# Patient Record
Sex: FEMALE | Race: WHITE | NOT HISPANIC OR LATINO | Employment: UNEMPLOYED | URBAN - METROPOLITAN AREA
[De-identification: names, ages, dates, MRNs, and addresses within clinical notes are randomized per-mention and may not be internally consistent; named-entity substitution may affect disease eponyms.]

---

## 2017-02-27 RX ORDER — CLOBETASOL PROPIONATE 0.5 MG/G
CREAM TOPICAL
Qty: 60 G | Refills: 0 | OUTPATIENT
Start: 2017-02-27

## 2017-03-18 DIAGNOSIS — M35.9 AUTOIMMUNE DISEASE (H): Primary | ICD-10-CM

## 2017-03-18 DIAGNOSIS — L63.9 ALOPECIA AREATA: ICD-10-CM

## 2017-03-18 RX ORDER — CLOBETASOL PROPIONATE 0.5 MG/G
CREAM TOPICAL
Qty: 60 G | Refills: 1 | Status: SHIPPED | OUTPATIENT
Start: 2017-03-18 | End: 2019-02-19

## 2017-03-18 RX ORDER — CLOBETASOL PROPIONATE 0.5 MG/G
OINTMENT TOPICAL
Qty: 60 G | Refills: 1 | Status: SHIPPED | OUTPATIENT
Start: 2017-03-18 | End: 2022-01-31

## 2017-04-25 ENCOUNTER — OFFICE VISIT (OUTPATIENT)
Dept: DERMATOLOGY | Facility: CLINIC | Age: 62
End: 2017-04-25

## 2017-04-25 VITALS — DIASTOLIC BLOOD PRESSURE: 73 MMHG | SYSTOLIC BLOOD PRESSURE: 116 MMHG | HEART RATE: 78 BPM

## 2017-04-25 DIAGNOSIS — L63.9 ALOPECIA AREATA: ICD-10-CM

## 2017-04-25 DIAGNOSIS — L21.9 DERMATITIS, SEBORRHEIC: ICD-10-CM

## 2017-04-25 DIAGNOSIS — M35.9 AUTOIMMUNE DISEASE (H): ICD-10-CM

## 2017-04-25 DIAGNOSIS — L63.9 ALOPECIA AREATA: Primary | ICD-10-CM

## 2017-04-25 LAB
BASOPHILS # BLD AUTO: 0 10E9/L (ref 0–0.2)
BASOPHILS NFR BLD AUTO: 0.5 %
DEPRECATED CALCIDIOL+CALCIFEROL SERPL-MC: 64 UG/L (ref 20–75)
DIFFERENTIAL METHOD BLD: NORMAL
EOSINOPHIL # BLD AUTO: 0.3 10E9/L (ref 0–0.7)
EOSINOPHIL NFR BLD AUTO: 4.7 %
ERYTHROCYTE [DISTWIDTH] IN BLOOD BY AUTOMATED COUNT: 12.2 % (ref 10–15)
FERRITIN SERPL-MCNC: 102 NG/ML (ref 8–252)
HCT VFR BLD AUTO: 42 % (ref 35–47)
HGB BLD-MCNC: 14.4 G/DL (ref 11.7–15.7)
IMM GRANULOCYTES # BLD: 0 10E9/L (ref 0–0.4)
IMM GRANULOCYTES NFR BLD: 0.3 %
IRON SATN MFR SERPL: 29 % (ref 15–46)
IRON SERPL-MCNC: 101 UG/DL (ref 35–180)
LYMPHOCYTES # BLD AUTO: 1.8 10E9/L (ref 0.8–5.3)
LYMPHOCYTES NFR BLD AUTO: 29.5 %
MCH RBC QN AUTO: 31.8 PG (ref 26.5–33)
MCHC RBC AUTO-ENTMCNC: 34.3 G/DL (ref 31.5–36.5)
MCV RBC AUTO: 93 FL (ref 78–100)
MONOCYTES # BLD AUTO: 0.6 10E9/L (ref 0–1.3)
MONOCYTES NFR BLD AUTO: 9.3 %
NEUTROPHILS # BLD AUTO: 3.4 10E9/L (ref 1.6–8.3)
NEUTROPHILS NFR BLD AUTO: 55.7 %
NRBC # BLD AUTO: 0 10*3/UL
NRBC BLD AUTO-RTO: 0 /100
PLATELET # BLD AUTO: 240 10E9/L (ref 150–450)
RBC # BLD AUTO: 4.53 10E12/L (ref 3.8–5.2)
TIBC SERPL-MCNC: 346 UG/DL (ref 240–430)
TSH SERPL DL<=0.005 MIU/L-ACNC: 1.67 MU/L (ref 0.4–4)
WBC # BLD AUTO: 6.1 10E9/L (ref 4–11)

## 2017-04-25 RX ORDER — KETOCONAZOLE 20 MG/ML
SHAMPOO TOPICAL
Qty: 120 ML | Refills: 5 | Status: SHIPPED | OUTPATIENT
Start: 2017-04-25

## 2017-04-25 RX ORDER — ESCITALOPRAM OXALATE 20 MG/1
TABLET ORAL
COMMUNITY
Start: 2017-04-10 | End: 2020-09-15

## 2017-04-25 ASSESSMENT — PAIN SCALES - GENERAL: PAINLEVEL: NO PAIN (0)

## 2017-04-25 NOTE — NURSING NOTE
Dermatology Rooming Note    Maria C Goodman's goals for this visit include:   Chief Complaint   Patient presents with     Hair Loss     Maria C is here today for a hair loss follow up.      Susan Newsome MA

## 2017-04-25 NOTE — LETTER
"4/25/2017       RE: Maria C Goodman  [address] [place] [address]     Dear Colleague,    Thank you for referring your patient, Maria C Goodman, to the [facility] DERMATOLOGY at [facility]. Please see a copy of my visit note below.    [facility] Dermatology Note    Encounter Date: April 25, 2017    Dermatology Problem List   - Alopecia areata   - Hand dermatitis   - Facial hypertrichosis, resolved  - urticaria, possibly cold induced      Chief Complaint  Chief Complaint   Patient presents with     Hair Loss     Maria C is here today for a hair loss follow up.        History of Present Illness  Maria C Goodman is a pleasant 61 year old female who presents as a follow-up for alopecia areata.     Cold/chronic urticaria has improved with omalizumab injections.     As for her alopecia, she did notice new patches six weeks ago, correlated with the end of a long-term respiratory infection. She self-treats with Lidex solution applied topically \"8 times per week.\" Lidex is not apparently helpful; at least one patch is expanding in surface area involvement. Symptoms associated with hair loss include pruritus but never a burning sensation.    Ms. Goodman has no leg hair or underarm hair, which is stable over time. This has been stable \"for years.\"     Maria C takes an iron supplement daily. Her regular shampoo is Head and Shoulders.    The patient has been free of stage 3A lobular breast carcinoma for several months now.    Medications  Current Outpatient Prescriptions   Medication Sig Dispense Refill     omalizumab (XOLAIR) 150 MG injection Inject 300 mg Subcutaneous       escitalopram (LEXAPRO) 20 MG tablet Take one tablet by mouth once daily with 10 mg tablet for total dose 30 mg.       clobetasol (TEMOVATE) 0.05 % ointment Apply a fine layer to the scalp at night after application of Rogaine, shampoo in am. A tube should last 2 months. 60 g 1     clobetasol (TEMOVATE) " 0.05 % cream Apply a fine layer to scalp in the morning after shampooing, a tube should last 2 months 60 g 1     fluocinonide (LIDEX) 0.05 % external solution Use to treat any areas on the scalp that are itchy as needed. Use twice daily to any areas of complete hair loss (including 2 inches around this area) 60 mL 3     levothyroxine (SYNTHROID, LEVOTHROID) 112 MCG tablet Take by mouth daily       tacrolimus (PROTOPIC) 0.1 % ointment Apply topically 2 times daily 60 g 1     ferrous gluconate (FERGON) 324 (38 FE) MG tablet One tablet daily 60 tablet 1     EXEMESTANE PO Take by mouth daily       montelukast (SINGULAIR) 10 MG tablet Take 10 mg by mouth At Bedtime       cetirizine (ZYRTEC) 10 MG tablet Take 10 mg by mouth daily       RANITIDINE HCL PO Take 150 mg by mouth 2 times daily       Cholecalciferol (VITAMIN D) 1000 UNITS capsule Take 5,000 Units by mouth daily       Calcium Carbonate-Vitamin D (CALCIUM + D PO) Take by mouth daily       ALBUTEROL IN Inhale into the lungs daily as needed         Allergies  Allergies   Allergen Reactions     Acetaminophen Hives     Penicillins Hives     Shellfish Allergy GI Disturbance     Silver Nitrate Hives     tegaderm adhesive dressing      Sulfa Drugs Hives     Vicodin [Hydrocodone-Acetaminophen] Hives       Review of Systems   - Const: the patient is generally feeling well.   - Skin: as per HPI  -LYMPH: lymphedema to the left arm related to ipsilateral lymph node biopsy. Treating with compression sleeve  -PULM: Reports respiratory symptoms over the summer; treated with antibiotics. Antibiotics ended mid-March     Past Medical History:   Past Medical History:   Diagnosis Date     Asthma      Hypothyroidism      Lobular breast cancer (H)     s/p chemotherapy and radiation, in remission     Multiple allergies      History reviewed. No pertinent surgical history.    Family History  - niece with alopecia areata  Family History   Problem Relation Age of Onset     CANCER No family  hx of      skin cancer     Skin Cancer No family hx of      no skin cancer     Rheumatoid Arthritis Mother        Physical exam  /73  Pulse 78    General: This is a well developed, well-nourished female in no acute distress, in a pleasant mood. Kim skin type I-II.  Lungs: breathing comfortably on room air  Skin: Focused examination of the scalp, eyelashes, fingernails was performed. Significant findings include:  -Patchy focal erythema under examination of the scalp under bright light  -Hair pull tests are negative.  -Bilateral arms: sparse hairs.   -Vertical ridging of 10/10 fingernails  -New activity of hair loss throughout frontal scalp.  -Left side:  patch of alopecic hair los.  -Left and lower occiput: new patches of alopecia.   - Hair pull tests are negative      Impression/Plan  1-2. Alopecia areata confounded by seborrheic dermatitis. Patient advised to use Lidex solution on any new areas of alopecia areata that may develop and treat 2 inches in all directions in the surrounding scalp. At this time she may discontinue Rogaine. Patient instruction today focused on maintaining scalp health.     Draw for hairloss labs: CBC with diff, ferritin, TSH, vitamin D    Start ketoconazole 2% shampoo. Apply to scalp. Lather, then rinse. Use every other day, alternating with Head and Shoulders shampoo.      2. Urticaria, chronic/cold-induced, well-controlled with omalizumab:    Continue Xolair injections    Follow-up in eight weeks    Staff involved:  Scribe/Staff  I, Ramses Cobian, am serving as a scribe to document services personally performed by Dr. Nita Brown MD, based on data collection and the provider's statements to me.    Ms. Goodman was seen with Ramses as noted above. Assessents and plan were developed by me.    Nita Brown MD  Professor and  Chair  Department of Dermatology  [facility]              Pictures were placed in Pt's chart today for future reference.      Again,  thank you for allowing me to participate in the care of your patient.      Sincerely,    Nita Brown MD

## 2017-04-25 NOTE — MR AVS SNAPSHOT
After Visit Summary   4/25/2017    Maria C Goodman    MRN: [id number]           Patient Information     Date Of Birth          1955        Visit Information        Provider Department      4/25/2017 2:00 PM Nita Brown MD [facility] Dermatology        Today's Diagnoses     Alopecia areata    -  1    Autoimmune disease (H)        Dermatitis, seborrheic          Care Instructions    Dr. Brown's Clinic Follow-up:    If we are unable to schedule your appointment today, then you will receive a personal call from our clinic staff to schedule with Dr. Brown prior to your expected return.    Please, contact us with any questions via telephone.  We are not using [employer] to schedule appointments for this clinic at this the present time    How do I call with questions?       [facility]: [phone]       For urgent needs outside of business hours call the [facility] at [phone]        and ask for the dermatology resident on call            Follow-ups after your visit        Future tests that were ordered for you today     Open Future Orders        Priority Expected Expires Ordered    CBC with platelets differential Routine  4/25/2018 4/25/2017    Ferritin Routine  4/25/2018 4/25/2017    Iron and iron binding capacity Routine  4/25/2018 4/25/2017    TSH with free T4 reflex Routine  4/25/2018 4/25/2017    Vitamin D Deficiency Routine  4/25/2018 4/25/2017            Who to contact     Please call your clinic at [phone] to:    Ask questions about your health    Make or cancel appointments    Discuss your medicines    Learn about your test results    Speak to your doctor   If you have compliments or concerns about an experience at your clinic, or if you wish to file a complaint, please contact [facility] Patient Relations at [phone] or email us at [email or web address]         Additional Information About  Your Visit        "[employer]hart Information     [employer] gives you secure access to your electronic health record. If you see a primary care provider, you can also send messages to your care team and make appointments. If you have questions, please call your primary care clinic.  If you do not have a primary care provider, please call [phone] and they will assist you.      [employer] is an electronic gateway that provides easy, online access to your medical records. With [employer], you can request a clinic appointment, read your test results, renew a prescription or communicate with your care team.     To access your existing account, please contact your [facility] or call [phone] for assistance.        Care EveryWhere ID     This is your Care EveryWhere ID. This could be used by other organizations to access your [place] medical records  [id number]        Your Vitals Were     Pulse                   78            Blood Pressure from Last 3 Encounters:   04/25/17 116/73   04/04/16 118/75   12/14/15 112/59    Weight from Last 3 Encounters:   04/04/16 73.9 kg (163 lb)   12/14/15 73.9 kg (163 lb)   10/01/15 72.6 kg (160 lb)                 Today's Medication Changes          These changes are accurate as of: 4/25/17  2:57 PM.  If you have any questions, ask your nurse or doctor.               Start taking these medicines.        Dose/Directions    ketoconazole 2 % shampoo   Commonly known as:  NIZORAL   Used for:  Dermatitis, seborrheic   Started by:  Nita Brown MD        Apply to scalp, lather, then rinse, do every other day alternating with Head & Shoulders   Quantity:  120 mL   Refills:  5            Where to get your medicines      These medications were sent to [id number] - [place], [place] - [address] AT [facility] [address]  [address], [place] [address]     Phone:  [phone]     ketoconazole 2 % shampoo                Primary Care  Provider Office Phone # Fax #    Damaris Mckeon [phone]       [facility] [address] [place] [address]        Thank you!     Thank you for choosing [facility] DERMATOLOGY  for your care. Our goal is always to provide you with excellent care. Hearing back from our patients is one way we can continue to improve our services. Please take a few minutes to complete the written survey that you may receive in the mail after your visit with us. Thank you!             Your Updated Medication List - Protect others around you: Learn how to safely use, store and throw away your medicines at www.disposemymeds.org.          This list is accurate as of: 4/25/17  2:57 PM.  Always use your most recent med list.                   Brand Name Dispense Instructions for use    ALBUTEROL IN      Inhale into the lungs daily as needed       CALCIUM + D PO      Take by mouth daily       cetirizine 10 MG tablet    zyrTEC     Take 10 mg by mouth daily       * clobetasol 0.05 % ointment    TEMOVATE    60 g    Apply a fine layer to the scalp at night after application of Rogaine, shampoo in am. A tube should last 2 months.       * clobetasol 0.05 % cream    TEMOVATE    60 g    Apply a fine layer to scalp in the morning after shampooing, a tube should last 2 months       escitalopram 20 MG tablet    LEXAPRO     Take one tablet by mouth once daily with 10 mg tablet for total dose 30 mg.       EXEMESTANE PO      Take by mouth daily       ferrous gluconate 324 (38 FE) MG tablet    FERGON    60 tablet    One tablet daily       fluocinonide 0.05 % solution    LIDEX    60 mL    Use to treat any areas on the scalp that are itchy as needed. Use twice daily to any areas of complete hair loss (including 2 inches around this area)       ketoconazole 2 % shampoo    NIZORAL    120 mL    Apply to scalp, lather, then rinse, do every other day alternating with Head & Shoulders       levothyroxine 112 MCG tablet     SYNTHROID/LEVOTHROID     Take by mouth daily       omalizumab 150 MG injection    XOLAIR     Inject 300 mg Subcutaneous       RANITIDINE HCL PO      Take 150 mg by mouth 2 times daily       SINGULAIR 10 MG tablet   Generic drug:  montelukast      Take 10 mg by mouth At Bedtime       tacrolimus 0.1 % ointment    PROTOPIC    60 g    Apply topically 2 times daily       vitamin D 1000 UNITS capsule      Take 5,000 Units by mouth daily       * Notice:  This list has 2 medication(s) that are the same as other medications prescribed for you. Read the directions carefully, and ask your doctor or other care provider to review them with you.

## 2017-04-25 NOTE — PATIENT INSTRUCTIONS
Dr. Brown's Clinic Follow-up:    If we are unable to schedule your appointment today, then you will receive a personal call from our clinic staff to schedule with Dr. Brown prior to your expected return.    Please, contact us with any questions via telephone.  We are not using [employer] to schedule appointments for this clinic at this the present time    How do I call with questions?       [facility]: [phone]       For urgent needs outside of business hours call the [facility] at [phone]        and ask for the dermatology resident on call

## 2017-04-25 NOTE — PROGRESS NOTES
"[facility] Dermatology Note    Encounter Date: April 25, 2017    Dermatology Problem List   - Alopecia areata   - Hand dermatitis   - Facial hypertrichosis, resolved  - urticaria, possibly cold induced      Chief Complaint  Chief Complaint   Patient presents with     Hair Loss     Maria C is here today for a hair loss follow up.        History of Present Illness  Maria C Goodman is a pleasant 61 year old female who presents as a follow-up for alopecia areata.     Cold/chronic urticaria has improved with omalizumab injections.     As for her alopecia, she did notice new patches six weeks ago, correlated with the end of a long-term respiratory infection. She self-treats with Lidex solution applied topically \"8 times per week.\" Lidex is not apparently helpful; at least one patch is expanding in surface area involvement. Symptoms associated with hair loss include pruritus but never a burning sensation.    Ms. Goodman has no leg hair or underarm hair, which is stable over time. This has been stable \"for years.\"     Maria C takes an iron supplement daily. Her regular shampoo is Head and Shoulders.    The patient has been free of stage 3A lobular breast carcinoma for several months now.    Medications  Current Outpatient Prescriptions   Medication Sig Dispense Refill     omalizumab (XOLAIR) 150 MG injection Inject 300 mg Subcutaneous       escitalopram (LEXAPRO) 20 MG tablet Take one tablet by mouth once daily with 10 mg tablet for total dose 30 mg.       clobetasol (TEMOVATE) 0.05 % ointment Apply a fine layer to the scalp at night after application of Rogaine, shampoo in am. A tube should last 2 months. 60 g 1     clobetasol (TEMOVATE) 0.05 % cream Apply a fine layer to scalp in the morning after shampooing, a tube should last 2 months 60 g 1     fluocinonide (LIDEX) 0.05 % external solution Use to treat any areas on the scalp that are itchy as needed. Use twice daily to any areas of complete hair loss " (including 2 inches around this area) 60 mL 3     levothyroxine (SYNTHROID, LEVOTHROID) 112 MCG tablet Take by mouth daily       tacrolimus (PROTOPIC) 0.1 % ointment Apply topically 2 times daily 60 g 1     ferrous gluconate (FERGON) 324 (38 FE) MG tablet One tablet daily 60 tablet 1     EXEMESTANE PO Take by mouth daily       montelukast (SINGULAIR) 10 MG tablet Take 10 mg by mouth At Bedtime       cetirizine (ZYRTEC) 10 MG tablet Take 10 mg by mouth daily       RANITIDINE HCL PO Take 150 mg by mouth 2 times daily       Cholecalciferol (VITAMIN D) 1000 UNITS capsule Take 5,000 Units by mouth daily       Calcium Carbonate-Vitamin D (CALCIUM + D PO) Take by mouth daily       ALBUTEROL IN Inhale into the lungs daily as needed         Allergies  Allergies   Allergen Reactions     Acetaminophen Hives     Penicillins Hives     Shellfish Allergy GI Disturbance     Silver Nitrate Hives     tegaderm adhesive dressing      Sulfa Drugs Hives     Vicodin [Hydrocodone-Acetaminophen] Hives       Review of Systems   - Const: the patient is generally feeling well.   - Skin: as per HPI  -LYMPH: lymphedema to the left arm related to ipsilateral lymph node biopsy. Treating with compression sleeve  -PULM: Reports respiratory symptoms over the summer; treated with antibiotics. Antibiotics ended mid-March     Past Medical History:   Past Medical History:   Diagnosis Date     Asthma      Hypothyroidism      Lobular breast cancer (H)     s/p chemotherapy and radiation, in remission     Multiple allergies      History reviewed. No pertinent surgical history.    Family History  - niece with alopecia areata  Family History   Problem Relation Age of Onset     CANCER No family hx of      skin cancer     Skin Cancer No family hx of      no skin cancer     Rheumatoid Arthritis Mother        Physical exam  /73  Pulse 78    General: This is a well developed, well-nourished female in no acute distress, in a pleasant mood. Kim skin  type I-II.  Lungs: breathing comfortably on room air  Skin: Focused examination of the scalp, eyelashes, fingernails was performed. Significant findings include:  -Patchy focal erythema under examination of the scalp under bright light  -Hair pull tests are negative.  -Bilateral arms: sparse hairs.   -Vertical ridging of 10/10 fingernails  -New activity of hair loss throughout frontal scalp.  -Left side:  patch of alopecic hair los.  -Left and lower occiput: new patches of alopecia.   - Hair pull tests are negative      Impression/Plan  1-2. Alopecia areata confounded by seborrheic dermatitis. Patient advised to use Lidex solution on any new areas of alopecia areata that may develop and treat 2 inches in all directions in the surrounding scalp. At this time she may discontinue Rogaine. Patient instruction today focused on maintaining scalp health.     Draw for hairloss labs: CBC with diff, ferritin, TSH, vitamin D    Start ketoconazole 2% shampoo. Apply to scalp. Lather, then rinse. Use every other day, alternating with Head and Shoulders shampoo.      2. Urticaria, chronic/cold-induced, well-controlled with omalizumab:    Continue Xolair injections    Follow-up in eight weeks    Staff involved:  Scribe/Staff  I, Ramses Cobian, am serving as a scribe to document services personally performed by Dr. Nita Brown MD, based on data collection and the provider's statements to me.    Ms. Goodman was seen with Ramses as noted above. Assessents and plan were developed by me.    Nita Brown MD  Professor and  Chair  Department of Dermatology  [facility]

## 2017-05-18 DIAGNOSIS — L63.9 AA (ALOPECIA AREATA): ICD-10-CM

## 2017-05-18 RX ORDER — FLUOCINONIDE TOPICAL SOLUTION USP, 0.05% 0.5 MG/ML
SOLUTION TOPICAL
Qty: 60 ML | Refills: 3 | Status: SHIPPED | OUTPATIENT
Start: 2017-05-18 | End: 2018-10-17

## 2017-05-18 NOTE — TELEPHONE ENCOUNTER
Last seen:4/25/17  Next appt:None    1-2. Alopecia areata confounded by seborrheic dermatitis. Patient advised to use Lidex solution on any new areas of alopecia areata that may develop and treat 2 inches in all directions in the surrounding hair. At this time she may discontinue Rogaine. Patient instruction today focused on maintaining scalp health.     Ms. Goodman was advised that her new alopecic activity is almost certainly not related to her new Xolair injections; other patients receiving these injections tolerate them without exacerbation of their alopecia areata.     Draw for hairloss labs: CBC with diff, ferritin, TSH, vitamin D    Start ketoconazole 2% shampoo. Apply to scalp. Lather, then rinse. Use every other day, alternating with Head and Shoulders shampoo.    Continue      2. Urticaria, chronic/cold-induced, well-controlled with omalizumab:    Continue Xolair injections     Follow-up in eight weeks

## 2017-09-01 ENCOUNTER — OFFICE VISIT (OUTPATIENT)
Dept: DERMATOLOGY | Facility: CLINIC | Age: 62
End: 2017-09-01

## 2017-09-01 VITALS — DIASTOLIC BLOOD PRESSURE: 67 MMHG | SYSTOLIC BLOOD PRESSURE: 116 MMHG | HEART RATE: 83 BPM

## 2017-09-01 DIAGNOSIS — L30.9 DERMATITIS: ICD-10-CM

## 2017-09-01 DIAGNOSIS — L50.9 URTICARIA: ICD-10-CM

## 2017-09-01 DIAGNOSIS — L63.9 ALOPECIA AREATA: Primary | ICD-10-CM

## 2017-09-01 DIAGNOSIS — M35.9 AUTOIMMUNE DISEASE (H): ICD-10-CM

## 2017-09-01 RX ORDER — CLOBETASOL PROPIONATE 0.05 G/100ML
SHAMPOO TOPICAL
Qty: 1 BOTTLE | Refills: 2 | Status: SHIPPED | OUTPATIENT
Start: 2017-09-01 | End: 2018-05-22

## 2017-09-01 NOTE — NURSING NOTE
Chief Complaint   Patient presents with     Hair Loss     Maria C is here today for a recheck on her hair loss. She states that things are not going well.      Юлия Reddy, CMA

## 2017-09-01 NOTE — PATIENT INSTRUCTIONS
Stop iron supplement and resume Vitamin D 1000 IU daily    Hold on any topical steroids to scalp for the next 2 weeks and then start Clobex shampoo every other day if insurance covers and if not, then use fluocinonide solution to entire scalp every other day, aiming to use 1 bottle every 6 weeks.    RTC November as scheduled.    Nita Brown MD    .

## 2017-09-01 NOTE — LETTER
"9/1/2017       RE: Maria C Goodman  [address] [place] [address]     Dear Colleague,    Thank you for referring your patient, Maria C Goodman, to the [facility] DERMATOLOGY at [facility]. Please see a copy of my visit note below.    [facility] Dermatology Note      Dermatology Problem List:  1.Alopecia areata  2. Hand dermatitis  3. Urticaria, cold aggravated/induced    CC:   Chief Complaint   Patient presents with     Hair Loss     Maria C is here today for a recheck on her hair loss. She states that things are not going well.          Encounter Date: Sep 1, 2017    History of Present Illness:  Ms. Maria C Goodman is a 62 year old female who presents in follow up primarily for her alopecia areata. She reports that her scalp started to \"act up\"in early May with mild to moderate itching, often at night. She has been relying on fluocinonide solution applied 3 times per week this summer  to keep the symptom under control. Maria C reports shampooing every other day alternating H&S with 2% ketoconazole. She also uses clobetasol ointment 2 to 3 times per week.  She continues to take a daily iron supplement.    For her urticaria, she notes her allergist prescribed Xolair about a year ago, shots are being given monthly and this has really helped keep her lesions/symptoms under control.     Ms. Goodman continues to report good health and being  free of cancer - she was previously diagnosed with stage 3A lobular breast carcinoma, now treated.      Past Medical History:   Patient Active Problem List   Diagnosis     Alopecia areata     Dermatitis     Hypertrichosis     Urticaria     Past Medical History:   Diagnosis Date     Asthma      Hypothyroidism      Lobular breast cancer (H)     s/p chemotherapy and radiation, in remission     Multiple allergies          Medications:  Current Outpatient Prescriptions   Medication Sig Dispense Refill     fluocinonide (LIDEX) 0.05 % " solution Use to treat any areas on the scalp that are itchy as needed. Use twice daily to any areas of complete hair loss. 60 mL 3     omalizumab (XOLAIR) 150 MG injection Inject 300 mg Subcutaneous       escitalopram (LEXAPRO) 20 MG tablet Take one tablet by mouth once daily with 10 mg tablet for total dose 30 mg.       ketoconazole (NIZORAL) 2 % shampoo Apply to scalp, lather, then rinse, do every other day alternating with Head & Shoulders 120 mL 5     clobetasol (TEMOVATE) 0.05 % ointment Apply a fine layer to the scalp at night after application of Rogaine, shampoo in am. A tube should last 2 months. 60 g 1     clobetasol (TEMOVATE) 0.05 % cream Apply a fine layer to scalp in the morning after shampooing, a tube should last 2 months 60 g 1     levothyroxine (SYNTHROID, LEVOTHROID) 112 MCG tablet Take by mouth daily       tacrolimus (PROTOPIC) 0.1 % ointment Apply topically 2 times daily 60 g 1     ferrous gluconate (FERGON) 324 (38 FE) MG tablet One tablet daily 60 tablet 1     EXEMESTANE PO Take by mouth daily       montelukast (SINGULAIR) 10 MG tablet Take 10 mg by mouth At Bedtime       cetirizine (ZYRTEC) 10 MG tablet Take 10 mg by mouth daily       RANITIDINE HCL PO Take 150 mg by mouth 2 times daily       Cholecalciferol (VITAMIN D) 1000 UNITS capsule Take 5,000 Units by mouth daily       Calcium Carbonate-Vitamin D (CALCIUM + D PO) Take by mouth daily       ALBUTEROL IN Inhale into the lungs daily as needed       Allergies   Allergen Reactions     Acetaminophen Hives     Penicillins Hives     Shellfish Allergy GI Disturbance     Silver Nitrate Hives     tegaderm adhesive dressing      Sulfa Drugs Hives     Vicodin [Hydrocodone-Acetaminophen] Hives     Family History  - niece with alopecia areata      Review of Systems:  -The patient denies any new rash, pruritus, or lesions that are symptomatic, changing or bleeding, except as per HPI.  -Constitutional: The patient denies fatigue, fevers, chills,  unintended weight loss, and night sweats.  -HEENT: Patient denies nonhealing oral sores.  -Skin: As above in HPI. No additional skin concerns.    Physical exam:  Vitals: /67 (BP Location: Right arm, Patient Position: Sitting, Cuff Size: Adult Regular)  Pulse 83  GEN: This is a well developed, well-nourished female in no acute distress, in a pleasant mood.  She presents to clinic with her .  SKIN: The exam included the head/face, neck, both arms, digits and/or nails.   - Compared to photos, there is improvement in hair regrowth but also new patches are noted  - Hair pull tests are overall negative  - Nails are improved since last visit with less ridging  -Eyebrows/eyelashes are overall normal appearing  -Hair fiber growth noted on arms  - Patchy focal scale and erythema on scalp      Impression/Plan:     1. Alopecia areata and  seborrheic dermatitis.   --Recommend  ILK today  Kenalog intralesional injection procedure note: After verbal consent and discussion of risks including but not limited to atrophy, pain, and bruising, 3 total cc of Kenalog 10 mg/cc was injected into 30 sites on the scalp.  The patient tolerated the procedure well and left the Dermatology clinic in good condition.    -Shampoo daily if possible alternating Clobex and 2% ketoconazole shampoos  - If using clobetasol or Lidex to new areas and Rogaine, instructed to apply Rogaine first  - No topical steroids for at least 2 weeks following ILK 10 injections  - Stop iron supplement and resume Vitamin D 1000 IU daily       2. Urticaria, chronic/cold-induced, well-controlled with omalizumab:    Continue Xolair injections       Follow-up 2 months.    Nita Brown MD

## 2017-09-01 NOTE — PROGRESS NOTES
"[facility] Dermatology Note      Dermatology Problem List:  1.Alopecia areata  2. Hand dermatitis  3. Urticaria, cold aggravated/induced    CC:   Chief Complaint   Patient presents with     Hair Loss     Maria C is here today for a recheck on her hair loss. She states that things are not going well.          Encounter Date: Sep 1, 2017    History of Present Illness:  Ms. Maria C Goodman is a 62 year old female who presents in follow up primarily for her alopecia areata. She reports that her scalp started to \"act up\"in early May with mild to moderate itching, often at night. She has been relying on fluocinonide solution applied 3 times per week this summer  to keep the symptom under control. Maria C reports shampooing every other day alternating H&S with 2% ketoconazole. She also uses clobetasol ointment 2 to 3 times per week.  She continues to take a daily iron supplement.    For her urticaria, she notes her allergist prescribed Xolair about a year ago, shots are being given monthly and this has really helped keep her lesions/symptoms under control.     Ms. Goodman continues to report good health and being  free of cancer - she was previously diagnosed with stage 3A lobular breast carcinoma, now treated.      Past Medical History:   Patient Active Problem List   Diagnosis     Alopecia areata     Dermatitis     Hypertrichosis     Urticaria     Past Medical History:   Diagnosis Date     Asthma      Hypothyroidism      Lobular breast cancer (H)     s/p chemotherapy and radiation, in remission     Multiple allergies          Medications:  Current Outpatient Prescriptions   Medication Sig Dispense Refill     fluocinonide (LIDEX) 0.05 % solution Use to treat any areas on the scalp that are itchy as needed. Use twice daily to any areas of complete hair loss. 60 mL 3     omalizumab (XOLAIR) 150 MG injection Inject 300 mg Subcutaneous       escitalopram (LEXAPRO) 20 MG tablet Take one tablet by mouth once daily with " 10 mg tablet for total dose 30 mg.       ketoconazole (NIZORAL) 2 % shampoo Apply to scalp, lather, then rinse, do every other day alternating with Head & Shoulders 120 mL 5     clobetasol (TEMOVATE) 0.05 % ointment Apply a fine layer to the scalp at night after application of Rogaine, shampoo in am. A tube should last 2 months. 60 g 1     clobetasol (TEMOVATE) 0.05 % cream Apply a fine layer to scalp in the morning after shampooing, a tube should last 2 months 60 g 1     levothyroxine (SYNTHROID, LEVOTHROID) 112 MCG tablet Take by mouth daily       tacrolimus (PROTOPIC) 0.1 % ointment Apply topically 2 times daily 60 g 1     ferrous gluconate (FERGON) 324 (38 FE) MG tablet One tablet daily 60 tablet 1     EXEMESTANE PO Take by mouth daily       montelukast (SINGULAIR) 10 MG tablet Take 10 mg by mouth At Bedtime       cetirizine (ZYRTEC) 10 MG tablet Take 10 mg by mouth daily       RANITIDINE HCL PO Take 150 mg by mouth 2 times daily       Cholecalciferol (VITAMIN D) 1000 UNITS capsule Take 5,000 Units by mouth daily       Calcium Carbonate-Vitamin D (CALCIUM + D PO) Take by mouth daily       ALBUTEROL IN Inhale into the lungs daily as needed       Allergies   Allergen Reactions     Acetaminophen Hives     Penicillins Hives     Shellfish Allergy GI Disturbance     Silver Nitrate Hives     tegaderm adhesive dressing      Sulfa Drugs Hives     Vicodin [Hydrocodone-Acetaminophen] Hives     Family History  - niece with alopecia areata      Review of Systems:  -The patient denies any new rash, pruritus, or lesions that are symptomatic, changing or bleeding, except as per HPI.  -Constitutional: The patient denies fatigue, fevers, chills, unintended weight loss, and night sweats.  -HEENT: Patient denies nonhealing oral sores.  -Skin: As above in HPI. No additional skin concerns.    Physical exam:  Vitals: /67 (BP Location: Right arm, Patient Position: Sitting, Cuff Size: Adult Regular)  Pulse 83  GEN: This is a well  developed, well-nourished female in no acute distress, in a pleasant mood.  She presents to clinic with her .  SKIN: The exam included the head/face, neck, both arms, digits and/or nails.   - Compared to photos, there is improvement in hair regrowth but also new patches are noted  - Hair pull tests are overall negative  - Nails are improved since last visit with less ridging  -Eyebrows/eyelashes are overall normal appearing  -Hair fiber growth noted on arms  - Patchy focal scale and erythema on scalp      Impression/Plan:     1. Alopecia areata and  seborrheic dermatitis.   --Recommend  ILK today  Kenalog intralesional injection procedure note: After verbal consent and discussion of risks including but not limited to atrophy, pain, and bruising, 3 total cc of Kenalog 10 mg/cc was injected into 30 sites on the scalp.  The patient tolerated the procedure well and left the Dermatology clinic in good condition.    -Shampoo daily if possible alternating Clobex and 2% ketoconazole shampoos  - If using clobetasol or Lidex to new areas and Rogaine, instructed to apply Rogaine first  - No topical steroids for at least 2 weeks following ILK 10 injections  - Stop iron supplement and resume Vitamin D 1000 IU daily       2. Urticaria, chronic/cold-induced, well-controlled with omalizumab:    Continue Xolair injections       Follow-up 2 months.    Nita Brown MD

## 2017-09-04 PROBLEM — M35.9 AUTOIMMUNE DISEASE (H): Status: ACTIVE | Noted: 2017-09-04

## 2017-09-10 ENCOUNTER — HEALTH MAINTENANCE LETTER (OUTPATIENT)
Age: 62
End: 2017-09-10

## 2017-11-13 ENCOUNTER — OFFICE VISIT (OUTPATIENT)
Dept: DERMATOLOGY | Facility: CLINIC | Age: 62
End: 2017-11-13

## 2017-11-13 VITALS — HEART RATE: 79 BPM | SYSTOLIC BLOOD PRESSURE: 117 MMHG | DIASTOLIC BLOOD PRESSURE: 70 MMHG

## 2017-11-13 DIAGNOSIS — L63.9 AA (ALOPECIA AREATA): Primary | ICD-10-CM

## 2017-11-13 DIAGNOSIS — L30.9 DERMATITIS: ICD-10-CM

## 2017-11-13 DIAGNOSIS — M35.9 AUTOIMMUNE DISEASE (H): ICD-10-CM

## 2017-11-13 RX ORDER — FLUOCINOLONE ACETONIDE 0.11 MG/ML
OIL TOPICAL WEEKLY
Qty: 1 BOTTLE | Refills: 3 | Status: SHIPPED | OUTPATIENT
Start: 2017-11-13 | End: 2018-10-17

## 2017-11-13 ASSESSMENT — PAIN SCALES - GENERAL
PAINLEVEL: NO PAIN (0)
PAINLEVEL: MILD PAIN (2)

## 2017-11-13 NOTE — LETTER
"11/13/2017       RE: Maria C Goodman  [address] [place] [address]     Dear Colleague,    Thank you for referring your patient, Maria C Goodman, to the [facility] DERMATOLOGY at [facility]. Please see a copy of my visit note below.    [facility] Dermatology Note      Dermatology Problem List:  1.Alopecia areata  2. Hand dermatitis  3. Urticaria, cold aggravated/induced      Encounter Date: Nov 13, 2017    CC:  Chief Complaint   Patient presents with     Hair Loss     Maria C comes to clinic today for Alopecia areata and  seborrheic dermatitis. States \"I'd say it's moderately better.\"         History of Present Illness:  Ms. Maria C Goodman is a 62 year old female who presents as a follow-up for alopecia areata with seborrheic dermatitis, chronic cold induced urticaria, and hand dermatitis. The patient was last seen 9/1 when 3cc ILK 10 were injected into the scalp. She is shampooing approximately every other day, uses clobetasol shampoo every third day and ketoconazole shampoo the rest of the time. She was instructed to shampoo daily, but decreased due to scalp dryness and itching with the colder weather. She uses clobetasol solution as needed for new spots. Has not been using lidex, and she forgot about the rogaine so has not been using either. She continues to take Vitamin D, and stopped taking iron as instructed. Overall she feels the hair loss has gotten better, there are some completely bare spots but most other areas improved after injections. The scalp feels dry particularly on the crown, and is quite itchy. No scalp pain, burning, or tingling. Eyebrows/eyelashes are stable, no hair loss elsewhere. She has grown some new hairs in the axilla, and will grow random leg hairs.     For her chronic cold/pressure induced urticaria, she has monthly Xolair injections. Well controlled with this.      Past Medical History:   Patient Active Problem List "   Diagnosis     Alopecia areata     Dermatitis     Hypertrichosis     Urticaria     Autoimmune disease (H)     Past Medical History:   Diagnosis Date     Asthma      Hypothyroidism      Lobular breast cancer (H)     s/p chemotherapy and radiation, in remission     Multiple allergies      History reviewed. No pertinent surgical history.        Medications:  Current Outpatient Prescriptions   Medication Sig Dispense Refill     clobetasol propionate (CLOBEX) 0.05 % SHAM Apply to a dry scalp, leave on for 10-15 minutes, then lather and rinse, do every other day 1 Bottle 2     fluocinonide (LIDEX) 0.05 % solution Use to treat any areas on the scalp that are itchy as needed. Use twice daily to any areas of complete hair loss. 60 mL 3     omalizumab (XOLAIR) 150 MG injection Inject 300 mg Subcutaneous       escitalopram (LEXAPRO) 20 MG tablet Take one tablet by mouth once daily with 10 mg tablet for total dose 30 mg.       ketoconazole (NIZORAL) 2 % shampoo Apply to scalp, lather, then rinse, do every other day alternating with Head & Shoulders 120 mL 5     clobetasol (TEMOVATE) 0.05 % ointment Apply a fine layer to the scalp at night after application of Rogaine, shampoo in am. A tube should last 2 months. 60 g 1     clobetasol (TEMOVATE) 0.05 % cream Apply a fine layer to scalp in the morning after shampooing, a tube should last 2 months 60 g 1     levothyroxine (SYNTHROID, LEVOTHROID) 112 MCG tablet Take by mouth daily       tacrolimus (PROTOPIC) 0.1 % ointment Apply topically 2 times daily 60 g 1     ferrous gluconate (FERGON) 324 (38 FE) MG tablet One tablet daily 60 tablet 1     EXEMESTANE PO Take by mouth daily       montelukast (SINGULAIR) 10 MG tablet Take 10 mg by mouth At Bedtime       cetirizine (ZYRTEC) 10 MG tablet Take 10 mg by mouth daily       RANITIDINE HCL PO Take 150 mg by mouth 2 times daily       Cholecalciferol (VITAMIN D) 1000 UNITS capsule Take 5,000 Units by mouth daily       Calcium  Carbonate-Vitamin D (CALCIUM + D PO) Take by mouth daily       ALBUTEROL IN Inhale into the lungs daily as needed       Allergies   Allergen Reactions     Acetaminophen Hives     Penicillins Hives     Shellfish Allergy GI Disturbance     Silver Nitrate Hives     tegaderm adhesive dressing      Sulfa Drugs Hives     Vicodin [Hydrocodone-Acetaminophen] Hives         Review of Systems:  -As per HPI  -Constitutional: The patient denies fatigue, fevers, chills, unintended weight loss, and night sweats.  -Skin: As above in HPI. No additional skin concerns.    Physical exam:  Vitals: /70 (BP Location: Right arm, Patient Position: Sitting, Cuff Size: Adult Regular)  Pulse 79  GEN: This is a well developed, well-nourished female in no acute distress, in a pleasant mood.    SKIN: Focused examination of the scalp, nails was performed.  -Patchy alopecia appears improved compared to photographs from 4/25 (none available from last visit)  -Regrowth noted in most patches, several patches are completely bare on occipital scalp and frontal scalp  -No scale or erythema of scalp noted   -Eyebrows, eyelashes WNL   -Fingernails with vertical ridging bilaterally   -Hands are diffusely xerotic, no erythema   -No other lesions of concern on areas examined.     Impression/Plan:  1. Alopecia areata with seborrheic dermatitis - Overall improved with good regrowth noted in older patches, however there are new patches as well     Kenalog intralesional injection procedure note : After verbal consent and discussion of risks including but not limited to atrophy, pain, and bruising,  time out was performed, 3 total cc of Kenalog 10 mg/cc was injected into 30 sites on the scalp.  The patient tolerated the procedure well and left the Dermatology clinic in good condition.    Continue clobetasol 0.05% shampoo alternating with ketoconazole 2% shampoo, every day    Start rogaine 5% foam nightly, apply under dermasmoothe oil if using that  day    Start dermasmoothe/FS 0.01% oil once a week - apply 1-2 capfuls to scalp, massage in and leave on overnight, wash out in the morning    No clobetasol, lidex or dermasmoothe oil for 2 weeks after injections today    Photographs taken for future reference    2. Hand dermatitis - Hands are very dry today, discussed good options for hand moisturizer including vaniply, vanicream, eucerin.     Start vaniply BID to hands, may apply overnight with gloves if tolerated    Vaniply samples given     3. Chronic pressure/cold induced urticaria - well controlled with monthly Xolair injections     Continue Xolair injections         Follow-up in 2 months, earlier for new or changing lesions.     Staff Involved:  Scribed by Pham Vallejo, MS4 for Dr. Brown.      I agree with the PFSH and ROS as completed by the Medical Student. The remainder of the encounter was performed by me and scribed by the Medical Student. The scribed note accurately reflects my personal services and the medical decisions made by me. ILK injections were done together.       Nita Brown MD  Professor and Chair  Department of Dermatology  [facility]                        Pictures taken of patient today to be placed in chart for future reference.      Again, thank you for allowing me to participate in the care of your patient.      Sincerely,    Nita Brown MD

## 2017-11-13 NOTE — NURSING NOTE
Drug Administration Record    Drug Name: triamcinolone acetonide(kenalog)  Dose: 3mL of triamcinolone 10mg/mL, 30mg dose  Route administered: ID  NDC #: Kenalog-10 ([address]-37)  Amount of waste(mL):2  Reason for waste: Single use vial

## 2017-11-13 NOTE — NURSING NOTE
"Dermatology Rooming Note    Maria C Mynorsue's goals for this visit include:   Chief Complaint   Patient presents with     Hair Loss     Maria C comes to clinic today for Alopecia areata and  seborrheic dermatitis. States \"I'd say it's moderately better.\"     Nilam Gallagher, [facility]    "

## 2017-11-13 NOTE — MR AVS SNAPSHOT
After Visit Summary   11/13/2017    Maria C Goodman    MRN: [id number]           Patient Information     Date Of Birth          1955        Visit Information        Provider Department      11/13/2017 8:00 AM Nita Brown MD [facility] Dermatology        Today's Diagnoses     AA (alopecia areata)    -  1      Care Instructions    For hands - vaniply is a great option, Eucerin and Vanicream are good too    Start Dermasmoothe oil on scalp once a week - apply 1-2 capfuls on scalp and massage in, leave on overnight and wash out in the morning  -You can also apply this to your cuticles    Start rogaine 5% foam every night, you can apply this underneath the dermasmoothe oil     No clobetasol or dermasmoothe for two weeks after the injections     Dr. Brown's Clinic Follow-up:    If we are unable to schedule your appointment today, then you will receive a personal call from our clinic staff to schedule with Dr. Brown prior to your expected return.    Please, contact us with any questions via telephone.  We are not using Tap 'n Tap to schedule appointments for this clinic at this the present time    How do I call with questions?       [facility]: [phone]       For urgent needs outside of business hours call the [facility] at [phone]        and ask for the dermatology resident on call            Follow-ups after your visit        Follow-up notes from your care team     Return in about 2 months (around 1/13/2018).      Who to contact     Please call your clinic at [phone] to:    Ask questions about your health    Make or cancel appointments    Discuss your medicines    Learn about your test results    Speak to your doctor   If you have compliments or concerns about an experience at your clinic, or if you wish to file a complaint, please contact [facility] Patient Relations at [phone] or email us at  [email or web address]         Additional Information About Your Visit        Exabrehart Information     [employer] gives you secure access to your electronic health record. If you see a primary care provider, you can also send messages to your care team and make appointments. If you have questions, please call your primary care clinic.  If you do not have a primary care provider, please call [phone] and they will assist you.      [employer] is an electronic gateway that provides easy, online access to your medical records. With [employer], you can request a clinic appointment, read your test results, renew a prescription or communicate with your care team.     To access your existing account, please contact your [facility] or call [phone] for assistance.        Care EveryWhere ID     This is your Care EveryWhere ID. This could be used by other organizations to access your [place] medical records  [id number]        Your Vitals Were     Pulse                   79            Blood Pressure from Last 3 Encounters:   11/13/17 117/70   09/01/17 116/67   04/25/17 116/73    Weight from Last 3 Encounters:   04/04/16 73.9 kg (163 lb)   12/14/15 73.9 kg (163 lb)   10/01/15 72.6 kg (160 lb)              Today, you had the following     No orders found for display       Primary Care Provider Office Phone # Fax #    Damaris Mckeon [phone]       [place] [address]        Equal Access to Services     SAUL LACY AH: Hadii gay ku hadasho Soomaali, waaxda luqadaha, qaybta kaalmada adeegyada, wax emir trent. So [facility] [phone].    ATENCIÓN: Si habla español, tiene a barboza disposición servicios gratuitos de asistencia lingüística. Llame al [phone].    We comply with applicable federal civil rights laws and [place] laws. We do not discriminate on the basis of race, color, national origin, age, disability,  sex, sexual orientation, or gender identity.            Thank you!     Thank you for choosing [facility] DERMATOLOGY  for your care. Our goal is always to provide you with excellent care. Hearing back from our patients is one way we can continue to improve our services. Please take a few minutes to complete the written survey that you may receive in the mail after your visit with us. Thank you!             Your Updated Medication List - Protect others around you: Learn how to safely use, store and throw away your medicines at www.disposemymeds.org.          This list is accurate as of: 11/13/17  9:05 AM.  Always use your most recent med list.                   Brand Name Dispense Instructions for use Diagnosis    ALBUTEROL IN      Inhale into the lungs daily as needed        CALCIUM + D PO      Take by mouth daily        cetirizine 10 MG tablet    zyrTEC     Take 10 mg by mouth daily        * clobetasol 0.05 % ointment    TEMOVATE    60 g    Apply a fine layer to the scalp at night after application of Rogaine, shampoo in am. A tube should last 2 months.    Autoimmune disease (H), Alopecia areata       * clobetasol 0.05 % cream    TEMOVATE    60 g    Apply a fine layer to scalp in the morning after shampooing, a tube should last 2 months    Autoimmune disease (H), Alopecia areata       * clobetasol propionate 0.05 % Sham    CLOBEX    1 Bottle    Apply to a dry scalp, leave on for 10-15 minutes, then lather and rinse, do every other day    Alopecia areata, Dermatitis       escitalopram 20 MG tablet    LEXAPRO     Take one tablet by mouth once daily with 10 mg tablet for total dose 30 mg.        EXEMESTANE PO      Take by mouth daily        ferrous gluconate 324 (38 FE) MG tablet    FERGON    60 tablet    One tablet daily    Low iron       fluocinonide 0.05 % solution    LIDEX    60 mL    Use to treat any areas on the scalp that are itchy as needed. Use twice daily to any areas of complete hair loss.    AA (alopecia  areata)       ketoconazole 2 % shampoo    NIZORAL    120 mL    Apply to scalp, lather, then rinse, do every other day alternating with Head & Shoulders    Dermatitis, seborrheic       levothyroxine 112 MCG tablet    SYNTHROID/LEVOTHROID     Take by mouth daily        omalizumab 150 MG injection    XOLAIR     Inject 300 mg Subcutaneous        RANITIDINE HCL PO      Take 150 mg by mouth 2 times daily        SINGULAIR 10 MG tablet   Generic drug:  montelukast      Take 10 mg by mouth At Bedtime        tacrolimus 0.1 % ointment    PROTOPIC    60 g    Apply topically 2 times daily    Dermatitis       vitamin D 1000 UNITS capsule      Take 5,000 Units by mouth daily        * Notice:  This list has 3 medication(s) that are the same as other medications prescribed for you. Read the directions carefully, and ask your doctor or other care provider to review them with you.

## 2017-11-13 NOTE — PATIENT INSTRUCTIONS
For hands - vaniply is a great option, Eucerin and Vanicream are good too    Start Dermasmoothe oil on scalp once a week - apply 1-2 capfuls on scalp and massage in, leave on overnight and wash out in the morning  -You can also apply this to your cuticles    Start rogaine 5% foam every night, you can apply this underneath the dermasmoothe oil     No clobetasol or dermasmoothe for two weeks after the injections     Dr. Brown's Clinic Follow-up:    If we are unable to schedule your appointment today, then you will receive a personal call from our clinic staff to schedule with Dr. Brown prior to your expected return.    Please, contact us with any questions via telephone.  We are not using [employer] to schedule appointments for this clinic at this the present time    How do I call with questions?       [facility]: [phone]       For urgent needs outside of business hours call the [facility] at [phone]        and ask for the dermatology resident on call

## 2017-11-13 NOTE — PROGRESS NOTES
"[facility] Dermatology Note      Dermatology Problem List:  1.Alopecia areata  2. Hand dermatitis  3. Urticaria, cold aggravated/induced      Encounter Date: Nov 13, 2017    CC:  Chief Complaint   Patient presents with     Hair Loss     Maria C comes to clinic today for Alopecia areata and  seborrheic dermatitis. States \"I'd say it's moderately better.\"         History of Present Illness:  Ms. Maria C Goodman is a 62 year old female who presents as a follow-up for alopecia areata with seborrheic dermatitis, chronic cold induced urticaria, and hand dermatitis. The patient was last seen 9/1 when 3cc ILK 10 were injected into the scalp. She is shampooing approximately every other day, uses clobetasol shampoo every third day and ketoconazole shampoo the rest of the time. She was instructed to shampoo daily, but decreased due to scalp dryness and itching with the colder weather. She uses clobetasol solution as needed for new spots. Has not been using lidex, and she forgot about the rogaine so has not been using either. She continues to take Vitamin D, and stopped taking iron as instructed. Overall she feels the hair loss has gotten better, there are some completely bare spots but most other areas improved after injections. The scalp feels dry particularly on the crown, and is quite itchy. No scalp pain, burning, or tingling. Eyebrows/eyelashes are stable, no hair loss elsewhere. She has grown some new hairs in the axilla, and will grow random leg hairs.     For her chronic cold/pressure induced urticaria, she has monthly Xolair injections. Well controlled with this.      Past Medical History:   Patient Active Problem List   Diagnosis     Alopecia areata     Dermatitis     Hypertrichosis     Urticaria     Autoimmune disease (H)     Past Medical History:   Diagnosis Date     Asthma      Hypothyroidism      Lobular breast cancer (H)     s/p chemotherapy and radiation, in remission     Multiple allergies  "     History reviewed. No pertinent surgical history.        Medications:  Current Outpatient Prescriptions   Medication Sig Dispense Refill     clobetasol propionate (CLOBEX) 0.05 % SHAM Apply to a dry scalp, leave on for 10-15 minutes, then lather and rinse, do every other day 1 Bottle 2     fluocinonide (LIDEX) 0.05 % solution Use to treat any areas on the scalp that are itchy as needed. Use twice daily to any areas of complete hair loss. 60 mL 3     omalizumab (XOLAIR) 150 MG injection Inject 300 mg Subcutaneous       escitalopram (LEXAPRO) 20 MG tablet Take one tablet by mouth once daily with 10 mg tablet for total dose 30 mg.       ketoconazole (NIZORAL) 2 % shampoo Apply to scalp, lather, then rinse, do every other day alternating with Head & Shoulders 120 mL 5     clobetasol (TEMOVATE) 0.05 % ointment Apply a fine layer to the scalp at night after application of Rogaine, shampoo in am. A tube should last 2 months. 60 g 1     clobetasol (TEMOVATE) 0.05 % cream Apply a fine layer to scalp in the morning after shampooing, a tube should last 2 months 60 g 1     levothyroxine (SYNTHROID, LEVOTHROID) 112 MCG tablet Take by mouth daily       tacrolimus (PROTOPIC) 0.1 % ointment Apply topically 2 times daily 60 g 1     ferrous gluconate (FERGON) 324 (38 FE) MG tablet One tablet daily 60 tablet 1     EXEMESTANE PO Take by mouth daily       montelukast (SINGULAIR) 10 MG tablet Take 10 mg by mouth At Bedtime       cetirizine (ZYRTEC) 10 MG tablet Take 10 mg by mouth daily       RANITIDINE HCL PO Take 150 mg by mouth 2 times daily       Cholecalciferol (VITAMIN D) 1000 UNITS capsule Take 5,000 Units by mouth daily       Calcium Carbonate-Vitamin D (CALCIUM + D PO) Take by mouth daily       ALBUTEROL IN Inhale into the lungs daily as needed       Allergies   Allergen Reactions     Acetaminophen Hives     Penicillins Hives     Shellfish Allergy GI Disturbance     Silver Nitrate Hives     tegaderm adhesive dressing       Sulfa Drugs Hives     Vicodin [Hydrocodone-Acetaminophen] Hives         Review of Systems:  -As per HPI  -Constitutional: The patient denies fatigue, fevers, chills, unintended weight loss, and night sweats.  -Skin: As above in HPI. No additional skin concerns.    Physical exam:  Vitals: /70 (BP Location: Right arm, Patient Position: Sitting, Cuff Size: Adult Regular)  Pulse 79  GEN: This is a well developed, well-nourished female in no acute distress, in a pleasant mood.    SKIN: Focused examination of the scalp, nails was performed.  -Patchy alopecia appears improved compared to photographs from 4/25 (none available from last visit)  -Regrowth noted in most patches, several patches are completely bare on occipital scalp and frontal scalp  -No scale or erythema of scalp noted   -Eyebrows, eyelashes WNL   -Fingernails with vertical ridging bilaterally   -Hands are diffusely xerotic, no erythema   -No other lesions of concern on areas examined.     Impression/Plan:  1. Alopecia areata with seborrheic dermatitis - Overall improved with good regrowth noted in older patches, however there are new patches as well     Kenalog intralesional injection procedure note : After verbal consent and discussion of risks including but not limited to atrophy, pain, and bruising,  time out was performed, 3 total cc of Kenalog 10 mg/cc was injected into 30 sites on the scalp.  The patient tolerated the procedure well and left the Dermatology clinic in good condition.    Continue clobetasol 0.05% shampoo alternating with ketoconazole 2% shampoo, every day    Start rogaine 5% foam nightly, apply under dermasmoothe oil if using that day    Start dermasmoothe/FS 0.01% oil once a week - apply 1-2 capfuls to scalp, massage in and leave on overnight, wash out in the morning    No clobetasol, lidex or dermasmoothe oil for 2 weeks after injections today    Photographs taken for future reference    2. Hand dermatitis - Hands are very dry  today, discussed good options for hand moisturizer including vaniply, vanicream, eucerin.     Start vaniply BID to hands, may apply overnight with gloves if tolerated    Vaniply samples given     3. Chronic pressure/cold induced urticaria - well controlled with monthly Xolair injections     Continue Xolair injections         Follow-up in 2 months, earlier for new or changing lesions.     Staff Involved:  Scribed by Pham Vallejo, MS4 for Dr. Brown.      I agree with the PFSH and ROS as completed by the Medical Student. The remainder of the encounter was performed by me and scribed by the Medical Student. The scribed note accurately reflects my personal services and the medical decisions made by me. ILK injections were done together.       Nita Brown MD  Professor and Chair  Department of Dermatology  [facility]

## 2017-12-01 LAB — NORMAL RANGE: NORMAL

## 2018-01-22 ENCOUNTER — OFFICE VISIT (OUTPATIENT)
Dept: DERMATOLOGY | Facility: CLINIC | Age: 63
End: 2018-01-22
Payer: COMMERCIAL

## 2018-01-22 VITALS
BODY MASS INDEX: 27.31 KG/M2 | SYSTOLIC BLOOD PRESSURE: 120 MMHG | DIASTOLIC BLOOD PRESSURE: 65 MMHG | HEART RATE: 77 BPM | HEIGHT: 64 IN | WEIGHT: 160 LBS

## 2018-01-22 DIAGNOSIS — L50.9 URTICARIA: ICD-10-CM

## 2018-01-22 DIAGNOSIS — M35.9 AUTOIMMUNE DISEASE (H): ICD-10-CM

## 2018-01-22 DIAGNOSIS — L30.9 DERMATITIS: ICD-10-CM

## 2018-01-22 DIAGNOSIS — L63.9 ALOPECIA AREATA: Primary | ICD-10-CM

## 2018-01-22 ASSESSMENT — PAIN SCALES - GENERAL
PAINLEVEL: MILD PAIN (2)
PAINLEVEL: NO PAIN (0)

## 2018-01-22 NOTE — PATIENT INSTRUCTIONS
Use the topical steroid (clobetasol) on new spots daily and extend application 1 inch around the affected area.  Continue to apply Rogaine to areas of thinning. Use up to 1 bottle per month.  Continue to use the oil 1x per week.  Okay to stop using the ketoconazole. If you try the ketoconazole and it makes your scalp feel less itchy or dry, then okay to use it!

## 2018-01-22 NOTE — NURSING NOTE
"Dermatology Rooming Note    Maria C Goodman's goals for this visit include:   Chief Complaint   Patient presents with     Hair Loss     Alopecia Areata. \"Continuing improvement\" since her last visit.     Amanda Echeverria, CMA  "

## 2018-01-22 NOTE — MR AVS SNAPSHOT
After Visit Summary   1/22/2018    Maria C Goodman    MRN: [id number]           Patient Information     Date Of Birth          1955        Visit Information        Provider Department      1/22/2018 7:30 AM Nita Brown MD [facility] Dermatology        Care Instructions    Use the topical steroid (clobetasol) on new spots daily and extend application 1 inch around the affected area.  Continue to apply Rogaine to areas of thinning. Use up to 1 bottle per month.  Continue to use the oil 1x per week.  Okay to stop using the ketoconazole. If you try the ketoconazole and it makes your scalp feel less itchy or dry, then okay to use it!          Follow-ups after your visit        Your next 10 appointments already scheduled     Mar 19, 2018  2:40 PM CDT   (Arrive by 2:25 PM)   RETURN HAIRLOSS with Nita Brown MD   [facility] Dermatology ([facility])    [address] [place] [id number] [phone]              Who to contact     Please call your clinic at [phone] to:    Ask questions about your health    Make or cancel appointments    Discuss your medicines    Learn about your test results    Speak to your doctor   If you have compliments or concerns about an experience at your clinic, or if you wish to file a complaint, please contact [facility] Patient Relations at [phone] or email us at [email or web address]         Additional Information About Your Visit        [employer] Information     [employer] gives you secure access to your electronic health record. If you see a primary care provider, you can also send messages to your care team and make appointments. If you have questions, please call your primary care clinic.  If you do not have a primary care provider, please call [phone] and they will assist you.      [employer] is an electronic gateway that provides easy, online access to  "your medical records. With [employer], you can request a clinic appointment, read your test results, renew a prescription or communicate with your care team.     To access your existing account, please contact your [facility] or call [phone] for assistance.        Care EveryWhere ID     This is your Care EveryWhere ID. This could be used by other organizations to access your [place] medical records  [id number]        Your Vitals Were     Pulse Height BMI (Body Mass Index)             77 1.626 m (5' 4\") 27.46 kg/m2          Blood Pressure from Last 3 Encounters:   01/22/18 120/65   11/13/17 117/70   09/01/17 116/67    Weight from Last 3 Encounters:   01/22/18 72.6 kg (160 lb)   04/04/16 73.9 kg (163 lb)   12/14/15 73.9 kg (163 lb)              We Performed the Following     Colonoscopy - HIM Scan        Primary Care Provider Office Phone # Fax #    Damaris Mckeon [phone]       [address] [address]        Equal Access to Services     [facility]: Hadii aad ku hadasho Soomaali, waaxda luqadaha, qaybta kaalmada adenorma, thierry carlson . So [facility] [phone].    ATENCIÓN: Si habla español, tiene a barboza disposición servicios gratuitos de asistencia lingüística. Lonnie al [phone].    We comply with applicable federal civil rights laws and [place] laws. We do not discriminate on the basis of race, color, national origin, age, disability, sex, sexual orientation, or gender identity.            Thank you!     Thank you for choosing [facility] DERMATOLOGY  for your care. Our goal is always to provide you with excellent care. Hearing back from our patients is one way we can continue to improve our services. Please take a few minutes to complete the written survey that you may receive in the mail after your visit with us. Thank you!             Your Updated Medication List - Protect others around you: Learn " how to safely use, store and throw away your medicines at www.disposemymeds.org.          This list is accurate as of: 1/22/18  9:03 AM.  Always use your most recent med list.                   Brand Name Dispense Instructions for use Diagnosis    ALBUTEROL IN      Inhale into the lungs daily as needed        CALCIUM + D PO      Take by mouth daily        cetirizine 10 MG tablet    zyrTEC     Take 10 mg by mouth daily        * clobetasol 0.05 % ointment    TEMOVATE    60 g    Apply a fine layer to the scalp at night after application of Rogaine, shampoo in am. A tube should last 2 months.    Autoimmune disease (H), Alopecia areata       * clobetasol 0.05 % cream    TEMOVATE    60 g    Apply a fine layer to scalp in the morning after shampooing, a tube should last 2 months    Autoimmune disease (H), Alopecia areata       * clobetasol propionate 0.05 % Sham    CLOBEX    1 Bottle    Apply to a dry scalp, leave on for 10-15 minutes, then lather and rinse, do every other day    Alopecia areata, Dermatitis       escitalopram 20 MG tablet    LEXAPRO     Take one tablet by mouth once daily with 10 mg tablet for total dose 30 mg.        EXEMESTANE PO      Take by mouth daily        ferrous gluconate 324 (38 FE) MG tablet    FERGON    60 tablet    One tablet daily    Low iron       Fluocinolone Acetonide Scalp 0.01 % Oil oil    DERMA-SMOOTHE/FS SCALP    1 Bottle    Apply topically once a week Use once a week. Apply 1-2 capfuls to dry scalp, massage in and leave on overnight, wash out in the morning    AA (alopecia areata)       fluocinonide 0.05 % solution    LIDEX    60 mL    Use to treat any areas on the scalp that are itchy as needed. Use twice daily to any areas of complete hair loss.    AA (alopecia areata)       ketoconazole 2 % shampoo    NIZORAL    120 mL    Apply to scalp, lather, then rinse, do every other day alternating with Head & Shoulders    Dermatitis, seborrheic       levothyroxine 112 MCG tablet     SYNTHROID/LEVOTHROID     Take by mouth daily        omalizumab 150 MG injection    XOLAIR     Inject 300 mg Subcutaneous        RANITIDINE HCL PO      Take 150 mg by mouth 2 times daily        SINGULAIR 10 MG tablet   Generic drug:  montelukast      Take 10 mg by mouth At Bedtime        tacrolimus 0.1 % ointment    PROTOPIC    60 g    Apply topically 2 times daily    Dermatitis       triamcinolone acetonide 10 MG/ML injection    KENALOG    5 mL    See med note    AA (alopecia areata)       vitamin D 1000 UNITS capsule      Take 5,000 Units by mouth daily        * Notice:  This list has 3 medication(s) that are the same as other medications prescribed for you. Read the directions carefully, and ask your doctor or other care provider to review them with you.

## 2018-01-22 NOTE — NURSING NOTE
Drug Administration Record    Drug Name: triamcinolone acetonide(kenalog)  Dose: 3mL of triamcinolone 10mg/mL, 30mg dose  Route administered: ID  NDC #: Kenalog-10 ([address]-45)  Amount of waste(mL):2  Reason for waste: Single use vial

## 2018-01-22 NOTE — LETTER
"1/22/2018       RE: Maria C Goodman  [address] [place] [address]     Dear Colleague,    Thank you for referring your patient, Maria C Goodman, to the [facility] DERMATOLOGY at [facility]. Please see a copy of my visit note below.    [facility] Dermatology Note      Dermatology Problem List:  1. Alopecia Areata, ILK 10,last injections November 13th, and again today  2. Hand Dermatitis, under good control, now only using Cetaphil  3. Urticaria, cold aggravated/induced, on Xolair    Encounter Date: Jan 22, 2018  Last Seen: Nov 13, 2017    CC:  Chief Complaint   Patient presents with     Hair Loss     Alopecia Areata. \"Continuing improvement\" since her last visit.       History of Present Illness:  Ms. Maria C Goodman is a 62 year old female who presents as a follow-up for alopecia areata. The patient was last seen on Nov 13, 2017 when 3 cc Kenalog 10 mg/cc was injected. She feels most of the injected areas have responded well and she has noted new hair growth. Since November, she has been applying dermasmoothe FS oil once per week and washing her hair with clobetasol 0.05% shampoo 2-3x per week. She also uses rogaine 5% foam 2-3x per week on the areas of alopecia and on the crown. She has not been using the ketoconazole shampoo. Sh has not been washing her hair daily. She is happy with the hair regrowth, but notes that her scalp is always mildly itchy. She has not noted flaking, scale, or erythema on her scalp. She denies scalp pain, burning, and tingling. Eyebrows/eyelashes are stable, and she notes no hair loss elsewhere.    She receives monthly Xolair injections for her chronic cold/pressure induced urticaria. She reports this is well-controlled.    She has a history of hand dermatitis, which she reports has now resolved. At her last visit she was instructed to use vaniply BID, but found this to be too thick. She has instead been using a Cetaphil cream " daily, which has significantly improved her dermatitis.    Past Medical History:   Patient Active Problem List   Diagnosis     Alopecia areata     Dermatitis     Hypertrichosis     Urticaria     Autoimmune disease (H)     Past Medical History:   Diagnosis Date     Asthma      Hypothyroidism      Lobular breast cancer (H)     s/p chemotherapy and radiation, in remission     Multiple allergies      No past surgical history on file.      Medications:  No recent changes in medications.  Current Outpatient Prescriptions   Medication Sig Dispense Refill     Fluocinolone Acetonide Scalp (DERMA-SMOOTHE/FS SCALP) 0.01 % OIL oil Apply topically once a week Use once a week. Apply 1-2 capfuls to dry scalp, massage in and leave on overnight, wash out in the morning 1 Bottle 3     clobetasol propionate (CLOBEX) 0.05 % SHAM Apply to a dry scalp, leave on for 10-15 minutes, then lather and rinse, do every other day 1 Bottle 2     fluocinonide (LIDEX) 0.05 % solution Use to treat any areas on the scalp that are itchy as needed. Use twice daily to any areas of complete hair loss. 60 mL 3     escitalopram (LEXAPRO) 20 MG tablet Take one tablet by mouth once daily with 10 mg tablet for total dose 30 mg.       ketoconazole (NIZORAL) 2 % shampoo Apply to scalp, lather, then rinse, do every other day alternating with Head & Shoulders 120 mL 5     clobetasol (TEMOVATE) 0.05 % ointment Apply a fine layer to the scalp at night after application of Rogaine, shampoo in am. A tube should last 2 months. 60 g 1     clobetasol (TEMOVATE) 0.05 % cream Apply a fine layer to scalp in the morning after shampooing, a tube should last 2 months 60 g 1     levothyroxine (SYNTHROID, LEVOTHROID) 112 MCG tablet Take by mouth daily       EXEMESTANE PO Take by mouth daily       montelukast (SINGULAIR) 10 MG tablet Take 10 mg by mouth At Bedtime       cetirizine (ZYRTEC) 10 MG tablet Take 10 mg by mouth daily       RANITIDINE HCL PO Take 150 mg by mouth 2 times  "daily       Cholecalciferol (VITAMIN D) 1000 UNITS capsule Take 5,000 Units by mouth daily       Calcium Carbonate-Vitamin D (CALCIUM + D PO) Take by mouth daily       ALBUTEROL IN Inhale into the lungs daily as needed       triamcinolone acetonide (KENALOG) 10 MG/ML injection See med note 5 mL 0     omalizumab (XOLAIR) 150 MG injection Inject 300 mg Subcutaneous       tacrolimus (PROTOPIC) 0.1 % ointment Apply topically 2 times daily (Patient not taking: Reported on 1/22/2018) 60 g 1     ferrous gluconate (FERGON) 324 (38 FE) MG tablet One tablet daily (Patient not taking: Reported on 1/22/2018) 60 tablet 1     Allergies   Allergen Reactions     Acetaminophen Hives     Penicillins Hives     Shellfish Allergy GI Disturbance     Silver Nitrate Hives     tegaderm adhesive dressing      Sulfa Drugs Hives     Vicodin [Hydrocodone-Acetaminophen] Hives       Review of Systems:  -Skin Establ Pt: The patient denies any new rash, pruritus, or lesions that are symptomatic, changing or bleeding, except as per HPI.  -Constitutional: The patient denies fatigue, fevers, chills, unintended weight loss, and night sweats.  -HEENT: Patient denies nonhealing oral sores.  -Skin: As above in HPI. No additional skin concerns.    Physical exam:  Vitals: /65 (BP Location: Right arm, Patient Position: Sitting)  Pulse 77  Ht 1.626 m (5' 4\")  Wt 72.6 kg (160 lb)  BMI 27.46 kg/m2  GEN: This is a well developed, well-nourished female in no acute distress, in a pleasant mood.    SKIN: Focused examination of the scalp was performed.  -Patchy alopecia appears improved compared to photographs from 11/13/2017.  -Regrowth noted in most patches, most significantly on the left parietal region. The right parietal region continues to have patches of alopecia.  -No scale or erythema of scalp noted  -Eyebrows and eyelashes with normal hair growth  -Hands are no longer xerotic - skin of hands appears healthy and moist  -No other lesions of concern " on areas examined.     Impression/Plan:  1. Alopecia areata with seborrheic dermatitis - overall improved with good regrowth noted in older patches, no new patches today  Kenalog intralesional injection procedure note: After verbal consent and discussion of risks including but not limited to atrophy, pain, and bruising, time out was performed, the patient underwent positioning, 3 total cc of Kenalog 10 mg/cc was injected into 30 sites on the scalp.  The patient tolerated the procedure well and left the Dermatology clinic in good condition.      Continue clobetasol 0.05% shampoo 3-4x per week.    Okay to stop the ketoconazole at this time; Instructed to restart ketoconazole if she notices scaling or if it helps with her scalp pruritus.    Continue rogaine 5% foam 3-4x per week.    Continue using dermasmoothe/FS 0.01% oil once per week - apply 1-2 capfuls to scalp, massage in and leave on overnight, wash out in the morning.    Photographs taken for future reference.    2. Hand Dermatitis - resolved, no xerosis on hands today    Continue applying Cetaphil cream daily to hands.    3. Chronic pressure/cold induced urticaria - well controlled with monthly Xolair injections    Continue Xolair injections.    Follow-up in 2 months, earlier for new or changing lesions.     Staff Involved:  Scribed by Catherine Smith, MS4 for Dr. Brown.      I agree with the PFSH and ROS as completed by the Medical Student. The remainder of the encounter was performed by me and scribed by the Medical Student. The scribed note accurately reflects my personal services and the medical decisions made by me. ILK injections were done together.       Nita Brown MD  Professor and Chair  Department of Dermatology  [facility]    Pictures were placed in Pt's chart today for future reference.                  Again, thank you for allowing me to participate in the care of your patient.      Sincerely,    Nita Brown,  MD

## 2018-01-22 NOTE — PROGRESS NOTES
"[facility] Dermatology Note      Dermatology Problem List:  1. Alopecia Areata, ILK 10,last injections November 13th, and again today  2. Hand Dermatitis, under good control, now only using Cetaphil  3. Urticaria, cold aggravated/induced, on Xolair    Encounter Date: Jan 22, 2018  Last Seen: Nov 13, 2017    CC:  Chief Complaint   Patient presents with     Hair Loss     Alopecia Areata. \"Continuing improvement\" since her last visit.       History of Present Illness:  Ms. Maria C Goodman is a 62 year old female who presents as a follow-up for alopecia areata. The patient was last seen on Nov 13, 2017 when 3 cc Kenalog 10 mg/cc was injected. She feels most of the injected areas have responded well and she has noted new hair growth. Since November, she has been applying dermasmoothe FS oil once per week and washing her hair with clobetasol 0.05% shampoo 2-3x per week. She also uses rogaine 5% foam 2-3x per week on the areas of alopecia and on the crown. She has not been using the ketoconazole shampoo. Sh has not been washing her hair daily. She is happy with the hair regrowth, but notes that her scalp is always mildly itchy. She has not noted flaking, scale, or erythema on her scalp. She denies scalp pain, burning, and tingling. Eyebrows/eyelashes are stable, and she notes no hair loss elsewhere.    She receives monthly Xolair injections for her chronic cold/pressure induced urticaria. She reports this is well-controlled.    She has a history of hand dermatitis, which she reports has now resolved. At her last visit she was instructed to use vaniply BID, but found this to be too thick. She has instead been using a Cetaphil cream daily, which has significantly improved her dermatitis.    Past Medical History:   Patient Active Problem List   Diagnosis     Alopecia areata     Dermatitis     Hypertrichosis     Urticaria     Autoimmune disease (H)     Past Medical History:   Diagnosis Date     Asthma      " Hypothyroidism      Lobular breast cancer (H)     s/p chemotherapy and radiation, in remission     Multiple allergies      No past surgical history on file.      Medications:  No recent changes in medications.  Current Outpatient Prescriptions   Medication Sig Dispense Refill     Fluocinolone Acetonide Scalp (DERMA-SMOOTHE/FS SCALP) 0.01 % OIL oil Apply topically once a week Use once a week. Apply 1-2 capfuls to dry scalp, massage in and leave on overnight, wash out in the morning 1 Bottle 3     clobetasol propionate (CLOBEX) 0.05 % SHAM Apply to a dry scalp, leave on for 10-15 minutes, then lather and rinse, do every other day 1 Bottle 2     fluocinonide (LIDEX) 0.05 % solution Use to treat any areas on the scalp that are itchy as needed. Use twice daily to any areas of complete hair loss. 60 mL 3     escitalopram (LEXAPRO) 20 MG tablet Take one tablet by mouth once daily with 10 mg tablet for total dose 30 mg.       ketoconazole (NIZORAL) 2 % shampoo Apply to scalp, lather, then rinse, do every other day alternating with Head & Shoulders 120 mL 5     clobetasol (TEMOVATE) 0.05 % ointment Apply a fine layer to the scalp at night after application of Rogaine, shampoo in am. A tube should last 2 months. 60 g 1     clobetasol (TEMOVATE) 0.05 % cream Apply a fine layer to scalp in the morning after shampooing, a tube should last 2 months 60 g 1     levothyroxine (SYNTHROID, LEVOTHROID) 112 MCG tablet Take by mouth daily       EXEMESTANE PO Take by mouth daily       montelukast (SINGULAIR) 10 MG tablet Take 10 mg by mouth At Bedtime       cetirizine (ZYRTEC) 10 MG tablet Take 10 mg by mouth daily       RANITIDINE HCL PO Take 150 mg by mouth 2 times daily       Cholecalciferol (VITAMIN D) 1000 UNITS capsule Take 5,000 Units by mouth daily       Calcium Carbonate-Vitamin D (CALCIUM + D PO) Take by mouth daily       ALBUTEROL IN Inhale into the lungs daily as needed       triamcinolone acetonide (KENALOG) 10 MG/ML  "injection See med note 5 mL 0     omalizumab (XOLAIR) 150 MG injection Inject 300 mg Subcutaneous       tacrolimus (PROTOPIC) 0.1 % ointment Apply topically 2 times daily (Patient not taking: Reported on 1/22/2018) 60 g 1     ferrous gluconate (FERGON) 324 (38 FE) MG tablet One tablet daily (Patient not taking: Reported on 1/22/2018) 60 tablet 1     Allergies   Allergen Reactions     Acetaminophen Hives     Penicillins Hives     Shellfish Allergy GI Disturbance     Silver Nitrate Hives     tegaderm adhesive dressing      Sulfa Drugs Hives     Vicodin [Hydrocodone-Acetaminophen] Hives       Review of Systems:  -Skin Establ Pt: The patient denies any new rash, pruritus, or lesions that are symptomatic, changing or bleeding, except as per HPI.  -Constitutional: The patient denies fatigue, fevers, chills, unintended weight loss, and night sweats.  -HEENT: Patient denies nonhealing oral sores.  -Skin: As above in HPI. No additional skin concerns.    Physical exam:  Vitals: /65 (BP Location: Right arm, Patient Position: Sitting)  Pulse 77  Ht 1.626 m (5' 4\")  Wt 72.6 kg (160 lb)  BMI 27.46 kg/m2  GEN: This is a well developed, well-nourished female in no acute distress, in a pleasant mood.    SKIN: Focused examination of the scalp was performed.  -Patchy alopecia appears improved compared to photographs from 11/13/2017.  -Regrowth noted in most patches, most significantly on the left parietal region. The right parietal region continues to have patches of alopecia.  -No scale or erythema of scalp noted  -Eyebrows and eyelashes with normal hair growth  -Hands are no longer xerotic - skin of hands appears healthy and moist  -No other lesions of concern on areas examined.     Impression/Plan:  1. Alopecia areata with seborrheic dermatitis - overall improved with good regrowth noted in older patches, no new patches today  Kenalog intralesional injection procedure note: After verbal consent and discussion of risks " including but not limited to atrophy, pain, and bruising, time out was performed, the patient underwent positioning, 3 total cc of Kenalog 10 mg/cc was injected into 30 sites on the scalp.  The patient tolerated the procedure well and left the Dermatology clinic in good condition.      Continue clobetasol 0.05% shampoo 3-4x per week.    Okay to stop the ketoconazole at this time; Instructed to restart ketoconazole if she notices scaling or if it helps with her scalp pruritus.    Continue rogaine 5% foam 3-4x per week.    Continue using dermasmoothe/FS 0.01% oil once per week - apply 1-2 capfuls to scalp, massage in and leave on overnight, wash out in the morning.    Photographs taken for future reference.    2. Hand Dermatitis - resolved, no xerosis on hands today    Continue applying Cetaphil cream daily to hands.    3. Chronic pressure/cold induced urticaria - well controlled with monthly Xolair injections    Continue Xolair injections.    Follow-up in 2 months, earlier for new or changing lesions.     Staff Involved:  Scribed by Catherine Smith, MS4 for Dr. Brown.      I agree with the PFSH and ROS as completed by the Medical Student. The remainder of the encounter was performed by me and scribed by the Medical Student. The scribed note accurately reflects my personal services and the medical decisions made by me. ILK injections were done together.       Nita Brown MD  Professor and Chair  Department of Dermatology  [facility]

## 2018-03-19 ENCOUNTER — OFFICE VISIT (OUTPATIENT)
Dept: DERMATOLOGY | Facility: CLINIC | Age: 63
End: 2018-03-19
Payer: COMMERCIAL

## 2018-03-19 VITALS — DIASTOLIC BLOOD PRESSURE: 74 MMHG | HEART RATE: 71 BPM | RESPIRATION RATE: 18 BRPM | SYSTOLIC BLOOD PRESSURE: 116 MMHG

## 2018-03-19 DIAGNOSIS — M35.9 AUTOIMMUNE DISEASE (H): ICD-10-CM

## 2018-03-19 DIAGNOSIS — L21.9 DERMATITIS, SEBORRHEIC: ICD-10-CM

## 2018-03-19 DIAGNOSIS — L63.9 ALOPECIA AREATA: Primary | ICD-10-CM

## 2018-03-19 ASSESSMENT — PAIN SCALES - GENERAL: PAINLEVEL: NO PAIN (0)

## 2018-03-19 NOTE — PROGRESS NOTES
[facility] Dermatology Note      Dermatology Problem List:  1. Alopecia Areata,   -s/p ILK  10  -Current Tx: Rogaine, ketoconazole, clobetasol 0.05% shampoo, dermasmoothe FS oil   2. Hand Dermatitis  3. Urticaria, cold aggravated/induced  -Current Tx: Xolair  4. Solar lentigines   -tretinoin 0.1% cream     Encounter Date: Mar 19, 2018  Last Seen: Nov 13, 2017    CC:  Chief Complaint   Patient presents with     Hair Loss     Maria C is here today for a hair loss follow up- Maria C notes improvement.        History of Present Illness:  Ms. Maria C Goodman is a 62 year old female who presents as a follow-up for alopecia areata. The patient was last seen on 1/22/2018 when the patient was treated with ILK 10 injections to affected areas. The patient reports today that she seen some improvement and was able to get a hair cut. The patient reports several spots on the nose of concern that look red to her. She has not used sunscreen when she went out, but does use sun screen regularly in the summer.     Her next concern is her pruritic scalp that is not connected with Xolair per pt report. The patient reports that she is using all the treatments recommended for her, but at a reduced frequency. The patient is using dermasmoothe oil x1 per week, ketoconazole x1-2 per week, the rogaine x3 per week, and the clobex shampoo x1-2 per week.     Past Medical History:   Patient Active Problem List   Diagnosis     Alopecia areata     Dermatitis     Hypertrichosis     Urticaria     Autoimmune disease (H)     Past Medical History:   Diagnosis Date     Asthma      Hypothyroidism      Lobular breast cancer (H)     s/p chemotherapy and radiation, in remission     Multiple allergies      History reviewed. No pertinent surgical history.      Medications:  No recent changes in medications.  Current Outpatient Prescriptions   Medication Sig Dispense Refill     triamcinolone acetonide (KENALOG) 10 MG/ML injection See med note 5 mL 0      Fluocinolone Acetonide Scalp (DERMA-SMOOTHE/FS SCALP) 0.01 % OIL oil Apply topically once a week Use once a week. Apply 1-2 capfuls to dry scalp, massage in and leave on overnight, wash out in the morning 1 Bottle 3     clobetasol propionate (CLOBEX) 0.05 % SHAM Apply to a dry scalp, leave on for 10-15 minutes, then lather and rinse, do every other day 1 Bottle 2     fluocinonide (LIDEX) 0.05 % solution Use to treat any areas on the scalp that are itchy as needed. Use twice daily to any areas of complete hair loss. 60 mL 3     escitalopram (LEXAPRO) 20 MG tablet Take one tablet by mouth once daily with 10 mg tablet for total dose 30 mg.       ketoconazole (NIZORAL) 2 % shampoo Apply to scalp, lather, then rinse, do every other day alternating with Head & Shoulders 120 mL 5     clobetasol (TEMOVATE) 0.05 % ointment Apply a fine layer to the scalp at night after application of Rogaine, shampoo in am. A tube should last 2 months. 60 g 1     clobetasol (TEMOVATE) 0.05 % cream Apply a fine layer to scalp in the morning after shampooing, a tube should last 2 months 60 g 1     levothyroxine (SYNTHROID, LEVOTHROID) 112 MCG tablet Take by mouth daily       EXEMESTANE PO Take by mouth daily       montelukast (SINGULAIR) 10 MG tablet Take 10 mg by mouth At Bedtime       cetirizine (ZYRTEC) 10 MG tablet Take 10 mg by mouth daily       RANITIDINE HCL PO Take 150 mg by mouth 2 times daily       Cholecalciferol (VITAMIN D) 1000 UNITS capsule Take 5,000 Units by mouth daily       Calcium Carbonate-Vitamin D (CALCIUM + D PO) Take by mouth daily       ALBUTEROL IN Inhale into the lungs daily as needed       omalizumab (XOLAIR) 150 MG injection Inject 300 mg Subcutaneous       tacrolimus (PROTOPIC) 0.1 % ointment Apply topically 2 times daily (Patient not taking: Reported on 1/22/2018) 60 g 1     ferrous gluconate (FERGON) 324 (38 FE) MG tablet One tablet daily (Patient not taking: Reported on 1/22/2018) 60 tablet 1     Allergies    Allergen Reactions     Acetaminophen Hives     Penicillins Hives     Shellfish Allergy GI Disturbance     Silver Nitrate Hives     tegaderm adhesive dressing      Sulfa Drugs Hives     Vicodin [Hydrocodone-Acetaminophen] Hives       Review of Systems:  -Skin: As per HPI   -Skin: As above in HPI. No additional skin concerns.    Physical exam:  Vitals: /74 (Cuff Size: Adult Regular)  Pulse 71  Resp 18  GEN: This is a well developed, well-nourished female in no acute distress, in a pleasant mood.    SKIN: Focused examination of the scalp was performed.  -Sebcaceous hyperplasia on the nasal ala  -Scattered brown macules on face  -Accentuation of the pores on the nasal ala.   -Eyebrows wnl  -Overall scalp is improved, patches on the mid frontal, left and right parietal scalp have improved.   -There is a new patch of hair loss on the parietal scalp.   - Hair pull tests are negative  -No other lesions of concern on areas examined.     Impression/Plan:  1. Alopecia areata with seborrheic dermatitis - Improving, patient was able to have hair cut, but notes that there is a new patch on the right parietal scalp. Smaller patches on the mid frontla and the right parietal scalp    Continue clobetasol 0.05% shampoo 1x per week.    Continue ketoconazole 1-2x per week.     Continue rogaine 5% foam 3x per week.    Continue using dermasmoothe/FS 0.01% oil once per week  Kenalog intralesional injection procedure note: After verbal consent and discussion of risks including but not limited to atrophy, pain, and bruising, time out was performed, the patient underwent positioning, 2.0 total cc of Kenalog 10 mg/cc was injected in 20 sites on the  scalp.  The patient tolerated the procedure well and left the Dermatology clinic in good condition.     Photographs taken for future reference.    2. Solar lentigines on the face and accentuation of the pores    Consider tretinoin 0.1% cream for the face     3. Hand Dermatitis     Start  Vaniply ointment for ongoing moisturization    4. Chronic pressure/cold induced urticaria - Clear today     Continue Xolair injections.    Follow-up in 2 months, earlier for new or changing lesions.       Scribe Disclosure:   I, Jeannette Abreu, am serving as a scribe to document services personally performed by Dr. Nita Brown, based on data collection and the provider's statements to me.     I agree with the PFSH and ROS as completed by the scribe. The remainder of the encounter was performed by me and scribed by Jeannette. The scribed note accurately reflects my personal services and the medical decisions made by me. ILK injections were done by me.    Nita Brown MD  Professor and Chair  Department of Dermatology  [facility]

## 2018-03-19 NOTE — LETTER
3/19/2018        RE: Maria C Goodman  [address] [place] [address]     Dear Colleague,    Thank you for referring your patient, Maria C Goodman, to the [facility] DERMATOLOGY at [facility]. Please see a copy of my visit note below.    [facility] Dermatology Note      Dermatology Problem List:  1. Alopecia Areata,   -s/p ILK  10  -Current Tx: Rogaine, ketoconazole, clobetasol 0.05% shampoo, dermasmoothe FS oil   2. Hand Dermatitis  3. Urticaria, cold aggravated/induced  -Current Tx: Xolair  4. Solar lentigines   -tretinoin 0.1% cream     Encounter Date: Mar 19, 2018  Last Seen: Nov 13, 2017    CC:  Chief Complaint   Patient presents with     Hair Loss     Maria C is here today for a hair loss follow up- Maria C notes improvement.        History of Present Illness:  Ms. Maria C Goodman is a 62 year old female who presents as a follow-up for alopecia areata. The patient was last seen on 1/22/2018 when the patient was treated with ILK 10 injections to affected areas. The patient reports today that she seen some improvement and was able to get a hair cut. The patient reports several spots on the nose of concern that look red to her. She has not used sunscreen when she went out, but does use sun screen regularly in the summer.     Her next concern is her pruritic scalp that is not connected with Xolair per pt report. The patient reports that she is using all the treatments recommended for her, but at a reduced frequency. The patient is using dermasmoothe oil x1 per week, ketoconazole x1-2 per week, the rogaine x3 per week, and the clobex shampoo x1-2 per week.     Past Medical History:   Patient Active Problem List   Diagnosis     Alopecia areata     Dermatitis     Hypertrichosis     Urticaria     Autoimmune disease (H)     Past Medical History:   Diagnosis Date     Asthma      Hypothyroidism      Lobular breast cancer (H)     s/p chemotherapy and radiation, in  remission     Multiple allergies      History reviewed. No pertinent surgical history.      Medications:  No recent changes in medications.  Current Outpatient Prescriptions   Medication Sig Dispense Refill     triamcinolone acetonide (KENALOG) 10 MG/ML injection See med note 5 mL 0     Fluocinolone Acetonide Scalp (DERMA-SMOOTHE/FS SCALP) 0.01 % OIL oil Apply topically once a week Use once a week. Apply 1-2 capfuls to dry scalp, massage in and leave on overnight, wash out in the morning 1 Bottle 3     clobetasol propionate (CLOBEX) 0.05 % SHAM Apply to a dry scalp, leave on for 10-15 minutes, then lather and rinse, do every other day 1 Bottle 2     fluocinonide (LIDEX) 0.05 % solution Use to treat any areas on the scalp that are itchy as needed. Use twice daily to any areas of complete hair loss. 60 mL 3     escitalopram (LEXAPRO) 20 MG tablet Take one tablet by mouth once daily with 10 mg tablet for total dose 30 mg.       ketoconazole (NIZORAL) 2 % shampoo Apply to scalp, lather, then rinse, do every other day alternating with Head & Shoulders 120 mL 5     clobetasol (TEMOVATE) 0.05 % ointment Apply a fine layer to the scalp at night after application of Rogaine, shampoo in am. A tube should last 2 months. 60 g 1     clobetasol (TEMOVATE) 0.05 % cream Apply a fine layer to scalp in the morning after shampooing, a tube should last 2 months 60 g 1     levothyroxine (SYNTHROID, LEVOTHROID) 112 MCG tablet Take by mouth daily       EXEMESTANE PO Take by mouth daily       montelukast (SINGULAIR) 10 MG tablet Take 10 mg by mouth At Bedtime       cetirizine (ZYRTEC) 10 MG tablet Take 10 mg by mouth daily       RANITIDINE HCL PO Take 150 mg by mouth 2 times daily       Cholecalciferol (VITAMIN D) 1000 UNITS capsule Take 5,000 Units by mouth daily       Calcium Carbonate-Vitamin D (CALCIUM + D PO) Take by mouth daily       ALBUTEROL IN Inhale into the lungs daily as needed       omalizumab (XOLAIR) 150 MG injection Inject  300 mg Subcutaneous       tacrolimus (PROTOPIC) 0.1 % ointment Apply topically 2 times daily (Patient not taking: Reported on 1/22/2018) 60 g 1     ferrous gluconate (FERGON) 324 (38 FE) MG tablet One tablet daily (Patient not taking: Reported on 1/22/2018) 60 tablet 1     Allergies   Allergen Reactions     Acetaminophen Hives     Penicillins Hives     Shellfish Allergy GI Disturbance     Silver Nitrate Hives     tegaderm adhesive dressing      Sulfa Drugs Hives     Vicodin [Hydrocodone-Acetaminophen] Hives       Review of Systems:  -Skin: As per HPI   -Skin: As above in HPI. No additional skin concerns.    Physical exam:  Vitals: /74 (Cuff Size: Adult Regular)  Pulse 71  Resp 18  GEN: This is a well developed, well-nourished female in no acute distress, in a pleasant mood.    SKIN: Focused examination of the scalp was performed.  -Sebcaceous hyperplasia on the nasal ala  -Scattered brown macules on face  -Accentuation of the pores on the nasal ala.   -Eyebrows wnl  -Overall scalp is improved, patches on the mid frontal, left and right parietal scalp have improved.   -There is a new patch of hair loss on the parietal scalp.   - Hair pull tests are negative  -No other lesions of concern on areas examined.     Impression/Plan:  1. Alopecia areata with seborrheic dermatitis - Improving, patient was able to have hair cut, but notes that there is a new patch on the right parietal scalp. Smaller patches on the mid frontla and the right parietal scalp    Continue clobetasol 0.05% shampoo 1x per week.    Continue ketoconazole 1-2x per week.     Continue rogaine 5% foam 3x per week.    Continue using dermasmoothe/FS 0.01% oil once per week  Kenalog intralesional injection procedure note: After verbal consent and discussion of risks including but not limited to atrophy, pain, and bruising, time out was performed, the patient underwent positioning, 2.0 total cc of Kenalog 10 mg/cc was injected in 20 sites on the   scalp.  The patient tolerated the procedure well and left the Dermatology clinic in good condition.     Photographs taken for future reference.    2. Solar lentigines on the face and accentuation of the pores    Consider tretinoin 0.1% cream for the face     3. Hand Dermatitis     Start Vaniply ointment for ongoing moisturization    4. Chronic pressure/cold induced urticaria - Clear today     Continue Xolair injections.    Follow-up in 2 months, earlier for new or changing lesions.       Scribe Disclosure:   I, Jeannette Abreu, am serving as a scribe to document services personally performed by Dr. Nita Brown, based on data collection and the provider's statements to me.     I agree with the PFSH and ROS as completed by the scribe. The remainder of the encounter was performed by me and scribed by Jeannette. The scribed note accurately reflects my personal services and the medical decisions made by me. ILK injections were done by me.    Nita Brown MD  Professor and Chair  Department of Dermatology  [facility]                                  Again, thank you for allowing me to participate in the care of your patient.      Sincerely,    Nita Brown MD

## 2018-03-19 NOTE — MR AVS SNAPSHOT
After Visit Summary   3/19/2018    Maria C Goodman    MRN: [id number]           Patient Information     Date Of Birth          1955        Visit Information        Provider Department      3/19/2018 2:40 PM Nita Brown MD [facility] Dermatology         Follow-ups after your visit        Your next 10 appointments already scheduled     Jun 06, 2018  2:15 PM CDT   (Arrive by 2:00 PM)   RETURN HAIRLOSS with CAROLINA Ramsey [facility] Dermatology ([facility] [place])    [address] [place] [id number] [phone]              Who to contact     Please call your clinic at [phone] to:    Ask questions about your health    Make or cancel appointments    Discuss your medicines    Learn about your test results    Speak to your doctor            Additional Information About Your Visit        MyChart Information     [employer] gives you secure access to your electronic health record. If you see a primary care provider, you can also send messages to your care team and make appointments. If you have questions, please call your primary care clinic.  If you do not have a primary care provider, please call [phone] and they will assist you.      [employer] is an electronic gateway that provides easy, online access to your medical records. With [employer], you can request a clinic appointment, read your test results, renew a prescription or communicate with your care team.     To access your existing account, please contact your [facility] or call [phone] for assistance.        Care EveryWhere ID     This is your Care EveryWhere ID. This could be used by other organizations to access your [place] medical records  [id number]        Your Vitals Were     Pulse Respirations                71 18           Blood Pressure from Last 3 Encounters:   03/19/18 116/74   01/22/18 120/65   11/13/17 117/70    Weight from  Last 3 Encounters:   01/22/18 72.6 kg (160 lb)   04/04/16 73.9 kg (163 lb)   12/14/15 73.9 kg (163 lb)              Today, you had the following     No orders found for display       Primary Care Provider Office Phone # Fax #    Damaris Mckeon [phone]       [address] [address]        Equal Access to Services     SAUL LACY : Hadii aad ku hadasho Soomaali, waaxda luqadaha, qaybta kaalmada adeegyada, waxay idiin hayaan adehussein florentinpaty lapetrona . So [facility] [phone].    ATENCIÓN: Si bailey nuñez, tiene a barboza disposición servicios gratuitos de asistencia lingüística. Llame al [phone].    We comply with applicable federal civil rights laws and [place] laws. We do not discriminate on the basis of race, color, national origin, age, disability, sex, sexual orientation, or gender identity.            Thank you!     Thank you for choosing [facility] DERMATOLOGY  for your care. Our goal is always to provide you with excellent care. Hearing back from our patients is one way we can continue to improve our services. Please take a few minutes to complete the written survey that you may receive in the mail after your visit with us. Thank you!             Your Updated Medication List - Protect others around you: Learn how to safely use, store and throw away your medicines at www.disposemymeds.org.          This list is accurate as of 3/19/18  3:41 PM.  Always use your most recent med list.                   Brand Name Dispense Instructions for use Diagnosis    ALBUTEROL IN      Inhale into the lungs daily as needed        CALCIUM + D PO      Take by mouth daily        cetirizine 10 MG tablet    zyrTEC     Take 10 mg by mouth daily        * clobetasol 0.05 % ointment    TEMOVATE    60 g    Apply a fine layer to the scalp at night after application of Rogaine, shampoo in am. A tube should last 2 months.    Autoimmune disease (H), Alopecia areata       * clobetasol 0.05 % cream     TEMOVATE    60 g    Apply a fine layer to scalp in the morning after shampooing, a tube should last 2 months    Autoimmune disease (H), Alopecia areata       * clobetasol propionate 0.05 % Sham    CLOBEX    1 Bottle    Apply to a dry scalp, leave on for 10-15 minutes, then lather and rinse, do every other day    Alopecia areata, Dermatitis       escitalopram 20 MG tablet    LEXAPRO     Take one tablet by mouth once daily with 10 mg tablet for total dose 30 mg.        EXEMESTANE PO      Take by mouth daily        ferrous gluconate 324 (38 FE) MG tablet    FERGON    60 tablet    One tablet daily    Low iron       Fluocinolone Acetonide Scalp 0.01 % Oil oil    DERMA-SMOOTHE/FS SCALP    1 Bottle    Apply topically once a week Use once a week. Apply 1-2 capfuls to dry scalp, massage in and leave on overnight, wash out in the morning    AA (alopecia areata)       fluocinonide 0.05 % solution    LIDEX    60 mL    Use to treat any areas on the scalp that are itchy as needed. Use twice daily to any areas of complete hair loss.    AA (alopecia areata)       ketoconazole 2 % shampoo    NIZORAL    120 mL    Apply to scalp, lather, then rinse, do every other day alternating with Head & Shoulders    Dermatitis, seborrheic       levothyroxine 112 MCG tablet    SYNTHROID/LEVOTHROID     Take by mouth daily        omalizumab 150 MG injection    XOLAIR     Inject 300 mg Subcutaneous        RANITIDINE HCL PO      Take 150 mg by mouth 2 times daily        SINGULAIR 10 MG tablet   Generic drug:  montelukast      Take 10 mg by mouth At Bedtime        tacrolimus 0.1 % ointment    PROTOPIC    60 g    Apply topically 2 times daily    Dermatitis       triamcinolone acetonide 10 MG/ML injection    KENALOG    5 mL    See med note    AA (alopecia areata)       vitamin D 1000 UNITS capsule      Take 5,000 Units by mouth daily        * Notice:  This list has 3 medication(s) that are the same as other medications prescribed for you. Read the  directions carefully, and ask your doctor or other care provider to review them with you.

## 2018-03-19 NOTE — NURSING NOTE
Dermatology Rooming Note    Maria C Goodman's goals for this visit include:   Chief Complaint   Patient presents with     Hair Loss     Maria C is here today for a hair loss follow up- Maria C notes improvement.      Jaymie Zaragoza MA

## 2018-04-01 PROBLEM — L21.9 DERMATITIS, SEBORRHEIC: Status: ACTIVE | Noted: 2018-04-01

## 2018-05-22 DIAGNOSIS — L30.9 DERMATITIS: ICD-10-CM

## 2018-05-22 DIAGNOSIS — L63.9 ALOPECIA AREATA: ICD-10-CM

## 2018-05-25 RX ORDER — CLOBETASOL PROPIONATE 0.05 G/100ML
SHAMPOO TOPICAL
Qty: 118 ML | Refills: 1 | Status: SHIPPED | OUTPATIENT
Start: 2018-05-25 | End: 2018-09-28

## 2018-05-25 NOTE — TELEPHONE ENCOUNTER
clobetasol propionate (CLOBEX) 0.05 % SHAM    Last Written Prescription Date:   9/1/17  Last Fill Quantity: 1 bottle ,   # refills: 2  Last Office Visit : 3/9/18  Future Office visit: 6/6/18

## 2018-06-06 ENCOUNTER — OFFICE VISIT (OUTPATIENT)
Dept: DERMATOLOGY | Facility: CLINIC | Age: 63
End: 2018-06-06
Payer: COMMERCIAL

## 2018-06-06 DIAGNOSIS — L21.9 DERMATITIS, SEBORRHEIC: ICD-10-CM

## 2018-06-06 DIAGNOSIS — L63.9 ALOPECIA AREATA: Primary | ICD-10-CM

## 2018-06-06 ASSESSMENT — PAIN SCALES - GENERAL: PAINLEVEL: NO PAIN (0)

## 2018-06-06 NOTE — PROGRESS NOTES
"[facility] Dermatology Note      Dermatology Problem List:  1. Alopecia Areata,   -s/p ILK  10  -Current Tx: Rogaine, ketoconazole, clobetasol 0.05% shampoo, dermasmoothe FS oil   2. Hand Dermatitis  3. Urticaria, cold aggravated/induced  -Current Tx: Xolair  4. Solar lentigines   -tretinoin 0.1% cream     Encounter Date: Jun 6, 2018  Last Seen: Nov 13, 2017    CC:  Chief Complaint   Patient presents with     Derm Problem     Maria C Junior states \" I may have a new patch.\"       History of Present Illness:  Ms. Maria C Goodman is a 63 year old female who presents as a follow-up for alopecia areata. The patient was last seen on 3/19/2018 during which she started vaniply ointment for her hand dermatitis and 20 sites on her scalp were injected with Kenalog 10 mg/cc for her alopecia areata with seborrheic dermatitis.     Today she reports that her scalp and hair are doing pretty well. She believes that she has good coverage of existing bare spots and hair growth. She believes that her hair is thinner than it has at previous visits. She believes that she has a new spot of concern above the nape of her neck. She reports that her scalp is a little itchy- but overall doing well. She reports to using the Rogaine foam twice a week, th dermasmoothe oil once a week and the clobetasol 2-3 times each week.      Overall she reports that her skin is very dry- possibly due to the change in weather. Otherwise the patient reports no additional painful, bleeding, nonhealing or pruritic lesions and denies any new or changing moles.    Past Medical History:   Patient Active Problem List   Diagnosis     Alopecia areata     Dermatitis     Hypertrichosis     Urticaria     Autoimmune disease (H)     Dermatitis, seborrheic     Past Medical History:   Diagnosis Date     Asthma      Hypothyroidism      Lobular breast cancer (H)     s/p chemotherapy and radiation, in remission     Multiple allergies      No past surgical history " on file.      Medications:  No recent changes in medications.  Current Outpatient Prescriptions   Medication Sig Dispense Refill     ALBUTEROL IN Inhale into the lungs daily as needed       Calcium Carbonate-Vitamin D (CALCIUM + D PO) Take by mouth daily       cetirizine (ZYRTEC) 10 MG tablet Take 10 mg by mouth daily       Cholecalciferol (VITAMIN D) 1000 UNITS capsule Take 5,000 Units by mouth daily       clobetasol (TEMOVATE) 0.05 % cream Apply a fine layer to scalp in the morning after shampooing, a tube should last 2 months 60 g 1     clobetasol (TEMOVATE) 0.05 % ointment Apply a fine layer to the scalp at night after application of Rogaine, shampoo in am. A tube should last 2 months. 60 g 1     escitalopram (LEXAPRO) 20 MG tablet Take one tablet by mouth once daily with 10 mg tablet for total dose 30 mg.       EXEMESTANE PO Take by mouth daily       Fluocinolone Acetonide Scalp (DERMA-SMOOTHE/FS SCALP) 0.01 % OIL oil Apply topically once a week Use once a week. Apply 1-2 capfuls to dry scalp, massage in and leave on overnight, wash out in the morning 1 Bottle 3     fluocinonide (LIDEX) 0.05 % solution Use to treat any areas on the scalp that are itchy as needed. Use twice daily to any areas of complete hair loss. 60 mL 3     ketoconazole (NIZORAL) 2 % shampoo Apply to scalp, lather, then rinse, do every other day alternating with Head & Shoulders 120 mL 5     levothyroxine (SYNTHROID, LEVOTHROID) 112 MCG tablet Take by mouth daily       montelukast (SINGULAIR) 10 MG tablet Take 10 mg by mouth At Bedtime       RANITIDINE HCL PO Take 150 mg by mouth 2 times daily       clobetasol propionate 0.05 % SHAM Apply to a dry scalp, leave on for 10-15 minutes, then lather and rinse, do every other day 118 mL 1     ferrous gluconate (FERGON) 324 (38 FE) MG tablet One tablet daily 60 tablet 1     omalizumab (XOLAIR) 150 MG injection Inject 300 mg Subcutaneous       tacrolimus (PROTOPIC) 0.1 % ointment Apply topically 2  times daily 60 g 1     triamcinolone acetonide (KENALOG) 10 MG/ML injection See med note 5 mL 0     Allergies   Allergen Reactions     Acetaminophen Hives     Penicillins Hives     Shellfish Allergy GI Disturbance     Silver Nitrate Hives     tegaderm adhesive dressing      Sulfa Drugs Hives     Vicodin [Hydrocodone-Acetaminophen] Hives       Review of Systems:  -Skin: As above in HPI. No additional skin concerns.    Physical exam:  Vitals: There were no vitals taken for this visit.  GEN: This is a well developed, well-nourished female in no acute distress, in a pleasant mood.    SKIN: Focused examination of the scalp was performed.  -Large patch of alopecia to occipital scalp 5 cm, with 2 adjacent 2 cm patches   -Scattered patches of alopecia throughout the scalp, mainly to the mid frontal, left and right parietal scalp have improved.   - Eyebrows within normal limits   -Negative hair pull test   -Minimal scale to the scalp.   -No other lesions of concern on areas examined.    Impression/Plan:  1. Alopecia areata with seborrheic dermatitis - Flaring with a large patch on the occipital scalp with two surrounding 2 cm patches. Alopecia scattered through out the scalp, increased kenalog today to 4 cc of 10 mg/ml.     Continue clobetasol 0.05% shampoo 1x per week.    Continue ketoconazole 1-2x per week.     Continue rogaine 5% foam 3x per week.    Continue using dermasmoothe/FS 0.01% oil once per week  Kenalog intralesional injection procedure note: After verbal consent and discussion of risks including but not limited to atrophy, pain, and bruising, time out was performed, the patient underwent positioning, 4.0 total cc of Kenalog 10 mg/cc was injected in 40 sites on the scalp. The patient tolerated the procedure well and left the Dermatology clinic in good condition.     Photographs taken for future reference.      Follow-up in 6-8 weeks, earlier for new or changing lesions.       Staff  involved:  Scribe/Staff    Scribe Disclosure:   I, Monik Ortiz, am serving as a scribe to document services personally performed by Alyce Logan PA-C, based on data collection and the provider's statements to me.    Provider Disclosure:   The documentation recorded by the scribe accurately reflects the services I personally performed and the decisions made by me.    All risks, benefits and alternatives were discussed with patient.  Patient is in agreement and understands the assessment and plan.  All questions were answered.  Sun Screen Education was given.   Return to Clinic in 6-8 weeks or sooner as needed.   Alyce Logan PA-C   [facility] Dermat[facility]

## 2018-06-06 NOTE — LETTER
"6/6/2018       RE: Maria C Goodman  [address] [place] [address]     Dear Colleague,    Thank you for referring your patient, Maria C Goodman, to the [facility] DERMATOLOGY at [facility]. Please see a copy of my visit note below.    [facility] Dermatology Note      Dermatology Problem List:  1. Alopecia Areata,   -s/p ILK  10  -Current Tx: Rogaine, ketoconazole, clobetasol 0.05% shampoo, dermasmoothe FS oil   2. Hand Dermatitis  3. Urticaria, cold aggravated/induced  -Current Tx: Xolair  4. Solar lentigines   -tretinoin 0.1% cream     Encounter Date: Jun 6, 2018  Last Seen: Nov 13, 2017    CC:  Chief Complaint   Patient presents with     Derm Problem     Hairernesto, Maria C states \" I may have a new patch.\"       History of Present Illness:  Ms. Maria C Goodman is a 63 year old female who presents as a follow-up for alopecia areata. The patient was last seen on 3/19/2018 during which she started vaniply ointment for her hand dermatitis and 20 sites on her scalp were injected with Kenalog 10 mg/cc for her alopecia areata with seborrheic dermatitis.     Today she reports that her scalp and hair are doing pretty well. She believes that she has good coverage of existing bare spots and hair growth. She believes that her hair is thinner than it has at previous visits. She believes that she has a new spot of concern above the nape of her neck. She reports that her scalp is a little itchy- but overall doing well. She reports to using the Rogaine foam twice a week, th dermasmoothe oil once a week and the clobetasol 2-3 times each week.      Overall she reports that her skin is very dry- possibly due to the change in weather. Otherwise the patient reports no additional painful, bleeding, nonhealing or pruritic lesions and denies any new or changing moles.    Past Medical History:   Patient Active Problem List   Diagnosis     Alopecia areata     Dermatitis     " Hypertrichosis     Urticaria     Autoimmune disease (H)     Dermatitis, seborrheic     Past Medical History:   Diagnosis Date     Asthma      Hypothyroidism      Lobular breast cancer (H)     s/p chemotherapy and radiation, in remission     Multiple allergies      No past surgical history on file.      Medications:  No recent changes in medications.  Current Outpatient Prescriptions   Medication Sig Dispense Refill     ALBUTEROL IN Inhale into the lungs daily as needed       Calcium Carbonate-Vitamin D (CALCIUM + D PO) Take by mouth daily       cetirizine (ZYRTEC) 10 MG tablet Take 10 mg by mouth daily       Cholecalciferol (VITAMIN D) 1000 UNITS capsule Take 5,000 Units by mouth daily       clobetasol (TEMOVATE) 0.05 % cream Apply a fine layer to scalp in the morning after shampooing, a tube should last 2 months 60 g 1     clobetasol (TEMOVATE) 0.05 % ointment Apply a fine layer to the scalp at night after application of Rogaine, shampoo in am. A tube should last 2 months. 60 g 1     escitalopram (LEXAPRO) 20 MG tablet Take one tablet by mouth once daily with 10 mg tablet for total dose 30 mg.       EXEMESTANE PO Take by mouth daily       Fluocinolone Acetonide Scalp (DERMA-SMOOTHE/FS SCALP) 0.01 % OIL oil Apply topically once a week Use once a week. Apply 1-2 capfuls to dry scalp, massage in and leave on overnight, wash out in the morning 1 Bottle 3     fluocinonide (LIDEX) 0.05 % solution Use to treat any areas on the scalp that are itchy as needed. Use twice daily to any areas of complete hair loss. 60 mL 3     ketoconazole (NIZORAL) 2 % shampoo Apply to scalp, lather, then rinse, do every other day alternating with Head & Shoulders 120 mL 5     levothyroxine (SYNTHROID, LEVOTHROID) 112 MCG tablet Take by mouth daily       montelukast (SINGULAIR) 10 MG tablet Take 10 mg by mouth At Bedtime       RANITIDINE HCL PO Take 150 mg by mouth 2 times daily       clobetasol propionate 0.05 % SHAM Apply to a dry scalp,  leave on for 10-15 minutes, then lather and rinse, do every other day 118 mL 1     ferrous gluconate (FERGON) 324 (38 FE) MG tablet One tablet daily 60 tablet 1     omalizumab (XOLAIR) 150 MG injection Inject 300 mg Subcutaneous       tacrolimus (PROTOPIC) 0.1 % ointment Apply topically 2 times daily 60 g 1     triamcinolone acetonide (KENALOG) 10 MG/ML injection See med note 5 mL 0     Allergies   Allergen Reactions     Acetaminophen Hives     Penicillins Hives     Shellfish Allergy GI Disturbance     Silver Nitrate Hives     tegaderm adhesive dressing      Sulfa Drugs Hives     Vicodin [Hydrocodone-Acetaminophen] Hives       Review of Systems:  -Skin: As above in HPI. No additional skin concerns.    Physical exam:  Vitals: There were no vitals taken for this visit.  GEN: This is a well developed, well-nourished female in no acute distress, in a pleasant mood.    SKIN: Focused examination of the scalp was performed.  -Large patch of alopecia to occipital scalp 5 cm, with 2 adjacent 2 cm patches   -Scattered patches of alopecia throughout the scalp, mainly to the mid frontal, left and right parietal scalp have improved.   - Eyebrows within normal limits   -Negative hair pull test   -Minimal scale to the scalp.   -No other lesions of concern on areas examined.    Impression/Plan:  1. Alopecia areata with seborrheic dermatitis - Flaring with a large patch on the occipital scalp with two surrounding 2 cm patches. Alopecia scattered through out the scalp, increased kenalog today to 4 cc of 10 mg/ml.     Continue clobetasol 0.05% shampoo 1x per week.    Continue ketoconazole 1-2x per week.     Continue rogaine 5% foam 3x per week.    Continue using dermasmoothe/FS 0.01% oil once per week  Kenalog intralesional injection procedure note: After verbal consent and discussion of risks including but not limited to atrophy, pain, and bruising, time out was performed, the patient underwent positioning, 4.0 total cc of Kenalog 10  mg/cc was injected in 40 sites on the scalp. The patient tolerated the procedure well and left the Dermatology clinic in good condition.     Photographs taken for future reference.      Follow-up in 6-8 weeks, earlier for new or changing lesions.       Staff involved:  Scribe/Staff    Scribe Disclosure:   I, Monik Ortiz, am serving as a scribe to document services personally performed by Alyce Logan PA-C, based on data collection and the provider's statements to me.    Provider Disclosure:   The documentation recorded by the scribe accurately reflects the services I personally performed and the decisions made by me.    All risks, benefits and alternatives were discussed with patient.  Patient is in agreement and understands the assessment and plan.  All questions were answered.  Sun Screen Education was given.   Return to Clinic in 6-8 weeks or sooner as needed.       Again, thank you for allowing me to participate in the care of your patient.      Sincerely,    Alyce Logan PA-C

## 2018-06-06 NOTE — NURSING NOTE
"Dermatology Rooming Note    Maria C Goodman's goals for this visit include:   Chief Complaint   Patient presents with     Derm Problem     Hairlotomás, Maria C states \" I may have a new patch.\"     Karol Mccarthy LPN  "

## 2018-06-06 NOTE — MR AVS SNAPSHOT
After Visit Summary   6/6/2018    Maria C Goodman    MRN: [id number]           Patient Information     Date Of Birth          1955        Visit Information        Provider Department      6/6/2018 2:15 PM Alyce Logan PA-C [facility] Dermatology        Today's Diagnoses     Alopecia areata    -  1    Dermatitis, seborrheic           Follow-ups after your visit        Your next 10 appointments already scheduled     Jul 26, 2018  1:30 PM CDT   (Arrive by 1:15 PM)   RETURN [facility] with CAROLINA Ramsey [facility] Dermatology ([facility])    [address]  [address] [facility] [address] [phone]              Who to contact     Please call your clinic at [phone] to:    Ask questions about your health    Make or cancel appointments    Discuss your medicines    Learn about your test results    Speak to your doctor            Additional Information About Your Visit        My[employer] Information     Loop gives you secure access to your electronic health record. If you see a primary care provider, you can also send messages to your care team and make appointments. If you have questions, please call your primary care clinic.  If you do not have a primary care provider, please call [phone] and they will assist you.      Loop is an electronic gateway that provides easy, online access to your medical records. With Loop, you can request a clinic appointment, read your test results, renew a prescription or communicate with your care team.     To access your existing account, please contact your [facility] or call [phone] for assistance.        Care EveryWhere ID     This is your Care EveryWhere ID. This could be used by other organizations to access your [place] medical records  [id number]         Blood Pressure from Last 3 Encounters:   03/19/18 116/74   01/22/18 120/65   11/13/17 117/70    Weight  from Last 3 Encounters:   01/22/18 72.6 kg (160 lb)   04/04/16 73.9 kg (163 lb)   12/14/15 73.9 kg (163 lb)              We Performed the Following     INJECTION INTO SKIN LESIONS >7          Today's Medication Changes          These changes are accurate as of 6/6/18  2:50 PM.  If you have any questions, ask your nurse or doctor.               These medicines have changed or have updated prescriptions.        Dose/Directions    * triamcinolone acetonide 10 MG/ML injection   Commonly known as:  KENALOG   This may have changed:  Another medication with the same name was added. Make sure you understand how and when to take each.   Used for:  AA (alopecia areata)        See med note   Quantity:  5 mL   Refills:  0       * triamcinolone acetonide 10 MG/ML injection   Commonly known as:  KENALOG   This may have changed:  You were already taking a medication with the same name, and this prescription was added. Make sure you understand how and when to take each.   Used for:  Dermatitis, seborrheic, Alopecia areata        Dose:  10 mg   Inject 1 mL (10 mg) into the skin once for 1 dose   Quantity:  5 mL   Refills:  0       * Notice:  This list has 2 medication(s) that are the same as other medications prescribed for you. Read the directions carefully, and ask your doctor or other care provider to review them with you.         Where to get your medicines      Some of these will need a paper prescription and others can be bought over the counter.  Ask your nurse if you have questions.     You don't need a prescription for these medications     triamcinolone acetonide 10 MG/ML injection                Primary Care Provider Office Phone # Fax #    Damaris Mckeon [phone]       [address] [address]        Equal Access to Services     SAUL LACY AH: kendra Melissa, thierry ham. So [facility]  [phone].    ATENCIÓN: Si bailey nuñez, tiene a barboza disposición servicios gratuitos de asistencia lingüística. Lonnie bolaños [phone].    We comply with applicable federal civil rights laws and [place] laws. We do not discriminate on the basis of race, color, national origin, age, disability, sex, sexual orientation, or gender identity.            Thank you!     Thank you for choosing [facility] DERMATOLOGY  for your care. Our goal is always to provide you with excellent care. Hearing back from our patients is one way we can continue to improve our services. Please take a few minutes to complete the written survey that you may receive in the mail after your visit with us. Thank you!             Your Updated Medication List - Protect others around you: Learn how to safely use, store and throw away your medicines at www.disposemymeds.org.          This list is accurate as of 6/6/18  2:50 PM.  Always use your most recent med list.                   Brand Name Dispense Instructions for use Diagnosis    ALBUTEROL IN      Inhale into the lungs daily as needed        CALCIUM + D PO      Take by mouth daily        cetirizine 10 MG tablet    zyrTEC     Take 10 mg by mouth daily        * clobetasol 0.05 % ointment    TEMOVATE    60 g    Apply a fine layer to the scalp at night after application of Rogaine, shampoo in am. A tube should last 2 months.    Autoimmune disease (H), Alopecia areata       * clobetasol 0.05 % cream    TEMOVATE    60 g    Apply a fine layer to scalp in the morning after shampooing, a tube should last 2 months    Autoimmune disease (H), Alopecia areata       * clobetasol propionate 0.05 % Sham     118 mL    Apply to a dry scalp, leave on for 10-15 minutes, then lather and rinse, do every other day    Alopecia areata, Dermatitis       escitalopram 20 MG tablet    LEXAPRO     Take one tablet by mouth once daily with 10 mg tablet for total dose 30 mg.        EXEMESTANE PO      Take by mouth daily         ferrous gluconate 324 (38 Fe) MG tablet    FERGON    60 tablet    One tablet daily    Low iron       Fluocinolone Acetonide Scalp 0.01 % Oil oil    DERMA-SMOOTHE/FS SCALP    1 Bottle    Apply topically once a week Use once a week. Apply 1-2 capfuls to dry scalp, massage in and leave on overnight, wash out in the morning    AA (alopecia areata)       fluocinonide 0.05 % solution    LIDEX    60 mL    Use to treat any areas on the scalp that are itchy as needed. Use twice daily to any areas of complete hair loss.    AA (alopecia areata)       ketoconazole 2 % shampoo    NIZORAL    120 mL    Apply to scalp, lather, then rinse, do every other day alternating with Head & Shoulders    Dermatitis, seborrheic       levothyroxine 112 MCG tablet    SYNTHROID/LEVOTHROID     Take by mouth daily        omalizumab 150 MG injection    XOLAIR     Inject 300 mg Subcutaneous        RANITIDINE HCL PO      Take 150 mg by mouth 2 times daily        SINGULAIR 10 MG tablet   Generic drug:  montelukast      Take 10 mg by mouth At Bedtime        tacrolimus 0.1 % ointment    PROTOPIC    60 g    Apply topically 2 times daily    Dermatitis       * triamcinolone acetonide 10 MG/ML injection    KENALOG    5 mL    See med note    AA (alopecia areata)       * triamcinolone acetonide 10 MG/ML injection    KENALOG    5 mL    Inject 1 mL (10 mg) into the skin once for 1 dose    Dermatitis, seborrheic, Alopecia areata       vitamin D 1000 units capsule      Take 5,000 Units by mouth daily        * Notice:  This list has 5 medication(s) that are the same as other medications prescribed for you. Read the directions carefully, and ask your doctor or other care provider to review them with you.

## 2018-06-07 NOTE — NURSING NOTE
Drug Administration Record    Drug Name: triamcinolone acetonide(kenalog)  Dose: 4mL of triamcinolone 10mg/mL, 40mg dose  Route administered: ID  NDC #: Kenalog-10 (3744-6176-49)  Amount of waste(mL):1  Reason for waste: Single use vial

## 2018-07-26 ENCOUNTER — OFFICE VISIT (OUTPATIENT)
Dept: DERMATOLOGY | Facility: CLINIC | Age: 63
End: 2018-07-26
Payer: COMMERCIAL

## 2018-07-26 DIAGNOSIS — L21.9 DERMATITIS, SEBORRHEIC: ICD-10-CM

## 2018-07-26 DIAGNOSIS — L63.9 ALOPECIA AREATA: Primary | ICD-10-CM

## 2018-07-26 ASSESSMENT — PAIN SCALES - GENERAL
PAINLEVEL: NO PAIN (0)
PAINLEVEL: NO PAIN (0)

## 2018-07-26 NOTE — PROGRESS NOTES
[facility] Dermatology Note      Dermatology Problem List:  1. Alopecia Areata,   -s/p ILK  10  -Current Tx: Rogaine, ketoconazole, clobetasol 0.05% shampoo, dermasmoothe FS oil   2. Hand Dermatitis  3. Urticaria, cold aggravated/induced  -Current Tx: Xolair  4. Solar lentigines   -tretinoin 0.1% cream     Encounter Date: Jul 26, 2018  Last Seen: Nov 13, 2017    CC:  Chief Complaint   Patient presents with     RECHECK     Maria C is here today for a follow up post injections on her scalp. She would like to have more injections today. She states that the back of the head is still bare and the left side patch as well.        History of Present Illness:  Ms. Maria C Goodman is a 63 year old female who presents as a follow-up for alopecia areata. The patient was last seen in the dermatology clinic on 6/6/2018 during which 4.0 ml of Kenalog 10 were administered into 40 sites on her scalp.     Today the patient reports her scalp is doing alright. She is feeling discouraged and does not think there is any growth. She thinks the same patches are there. She thinks there is a new patch of alopecia above her right ear.     Overall she reports again that her skin is very dry- possibly due to the change in weather. Otherwise the patient reports no additional painful, bleeding, nonhealing or pruritic lesions and denies any new or changing moles.    Past Medical History:   Patient Active Problem List   Diagnosis     Alopecia areata     Dermatitis     Hypertrichosis     Urticaria     Autoimmune disease (H)     Dermatitis, seborrheic     Past Medical History:   Diagnosis Date     Asthma      Hypothyroidism      Lobular breast cancer (H)     s/p chemotherapy and radiation, in remission     Multiple allergies      No past surgical history on file.      Medications:  No recent changes in medications.  Current Outpatient Prescriptions   Medication Sig Dispense Refill     ALBUTEROL IN Inhale into the lungs daily as needed        Calcium Carbonate-Vitamin D (CALCIUM + D PO) Take by mouth daily       cetirizine (ZYRTEC) 10 MG tablet Take 10 mg by mouth daily       Cholecalciferol (VITAMIN D) 1000 UNITS capsule Take 5,000 Units by mouth daily       clobetasol (TEMOVATE) 0.05 % cream Apply a fine layer to scalp in the morning after shampooing, a tube should last 2 months 60 g 1     clobetasol (TEMOVATE) 0.05 % ointment Apply a fine layer to the scalp at night after application of Rogaine, shampoo in am. A tube should last 2 months. 60 g 1     clobetasol propionate 0.05 % SHAM Apply to a dry scalp, leave on for 10-15 minutes, then lather and rinse, do every other day 118 mL 1     escitalopram (LEXAPRO) 20 MG tablet Take one tablet by mouth once daily with 10 mg tablet for total dose 30 mg.       EXEMESTANE PO Take by mouth daily       ferrous gluconate (FERGON) 324 (38 FE) MG tablet One tablet daily 60 tablet 1     Fluocinolone Acetonide Scalp (DERMA-SMOOTHE/FS SCALP) 0.01 % OIL oil Apply topically once a week Use once a week. Apply 1-2 capfuls to dry scalp, massage in and leave on overnight, wash out in the morning 1 Bottle 3     fluocinonide (LIDEX) 0.05 % solution Use to treat any areas on the scalp that are itchy as needed. Use twice daily to any areas of complete hair loss. 60 mL 3     ketoconazole (NIZORAL) 2 % shampoo Apply to scalp, lather, then rinse, do every other day alternating with Head & Shoulders 120 mL 5     levothyroxine (SYNTHROID, LEVOTHROID) 112 MCG tablet Take by mouth daily       montelukast (SINGULAIR) 10 MG tablet Take 10 mg by mouth At Bedtime       omalizumab (XOLAIR) 150 MG injection Inject 300 mg Subcutaneous       RANITIDINE HCL PO Take 150 mg by mouth 2 times daily       tacrolimus (PROTOPIC) 0.1 % ointment Apply topically 2 times daily 60 g 1     triamcinolone acetonide (KENALOG) 10 MG/ML injection See med note 5 mL 0     Allergies   Allergen Reactions     Acetaminophen Hives     Penicillins Hives      Shellfish Allergy GI Disturbance     Silver Nitrate Hives     tegaderm adhesive dressing      Sulfa Drugs Hives     Vicodin [Hydrocodone-Acetaminophen] Hives       Review of Systems:  - Constitutional: The patient is feeling generally well   - Skin: As above in HPI. No additional skin concerns.    Physical exam:  Vitals: There were no vitals taken for this visit.  GEN: This is a well developed, well-nourished female in no acute distress, in a pleasant mood.    SKIN: Focused examination of the face, scalp and hands was performed.  - There is a new 2 cm patch of alopecia just superior to the right ear, new area on the left lower occipital scalp and right lower occipital scalp    -Large patch of alopecia to occipital scalp 5 cm, with 2 adjacent 2 cm patches, regrowth noted throughout   -Scattered patches of alopecia throughout the scalp, mainly to the mid frontal, left and right parietal scalp have improved.   - Eyebrows within normal limits   - Negative hair pull test   - Minimal scale to the scalp.   - No other lesions of concern on areas examined.    Impression/Plan:  1. Alopecia areata responsive to therapy with 3 new areas. Hx of seborrheic dermatitis (well controlled)    Continue clobetasol 0.05% shampoo 1x per week.    Continue ketoconazole 1-2x per week.     Continue rogaine 5% foam 3x per week.    Continue using dermasmoothe/FS 0.01% oil once per week  Kenalog intralesional injection procedure note: After verbal consent and discussion of risks including but not limited to atrophy, pain, and bruising, time out was performed, the patient underwent positioning, 4.0  total cc of Kenalog 10 mg/cc was injected in 40 sites on the scalp. The patient tolerated the procedure well and left the Dermatology clinic in good condition.     Photodocumentation taken for future reference.    Follow-up in 10-12 weeks, earlier for new or changing lesions.     Staff involved:  Scribe/Staff    Scribe Disclosure:   Monik EWING am  serving as a scribe to document services personally performed by Alyce Logan PA-C, based on data collection and the provider's statements to me.    Provider Disclosure:   The documentation recorded by the scribe accurately reflects the services I personally performed and the decisions made by me.    All risks, benefits and alternatives were discussed with patient.  Patient is in agreement and understands the assessment and plan.  All questions were answered.  Sun Screen Education was given.   Return to Clinic in 10-12 weeks or sooner as needed.   Alyce Logan PA-C   [facility] Dermatology Clinic

## 2018-07-26 NOTE — NURSING NOTE
Drug Administration Record    Drug Name: triamcinolone acetonide(kenalog)  Dose: 4mL of triamcinolone 10mg/mL, 40mg dose  Route administered: ID  NDC #: Kenalog-10 (1171-0176-19)  Amount of waste(mL):1ml  Reason for waste: Single use vial    LOT #: [id number]  SITE: [facility]  : [employer]  EXPIRATION DATE: 02/2020

## 2018-07-26 NOTE — NURSING NOTE
Chief Complaint   Patient presents with     RECHECK     Maria C is here today for a follow up post injections on her scalp. She would like to have more injections today. She states that the back of the head is still bare and the left side patch as well.      Юлия Reddy, CMA

## 2018-07-26 NOTE — LETTER
7/26/2018       RE: Maria C Goodman  [address] [place] [address]     Dear Colleague,    Thank you for referring your patient, Maria C Goodman, to the [facility] DERMATOLOGY at [facility]. Please see a copy of my visit note below.    [facility] Dermatology Note      Dermatology Problem List:  1. Alopecia Areata,   -s/p ILK  10  -Current Tx: Rogaine, ketoconazole, clobetasol 0.05% shampoo, dermasmoothe FS oil   2. Hand Dermatitis  3. Urticaria, cold aggravated/induced  -Current Tx: Xolair  4. Solar lentigines   -tretinoin 0.1% cream     Encounter Date: Jul 26, 2018  Last Seen: Nov 13, 2017    CC:  Chief Complaint   Patient presents with     RECHECK     Maria C is here today for a follow up post injections on her scalp. She would like to have more injections today. She states that the back of the head is still bare and the left side patch as well.        History of Present Illness:  Ms. Marai C Goodman is a 63 year old female who presents as a follow-up for alopecia areata. The patient was last seen in the dermatology clinic on 6/6/2018 during which 4.0 ml of Kenalog 10 were administered into 40 sites on her scalp.     Today the patient reports her scalp is doing alright. She is feeling discouraged and does not think there is any growth. She thinks the same patches are there. She thinks there is a new patch of alopecia above her right ear.     Overall she reports again that her skin is very dry- possibly due to the change in weather. Otherwise the patient reports no additional painful, bleeding, nonhealing or pruritic lesions and denies any new or changing moles.    Past Medical History:   Patient Active Problem List   Diagnosis     Alopecia areata     Dermatitis     Hypertrichosis     Urticaria     Autoimmune disease (H)     Dermatitis, seborrheic     Past Medical History:   Diagnosis Date     Asthma      Hypothyroidism      Lobular breast cancer (H)     s/p  chemotherapy and radiation, in remission     Multiple allergies      No past surgical history on file.      Medications:  No recent changes in medications.  Current Outpatient Prescriptions   Medication Sig Dispense Refill     ALBUTEROL IN Inhale into the lungs daily as needed       Calcium Carbonate-Vitamin D (CALCIUM + D PO) Take by mouth daily       cetirizine (ZYRTEC) 10 MG tablet Take 10 mg by mouth daily       Cholecalciferol (VITAMIN D) 1000 UNITS capsule Take 5,000 Units by mouth daily       clobetasol (TEMOVATE) 0.05 % cream Apply a fine layer to scalp in the morning after shampooing, a tube should last 2 months 60 g 1     clobetasol (TEMOVATE) 0.05 % ointment Apply a fine layer to the scalp at night after application of Rogaine, shampoo in am. A tube should last 2 months. 60 g 1     clobetasol propionate 0.05 % SHAM Apply to a dry scalp, leave on for 10-15 minutes, then lather and rinse, do every other day 118 mL 1     escitalopram (LEXAPRO) 20 MG tablet Take one tablet by mouth once daily with 10 mg tablet for total dose 30 mg.       EXEMESTANE PO Take by mouth daily       ferrous gluconate (FERGON) 324 (38 FE) MG tablet One tablet daily 60 tablet 1     Fluocinolone Acetonide Scalp (DERMA-SMOOTHE/FS SCALP) 0.01 % OIL oil Apply topically once a week Use once a week. Apply 1-2 capfuls to dry scalp, massage in and leave on overnight, wash out in the morning 1 Bottle 3     fluocinonide (LIDEX) 0.05 % solution Use to treat any areas on the scalp that are itchy as needed. Use twice daily to any areas of complete hair loss. 60 mL 3     ketoconazole (NIZORAL) 2 % shampoo Apply to scalp, lather, then rinse, do every other day alternating with Head & Shoulders 120 mL 5     levothyroxine (SYNTHROID, LEVOTHROID) 112 MCG tablet Take by mouth daily       montelukast (SINGULAIR) 10 MG tablet Take 10 mg by mouth At Bedtime       omalizumab (XOLAIR) 150 MG injection Inject 300 mg Subcutaneous       RANITIDINE HCL PO Take  150 mg by mouth 2 times daily       tacrolimus (PROTOPIC) 0.1 % ointment Apply topically 2 times daily 60 g 1     triamcinolone acetonide (KENALOG) 10 MG/ML injection See med note 5 mL 0     Allergies   Allergen Reactions     Acetaminophen Hives     Penicillins Hives     Shellfish Allergy GI Disturbance     Silver Nitrate Hives     tegaderm adhesive dressing      Sulfa Drugs Hives     Vicodin [Hydrocodone-Acetaminophen] Hives       Review of Systems:  - Constitutional: The patient is feeling generally well   - Skin: As above in HPI. No additional skin concerns.    Physical exam:  Vitals: There were no vitals taken for this visit.  GEN: This is a well developed, well-nourished female in no acute distress, in a pleasant mood.    SKIN: Focused examination of the face, scalp and hands was performed.  - There is a new 2 cm patch of alopecia just superior to the right ear, new area on the left lower occipital scalp and right lower occipital scalp    -Large patch of alopecia to occipital scalp 5 cm, with 2 adjacent 2 cm patches, regrowth noted throughout   -Scattered patches of alopecia throughout the scalp, mainly to the mid frontal, left and right parietal scalp have improved.   - Eyebrows within normal limits   - Negative hair pull test   - Minimal scale to the scalp.   - No other lesions of concern on areas examined.    Impression/Plan:  1. Alopecia areata responsive to therapy with 3 new areas. Hx of seborrheic dermatitis (well controlled)    Continue clobetasol 0.05% shampoo 1x per week.    Continue ketoconazole 1-2x per week.     Continue rogaine 5% foam 3x per week.    Continue using dermasmoothe/FS 0.01% oil once per week  Kenalog intralesional injection procedure note: After verbal consent and discussion of risks including but not limited to atrophy, pain, and bruising, time out was performed, the patient underwent positioning, 4.0  total cc of Kenalog 10 mg/cc was injected in 40 sites on the scalp. The patient  tolerated the procedure well and left the Dermatology clinic in good condition.     Photodocumentation taken for future reference.    Follow-up in 10-12 weeks, earlier for new or changing lesions.     Staff involved:  Scribe/Staff    Scribe Disclosure:   I, Monik Ortiz, am serving as a scribe to document services personally performed by Alyce Logan PA-C, based on data collection and the provider's statements to me.    Provider Disclosure:   The documentation recorded by the scribe accurately reflects the services I personally performed and the decisions made by me.    All risks, benefits and alternatives were discussed with patient.  Patient is in agreement and understands the assessment and plan.  All questions were answered.  Sun Screen Education was given.   Return to Clinic in 10-12 weeks or sooner as needed.   Alyce Logan PA-C   [facility]

## 2018-07-26 NOTE — MR AVS SNAPSHOT
After Visit Summary   7/26/2018    Maria C Goodman    MRN: [id number]           Patient Information     Date Of Birth          1955        Visit Information        Provider Department      7/26/2018 1:30 PM Alyce Logan PA-C [facility] Dermat[facility]         Follow-ups after your visit        Your next 10 appointments already scheduled     Oct 17, 2018  9:45 AM CDT   (Arrive by 9:30 AM)   RETURN HAIRLOSS with CAROLINA Ramsey [facility] Dermatology ([facility])    [address] [place] [address] [phone]              Who to contact     Please call your clinic at [phone] to:    Ask questions about your health    Make or cancel appointments    Discuss your medicines    Learn about your test results    Speak to your doctor            Additional Information About Your Visit        MyChart Information     [employer] gives you secure access to your electronic health record. If you see a primary care provider, you can also send messages to your care team and make appointments. If you have questions, please call your primary care clinic.  If you do not have a primary care provider, please call [phone] and they will assist you.      [employer] is an electronic gateway that provides easy, online access to your medical records. With [employer], you can request a clinic appointment, read your test results, renew a prescription or communicate with your care team.     To access your existing account, please contact your [facility] or call [phone] for assistance.        Care EveryWhere ID     This is your Care EveryWhere ID. This could be used by other organizations to access your [facility] medical records  [id number]         Blood Pressure from Last 3 Encounters:   03/19/18 116/74   01/22/18 120/65   11/13/17 117/70    Weight from Last 3 Encounters:   01/22/18 72.6 kg (160 lb)   04/04/16 73.9 kg (163 lb)    12/14/15 73.9 kg (163 lb)              Today, you had the following     No orders found for display       Primary Care Provider Office Phone # Fax #    Damaris Mckeon [phone]       [place] [id number] [facility] [address]        Equal Access to Services     JACKSONCAROLINA REGINO : Hadii aad ku hadasho Soomaali, waaxda luqadaha, qaybta kaalmada adeegyada, waxay sejalin treyn adehussein khan lafarhadnatalee ah. So [facility] [phone].    ATENCIÓN: Si habla español, tiene a barboza disposición servicios gratuitos de asistencia lingüística. [facility] [phone].    We comply with applicable federal civil rights laws and [place] laws. We do not discriminate on the basis of race, color, national origin, age, disability, sex, sexual orientation, or gender identity.            Thank you!     Thank you for choosing [facility] DERMATOLOGY  for your care. Our goal is always to provide you with excellent care. Hearing back from our patients is one way we can continue to improve our services. Please take a few minutes to complete the written survey that you may receive in the mail after your visit with us. Thank you!             Your Updated Medication List - Protect others around you: Learn how to safely use, store and throw away your medicines at www.disposemymeds.org.          This list is accurate as of 7/26/18  1:53 PM.  Always use your most recent med list.                   Brand Name Dispense Instructions for use Diagnosis    ALBUTEROL IN      Inhale into the lungs daily as needed        CALCIUM + D PO      Take by mouth daily        cetirizine 10 MG tablet    zyrTEC     Take 10 mg by mouth daily        * clobetasol 0.05 % ointment    TEMOVATE    60 g    Apply a fine layer to the scalp at night after application of Rogaine, shampoo in am. A tube should last 2 months.    Autoimmune disease (H), Alopecia areata       * clobetasol 0.05 % cream    TEMOVATE    60 g    Apply a fine layer to scalp in the morning after  shampooing, a tube should last 2 months    Autoimmune disease (H), Alopecia areata       * clobetasol propionate 0.05 % Sham     118 mL    Apply to a dry scalp, leave on for 10-15 minutes, then lather and rinse, do every other day    Alopecia areata, Dermatitis       escitalopram 20 MG tablet    LEXAPRO     Take one tablet by mouth once daily with 10 mg tablet for total dose 30 mg.        EXEMESTANE PO      Take by mouth daily        ferrous gluconate 324 (38 Fe) MG tablet    FERGON    60 tablet    One tablet daily    Low iron       Fluocinolone Acetonide Scalp 0.01 % Oil oil    DERMA-SMOOTHE/FS SCALP    1 Bottle    Apply topically once a week Use once a week. Apply 1-2 capfuls to dry scalp, massage in and leave on overnight, wash out in the morning    AA (alopecia areata)       fluocinonide 0.05 % solution    LIDEX    60 mL    Use to treat any areas on the scalp that are itchy as needed. Use twice daily to any areas of complete hair loss.    AA (alopecia areata)       ketoconazole 2 % shampoo    NIZORAL    120 mL    Apply to scalp, lather, then rinse, do every other day alternating with Head & Shoulders    Dermatitis, seborrheic       levothyroxine 112 MCG tablet    SYNTHROID/LEVOTHROID     Take by mouth daily        omalizumab 150 MG injection    XOLAIR     Inject 300 mg Subcutaneous        RANITIDINE HCL PO      Take 150 mg by mouth 2 times daily        SINGULAIR 10 MG tablet   Generic drug:  montelukast      Take 10 mg by mouth At Bedtime        tacrolimus 0.1 % ointment    PROTOPIC    60 g    Apply topically 2 times daily    Dermatitis       triamcinolone acetonide 10 MG/ML injection    KENALOG    5 mL    See med note    AA (alopecia areata)       vitamin D 1000 units capsule      Take 5,000 Units by mouth daily        * Notice:  This list has 3 medication(s) that are the same as other medications prescribed for you. Read the directions carefully, and ask your doctor or other care provider to review them with  you.

## 2018-09-28 DIAGNOSIS — L63.9 ALOPECIA AREATA: ICD-10-CM

## 2018-09-28 DIAGNOSIS — L30.9 DERMATITIS: ICD-10-CM

## 2018-10-01 RX ORDER — CLOBETASOL PROPIONATE 0.05 G/100ML
SHAMPOO TOPICAL
Qty: 118 ML | Refills: 0 | Status: SHIPPED | OUTPATIENT
Start: 2018-10-01 | End: 2018-10-17

## 2018-10-17 ENCOUNTER — OFFICE VISIT (OUTPATIENT)
Dept: DERMATOLOGY | Facility: CLINIC | Age: 63
End: 2018-10-17
Payer: COMMERCIAL

## 2018-10-17 DIAGNOSIS — L63.9 ALOPECIA AREATA: ICD-10-CM

## 2018-10-17 DIAGNOSIS — L63.9 AA (ALOPECIA AREATA): ICD-10-CM

## 2018-10-17 DIAGNOSIS — L30.9 DERMATITIS: ICD-10-CM

## 2018-10-17 RX ORDER — FLUOCINONIDE TOPICAL SOLUTION USP, 0.05% 0.5 MG/ML
SOLUTION TOPICAL
Qty: 60 ML | Refills: 11 | Status: SHIPPED | OUTPATIENT
Start: 2018-10-17 | End: 2019-02-19

## 2018-10-17 RX ORDER — CLOBETASOL PROPIONATE 0.05 G/100ML
SHAMPOO TOPICAL
Qty: 118 ML | Refills: 11 | Status: SHIPPED | OUTPATIENT
Start: 2018-10-17 | End: 2019-02-19

## 2018-10-17 RX ORDER — FLUOCINOLONE ACETONIDE 0.11 MG/ML
OIL TOPICAL WEEKLY
Qty: 1 BOTTLE | Refills: 11 | Status: SHIPPED | OUTPATIENT
Start: 2018-10-17 | End: 2019-02-19

## 2018-10-17 ASSESSMENT — PAIN SCALES - GENERAL: PAINLEVEL: NO PAIN (0)

## 2018-10-17 NOTE — NURSING NOTE
Drug Administration Record    Drug Name: triamcinolone acetonide(kenalog)  Dose: 4 mL of triamcinolone 10mg/mL, 40mg dose  Route administered: ID  NDC #: Kenalog-10 (4583-6222-63)  Amount of waste(mL):1mL  Reason for waste: Single use vial    LOT #: [id number]  SITE: SEE PROVIDER NOTE   : [employer]  EXPIRATION DATE: APR2020    Pete Rojas LPN

## 2018-10-17 NOTE — PROGRESS NOTES
[facility] Dermatology Note      Dermatology Problem List:  1. Alopecia Areata,   -s/p ILK  10  -Current Tx: Rogaine, ketoconazole, clobetasol 0.05% shampoo, dermasmoothe FS oil   2. Hand Dermatitis  3. Urticaria, cold aggravated/induced  -Current Tx: Xolair  4. Solar lentigines   -tretinoin 0.1% cream     Encounter Date: Oct 17, 2018  Last Seen: 2017    CC:  Chief Complaint   Patient presents with     Hair Loss     Maria C is here for a follow up hairloss       History of Present Illness:  Ms. Maria C Goodman is a 63 year old female who presents as a follow-up for alopecia areata. The patient was last seen in the dermatology clinic on 18 during which 4.0 ml of ILK-10 were injected into 40 sites on the scalp for her alopecia areata.     Today she reports that the hair on the top of her head is much thinner. She notes it is getting more challenging to cover her spots of alopecia. She denies losing a lot of hair all at the same time, as it is a more gradual and persistent hair loss. She also reports that her scalp as been itchier than it has previously been. She also would like to discuss her medications today, as she has several she has questions about. She is still using her clobetasol shampoo and dermasmooth oil consistently. She does not use the clobetasol 0.05% cream and fluocinonide 0.05% solution regularly and the bottles she does have are .     Overall she reports again that her skin is very dry- possibly due to the change in weather. Otherwise the patient reports no additional painful, bleeding, nonhealing or pruritic lesions and denies any new or changing moles.    Past Medical History:   Patient Active Problem List   Diagnosis     Alopecia areata     Dermatitis     Hypertrichosis     Urticaria     Autoimmune disease (H)     Dermatitis, seborrheic     Past Medical History:   Diagnosis Date     Asthma      Hypothyroidism      Lobular breast cancer (H)     s/p chemotherapy and  radiation, in remission     Multiple allergies      History reviewed. No pertinent surgical history.      Medications:  No recent changes in medications.  Current Outpatient Prescriptions   Medication Sig Dispense Refill     ALBUTEROL IN Inhale into the lungs daily as needed       Calcium Carbonate-Vitamin D (CALCIUM + D PO) Take by mouth daily       cetirizine (ZYRTEC) 10 MG tablet Take 10 mg by mouth daily       Cholecalciferol (VITAMIN D) 1000 UNITS capsule Take 5,000 Units by mouth daily       clobetasol (TEMOVATE) 0.05 % cream Apply a fine layer to scalp in the morning after shampooing, a tube should last 2 months 60 g 1     clobetasol (TEMOVATE) 0.05 % ointment Apply a fine layer to the scalp at night after application of Rogaine, shampoo in am. A tube should last 2 months. 60 g 1     clobetasol propionate 0.05 % SHAM APPLY TOPICALLY TO A DRY SCALP, LEAVE ON FOR 10-15 MINUTES, THEN LATHER AND RINSE. DO EVERY OTHER  mL 0     escitalopram (LEXAPRO) 20 MG tablet Take one tablet by mouth once daily with 10 mg tablet for total dose 30 mg.       EXEMESTANE PO Take by mouth daily       ferrous gluconate (FERGON) 324 (38 FE) MG tablet One tablet daily (Patient not taking: Reported on 10/17/2018) 60 tablet 1     Fluocinolone Acetonide Scalp (DERMA-SMOOTHE/FS SCALP) 0.01 % OIL oil Apply topically once a week Use once a week. Apply 1-2 capfuls to dry scalp, massage in and leave on overnight, wash out in the morning 1 Bottle 3     fluocinonide (LIDEX) 0.05 % solution Use to treat any areas on the scalp that are itchy as needed. Use twice daily to any areas of complete hair loss. 60 mL 3     ketoconazole (NIZORAL) 2 % shampoo Apply to scalp, lather, then rinse, do every other day alternating with Head & Shoulders 120 mL 5     levothyroxine (SYNTHROID, LEVOTHROID) 112 MCG tablet Take by mouth daily       montelukast (SINGULAIR) 10 MG tablet Take 10 mg by mouth At Bedtime       omalizumab (XOLAIR) 150 MG injection  Inject 300 mg Subcutaneous       RANITIDINE HCL PO Take 150 mg by mouth 2 times daily       tacrolimus (PROTOPIC) 0.1 % ointment Apply topically 2 times daily 60 g 1     triamcinolone acetonide (KENALOG) 10 MG/ML injection See med note (Patient not taking: Reported on 10/17/2018) 5 mL 0     Allergies   Allergen Reactions     Acetaminophen Hives     Penicillins Hives     Shellfish Allergy GI Disturbance     Silver Nitrate Hives     tegaderm adhesive dressing      Sulfa Drugs Hives     Vicodin [Hydrocodone-Acetaminophen] Hives       Review of Systems:  - Constitutional: The patient is feeling generally well   - Skin: As above in HPI. No additional skin concerns.    Physical exam:  Vitals: There were no vitals taken for this visit.  GEN: This is a well developed, well-nourished female in no acute distress, in a pleasant mood.    SKIN: Focused examination of the face, scalp and hands was performed. Significant for:     - Regrowth noted just superior to the right ear, new area on the left lower occipital scalp and right lower occipital scalp    -Large patch of alopecia to occipital scalp 5 cm, with 2 adjacent 2 cm patches, regrowth noted throughout   -Scattered patches of alopecia throughout the scalp, mainly to the mid frontal, left and right parietal scalp have improved.   - Eyebrows within normal limits   - Negative hair pull test   - Minimal scale to the scalp.   - No other lesions of concern on areas examined.    Impression/Plan:  1. Alopecia areata- waxing and waning. Hx of seborrheic dermatitis (well controlled)    Continue clobetasol 0.05% shampoo 1x per week. Refill provided.    Continue ketoconazole 1-2x per week.     Continue rogaine 5% foam 3x per week.    Continue using dermasmoothe/FS 0.01% oil once per week. Refill provided.     Continue fluocinonide 0.05% solution, apply to areas of the scalp that are itchy as needed. Use BID to any areas of complete hair loss.   Kenalog intralesional injection procedure  note: After verbal consent and discussion of risks including but not limited to atrophy, pain, and bruising, time out was performed, the patient underwent positioning, 4.0  total cc of Kenalog 10 mg/cc was injected in 40 sites on the scalp. The patient tolerated the procedure well and left the Dermatology clinic in good condition.   Pt would like to follow up with Dr. Brown to discuss alternative options for topical and/or systemic treatments.     Photodocumentation taken for future reference.    Follow-up in 12 weeks with Dr. Brown, earlier for new or changing lesions.     Staff involved:  Scribe/Staff    Scribe Disclosure:   GILDARDO, Monik Ortiz, am serving as a scribe to document services personally performed by Alyce Logan PA-C, based on data collection and the provider's statements to me.    Provider Disclosure:   The documentation recorded by the scribe accurately reflects the services I personally performed and the decisions made by me.    All risks, benefits and alternatives were discussed with patient.  Patient is in agreement and understands the assessment and plan.  All questions were answered.  Sun Screen Education was given.   Return to Clinic in 3 months or sooner as needed.   Alyce Logan PA-C   [facility]

## 2018-10-17 NOTE — MR AVS SNAPSHOT
After Visit Summary   10/17/2018    Maria C Goodman    MRN: [id number]           Patient Information     Date Of Birth          1955        Visit Information        Provider Department      10/17/2018 9:45 AM Alyce Logan PA-C [facility]        Today's Diagnoses     Alopecia areata        Dermatitis        AA (alopecia areata)           Follow-ups after your visit        Your next 10 appointments already scheduled     Jan 08, 2019  1:20 PM CST   (Arrive by 1:05 PM)   Return Visit with Nita Brown MD   [facility] ([facility])    [address] [place] [id number] [phone]              Who to contact     Please call your clinic at [phone] to:    Ask questions about your health    Make or cancel appointments    Discuss your medicines    Learn about your test results    Speak to your doctor            Additional Information About Your Visit        MyChart Information     [employer] gives you secure access to your electronic health record. If you see a primary care provider, you can also send messages to your care team and make appointments. If you have questions, please call your primary care clinic.  If you do not have a primary care provider, please call [phone] and they will assist you.      [employer] is an electronic gateway that provides easy, online access to your medical records. With [employer], you can request a clinic appointment, read your test results, renew a prescription or communicate with your care team.     To access your existing account, please contact your [facility] or call [phone] for assistance.        Care EveryWhere ID     This is your Care EveryWhere ID. This could be used by other organizations to access your [facility] medical records  [id number]         Blood Pressure from Last 3 Encounters:   03/19/18 116/74   01/22/18 120/65   11/13/17  117/70    Weight from Last 3 Encounters:   01/22/18 72.6 kg (160 lb)   04/04/16 73.9 kg (163 lb)   12/14/15 73.9 kg (163 lb)              Today, you had the following     No orders found for display         Where to get your medicines      These medications were sent to [id number] - [place], [place] - [address] AT [facility]  [address], [place] [address]     Phone:  [phone]     clobetasol propionate 0.05 % Sham    Fluocinolone Acetonide Scalp 0.01 % Oil oil    fluocinonide 0.05 % solution          Primary Care Provider Office Phone # Fax #    Damaris Mckeon [phone]       [address] [address]        Equal Access to Services     SAUL LACY : Hadii gay gutierrez Sohéctor, waaxda luqadaha, qaybta kaalmada bradleyyamónica, thierry carlson . So [facility] [phone].    ATENCIÓN: Si habla español, tiene a barboza disposición servicios gratuitos de asistencia lingüística. Lonnie al [phone].    We comply with applicable federal civil rights laws and [place] laws. We do not discriminate on the basis of race, color, national origin, age, disability, sex, sexual orientation, or gender identity.            Thank you!     Thank you for choosing [facility]  for your care. Our goal is always to provide you with excellent care. Hearing back from our patients is one way we can continue to improve our services. Please take a few minutes to complete the written survey that you may receive in the mail after your visit with us. Thank you!             Your Updated Medication List - Protect others around you: Learn how to safely use, store and throw away your medicines at www.disposemymeds.org.          This list is accurate as of 10/17/18 10:33 AM.  Always use your most recent med list.                   Brand Name Dispense Instructions for use Diagnosis    ALBUTEROL IN      Inhale into the lungs daily as needed         CALCIUM + D PO      Take by mouth daily        cetirizine 10 MG tablet    zyrTEC     Take 10 mg by mouth daily        * clobetasol 0.05 % ointment    TEMOVATE    60 g    Apply a fine layer to the scalp at night after application of Rogaine, shampoo in am. A tube should last 2 months.    Autoimmune disease (H), Alopecia areata       * clobetasol 0.05 % cream    TEMOVATE    60 g    Apply a fine layer to scalp in the morning after shampooing, a tube should last 2 months    Autoimmune disease (H), Alopecia areata       * clobetasol propionate 0.05 % Sham     118 mL    APPLY TOPICALLY TO A DRY SCALP, LEAVE ON FOR 10-15 MINUTES, THEN LATHER AND RINSE. DO EVERY OTHER DAY    Alopecia areata, Dermatitis       escitalopram 20 MG tablet    LEXAPRO     Take one tablet by mouth once daily with 10 mg tablet for total dose 30 mg.        EXEMESTANE PO      Take by mouth daily        ferrous gluconate 324 (38 Fe) MG tablet    FERGON    60 tablet    One tablet daily    Low iron       Fluocinolone Acetonide Scalp 0.01 % Oil oil    DERMA-SMOOTHE/FS SCALP    1 Bottle    Apply topically once a week Use once a week. Apply 1-2 capfuls to dry scalp, massage in and leave on overnight, wash out in the morning    AA (alopecia areata)       fluocinonide 0.05 % solution    LIDEX    60 mL    Use to treat any areas on the scalp that are itchy as needed. Use twice daily to any areas of complete hair loss.    AA (alopecia areata)       ketoconazole 2 % shampoo    NIZORAL    120 mL    Apply to scalp, lather, then rinse, do every other day alternating with Head & Shoulders    Dermatitis, seborrheic       levothyroxine 112 MCG tablet    SYNTHROID/LEVOTHROID     Take by mouth daily        omalizumab 150 MG injection    XOLAIR     Inject 300 mg Subcutaneous        RANITIDINE HCL PO      Take 150 mg by mouth 2 times daily        SINGULAIR 10 MG tablet   Generic drug:  montelukast      Take 10 mg by mouth At Bedtime        tacrolimus 0.1 % ointment     PROTOPIC    60 g    Apply topically 2 times daily    Dermatitis       triamcinolone acetonide 10 MG/ML injection    KENALOG    5 mL    See med note    AA (alopecia areata)       vitamin D 1000 units capsule      Take 5,000 Units by mouth daily        * Notice:  This list has 3 medication(s) that are the same as other medications prescribed for you. Read the directions carefully, and ask your doctor or other care provider to review them with you.

## 2018-10-17 NOTE — NURSING NOTE
Chief Complaint   Patient presents with     Hair Loss     Maria C is here for a follow up hairloss     Pete Rojas, LPN

## 2019-01-08 ENCOUNTER — OFFICE VISIT (OUTPATIENT)
Dept: DERMATOLOGY | Facility: CLINIC | Age: 64
End: 2019-01-08
Payer: COMMERCIAL

## 2019-01-08 VITALS — HEART RATE: 73 BPM | DIASTOLIC BLOOD PRESSURE: 71 MMHG | SYSTOLIC BLOOD PRESSURE: 116 MMHG

## 2019-01-08 DIAGNOSIS — L21.9 DERMATITIS, SEBORRHEIC: ICD-10-CM

## 2019-01-08 DIAGNOSIS — L63.9 ALOPECIA AREATA: Primary | ICD-10-CM

## 2019-01-08 DIAGNOSIS — L30.9 DERMATITIS: ICD-10-CM

## 2019-01-08 ASSESSMENT — PAIN SCALES - GENERAL: PAINLEVEL: NO PAIN (0)

## 2019-01-08 NOTE — NURSING NOTE
"                            Dermatology Rooming Note    Maria C Goodman's goals for this visit include:   Chief Complaint   Patient presents with     Hair Loss     Maria C is here today for a 12 week hair loss follow up. Maria C notes\" Im not seeing much improvement.\"      Susan Newsome, AMANDA    "

## 2019-01-08 NOTE — PROGRESS NOTES
"[facility] Dermatology Note      Dermatology Problem List:  1. Alopecia Areata,   -s/p ILK  10 today  -Current Tx: Rogaine, ketoconazole, clobetasol 0.05% shampoo, dermasmoothe FS oil, fluocinonide solution  -Will prescribe topical tofacitnib, with plan to have patient hold use until cleared by heme/onc   2. Hand Dermatitis  3. Urticaria, cold aggravated/induced  -Current Tx: Xolair  4. Solar lentigines   5. Seborrheic dermatitis, resolved  -tretinoin 0.1% cream     Encounter Date: Jan 8, 2019    CC:   Chief Complaint   Patient presents with     Hair Loss     Maria C is here today for a 12 week hair loss follow up. Maria C notes\" Im not seeing much improvement.\"          History of Present Illness:    Ms. Maria C Goodman is a 63 year old female who presents as a follow-up for alopecia areata. The patient was last seen in the dermatology clinic on 10/17/18 during which 4.0 ml of ILK-10 were injected into 40 sites on the scalp for her alopecia areata. Pt states since last visit she has been doing alright. She has not noticed much improvement in scalp hair overall. She has been intermittently using her topicals since last visit. She is using ketoconazole about 2x weekly, dermasmooth FS 1x weekly, rogaine 1x weekly, fluocinonide solution 2x weekly. She is having a difficult time assessing her hair response overall. She does not notice any new areas of hair loss. She has not noticed any problems with eyebrows, eyelashes, or other areas.   Otherwise the patient reports no additional painful, bleeding, nonhealing or pruritic lesions and denies any new or changing moles.    Past Medical History:   Patient Active Problem List   Diagnosis     Alopecia areata     Dermatitis     Hypertrichosis     Urticaria     Autoimmune disease (H)     Dermatitis, seborrheic     Past Medical History:   Diagnosis Date     Asthma      Hypothyroidism      Lobular breast cancer (H)     s/p chemotherapy and radiation, in remission     " Multiple allergies      History reviewed. No pertinent surgical history.    Social History:  Patient reports that  has never smoked. she has never used smokeless tobacco.    Family History:  Family History   Problem Relation Age of Onset     Rheumatoid Arthritis Mother      Cancer No family hx of         skin cancer     Skin Cancer No family hx of         no skin cancer       Medications:  Current Outpatient Medications   Medication Sig Dispense Refill     ALBUTEROL IN Inhale into the lungs daily as needed       Calcium Carbonate-Vitamin D (CALCIUM + D PO) Take by mouth daily       cetirizine (ZYRTEC) 10 MG tablet Take 10 mg by mouth daily       Cholecalciferol (VITAMIN D) 1000 UNITS capsule Take 5,000 Units by mouth daily       clobetasol (TEMOVATE) 0.05 % cream Apply a fine layer to scalp in the morning after shampooing, a tube should last 2 months 60 g 1     clobetasol (TEMOVATE) 0.05 % ointment Apply a fine layer to the scalp at night after application of Rogaine, shampoo in am. A tube should last 2 months. 60 g 1     clobetasol propionate 0.05 % SHAM APPLY TOPICALLY TO A DRY SCALP, LEAVE ON FOR 10-15 MINUTES, THEN LATHER AND RINSE. DO EVERY OTHER  mL 11     escitalopram (LEXAPRO) 20 MG tablet Take one tablet by mouth once daily with 10 mg tablet for total dose 30 mg.       EXEMESTANE PO Take by mouth daily       Fluocinolone Acetonide Scalp (DERMA-SMOOTHE/FS SCALP) 0.01 % OIL oil Apply topically once a week Use once a week. Apply 1-2 capfuls to dry scalp, massage in and leave on overnight, wash out in the morning 1 Bottle 11     fluocinonide (LIDEX) 0.05 % solution Use to treat any areas on the scalp that are itchy as needed. Use twice daily to any areas of complete hair loss. 60 mL 11     ketoconazole (NIZORAL) 2 % shampoo Apply to scalp, lather, then rinse, do every other day alternating with Head & Shoulders 120 mL 5     levothyroxine (SYNTHROID, LEVOTHROID) 112 MCG tablet Take by mouth daily        montelukast (SINGULAIR) 10 MG tablet Take 10 mg by mouth At Bedtime       RANITIDINE HCL PO Take 150 mg by mouth 2 times daily       tacrolimus (PROTOPIC) 0.1 % ointment Apply topically 2 times daily 60 g 1     ferrous gluconate (FERGON) 324 (38 FE) MG tablet One tablet daily (Patient not taking: Reported on 10/17/2018) 60 tablet 1     omalizumab (XOLAIR) 150 MG injection Inject 300 mg Subcutaneous       triamcinolone acetonide (KENALOG) 10 MG/ML injection See med note (Patient not taking: Reported on 10/17/2018) 5 mL 0        Allergies   Allergen Reactions     Acetaminophen Hives     Penicillins Hives     Shellfish Allergy GI Disturbance     Silver Nitrate Hives     tegaderm adhesive dressing      Sulfa Drugs Hives     Vicodin [Hydrocodone-Acetaminophen] Hives         Review of Systems:  -As per HPI  -Constitutional: Otherwise feeling well today, in usual state of health.  -HEENT: Patient denies nonhealing oral sores.  -Skin: As above in HPI. No additional skin concerns.    Physical exam:  GEN: This is a well developed, well-nourished female in no acute distress, in a pleasant mood.    SKIN: Focused examination of the scalp, face and hands was performed.  -The eyebrows are preserved, eyelashes are preserved, and nails are within normal limits. No pitting or onycholysis.   -Diffuse thinning of scalp hair globally   -Several patches of alopecia diffusely throughout scalp, however most  areas with robust regrowth  -Areas of good regrowth noted within the ophiasis region, though increased thinning of occipital, frontal scalp    -Large patch of alopecia to occipital scalp 5 cm, with 2 adjacent 2 cm patches, regrowth noted throughout   - Negative hair pull test   -Ecematous appearing thin pink plaque on left shoulder  - No other lesions of concern on areas examined.      Impression/Plan:    1. Alopecia areata- waxing and waning. Hx of seborrheic dermatitis (well controlled)  Overall, improvement in some areas and  worsening in others today. Discussed with patient recent advancements involving FERNANDA inhibitors - both topical and systemic. After discussion of options, it was decided that topical tofacitinib could be efficacious for the patient. Sent prescription for tofacitinib 2% cream with understanding patient will not start this until cleared by heme/onc.    Continue clobetasol 0.05% shampoo 1x per week.    Continue ketoconazole 1-2x per week.     Continue rogaine 5% foam 3x per week.    Continue using dermasmoothe/FS 0.01% oil once per week    Continue fluocinonide 0.05% solution, apply to areas of the scalp that are itchy as needed. Use BID to any areas of complete hair loss.     Kenalog intralesional injection procedure note: After verbal consent and discussion of risks including but not limited to atrophy, pain, and bruising, time out was performed, the patient underwent positioning, 4.0  total cc of Kenalog 10 mg/cc was injected in 40 sites on the scalp. The patient tolerated the procedure well and left the Dermatology clinic in good condition.     Photodocumentation taken for future reference.    Patient to discuss with heme/onc physician possibility of oral or topical FERNANDA inhibitor use    Prescription for 2% tofacitinib cream BID sent to specialty pharmacy     2.  Eczematous dermatitis  Fairly non-specific. Present ~1 month on left upper back. Will treat empirically as below with plan for pt to alert clinic if not improving.    Clobetasol ointmetn BID PRN to affected area        3. Presented [facility] sponsored study on alopecia areta and  Behavioral health; Ms. Goodman chose to participate in this study today.    CC Referred MD Bert  No address on file on close of this encounter.    Follow-up in 6 weeks with Desmond and 12 weeks with Dr. Brown, earlier for new or changing lesions.       Dr. Brown staffed the patient.    Staff Involved:  Resident(Lisandro Cheema)/Staff    Patient was seen and examined  with the medicine/dermatology resident. I agree with the history, review of systems, physical examination, assessments and plan. ILK injections were done together.    Nita Brown MD  Professor and  Chair  Department of Dermatology  [facility]

## 2019-01-08 NOTE — LETTER
"1/8/2019       RE: Maria C Goodman  [address] [place] [address]     Dear Colleague,    Thank you for referring your patient, Maria C Goodman, to the [facility] DERMATOLOGY at [facility]. Please see a copy of my visit note below.        [facility] Dermatology Note      Dermatology Problem List:  1. Alopecia Areata,   -s/p ILK  10  today  -Current Tx: Rogaine, ketoconazole, clobetasol 0.05% shampoo, dermasmoothe FS oil, fluocinonide solution  -Will prescribe topical tofacitnib, with plan to have patient hold use until cleared by heme/onc   2. Hand Dermatitis  3. Urticaria, cold aggravated/induced  -Current Tx: Xolair  4. Solar lentigines   5. Seborrheic dermatitis, resolved  -tretinoin 0.1% cream     Encounter Date: Jan 8, 2019    CC:   Chief Complaint   Patient presents with     Hair Loss     Maria C is here today for a 12 week hair loss follow up. Maria C notes\" Im not seeing much improvement.\"          History of Present Illness:    Ms. Maria C Goodman is a 63 year old female who presents as a follow-up for alopecia areata. The patient was last seen in the dermatology clinic on  10/17/18 during which 4.0 ml of ILK-10 were injected into 40 sites on the scalp for her alopecia areata. Pt states since last visit she has been doing alright. She has not noticed much improvement in scalp hair overall. She has been intermittently using her topicals since last visit. She is using ketoconazole about 2x weekly, dermasmooth FS 1x weekly, rogaine 1x weekly, fluocinonide solution 2x weekly. She is having a difficult time assessing her hair response overall. She does not notice any new areas of hair loss. She has not noticed any problems with eyebrows, eyelashes, or other areas.   Otherwise the patient reports no additional painful, bleeding, nonhealing or pruritic lesions and denies any new or changing moles.    Past Medical History:   Patient Active Problem List "   Diagnosis     Alopecia areata     Dermatitis     Hypertrichosis     Urticaria     Autoimmune disease (H)     Dermatitis, seborrheic     Past Medical History:   Diagnosis Date     Asthma      Hypothyroidism      Lobular breast cancer (H)     s/p chemotherapy and radiation, in remission     Multiple allergies      History reviewed. No pertinent surgical history.    Social History:  Patient reports that  has never smoked. she has never used smokeless tobacco.    Family History:  Family History   Problem Relation Age of Onset     Rheumatoid Arthritis Mother      Cancer No family hx of         skin cancer     Skin Cancer No family hx of         no skin cancer       Medications:  Current Outpatient Medications   Medication Sig Dispense Refill     ALBUTEROL IN Inhale into the lungs daily as needed       Calcium Carbonate-Vitamin D (CALCIUM + D PO) Take by mouth daily       cetirizine (ZYRTEC) 10 MG tablet Take 10 mg by mouth daily       Cholecalciferol (VITAMIN D) 1000 UNITS capsule Take 5,000 Units by mouth daily       clobetasol (TEMOVATE) 0.05 % cream Apply a fine layer to scalp in the morning after shampooing, a tube should last 2 months 60 g 1     clobetasol (TEMOVATE) 0.05 % ointment Apply a fine layer to the scalp at night after application of Rogaine, shampoo in am. A tube should last 2 months. 60 g 1     clobetasol propionate 0.05 % SHAM APPLY TOPICALLY TO A DRY SCALP, LEAVE ON FOR 10-15 MINUTES, THEN LATHER AND RINSE. DO EVERY OTHER  mL 11     escitalopram (LEXAPRO) 20 MG tablet Take one tablet by mouth once daily with 10 mg tablet for total dose 30 mg.       EXEMESTANE PO Take by mouth daily       Fluocinolone Acetonide Scalp (DERMA-SMOOTHE/FS SCALP) 0.01 % OIL oil Apply topically once a week Use once a week. Apply 1-2 capfuls to dry scalp, massage in and leave on overnight, wash out in the morning 1 Bottle 11     fluocinonide (LIDEX) 0.05 % solution Use to treat any areas on the scalp that are itchy as  needed. Use twice daily to any areas of complete hair loss. 60 mL 11     ketoconazole (NIZORAL) 2 % shampoo Apply to scalp, lather, then rinse, do every other day alternating with Head & Shoulders 120 mL 5     levothyroxine (SYNTHROID, LEVOTHROID) 112 MCG tablet Take by mouth daily       montelukast (SINGULAIR) 10 MG tablet Take 10 mg by mouth At Bedtime       RANITIDINE HCL PO Take 150 mg by mouth 2 times daily       tacrolimus (PROTOPIC) 0.1 % ointment Apply topically 2 times daily 60 g 1     ferrous gluconate (FERGON) 324 (38 FE) MG tablet One tablet daily (Patient not taking: Reported on 10/17/2018) 60 tablet 1     omalizumab (XOLAIR) 150 MG injection Inject 300 mg Subcutaneous       triamcinolone acetonide (KENALOG) 10 MG/ML injection See med note (Patient not taking: Reported on 10/17/2018) 5 mL 0        Allergies   Allergen Reactions     Acetaminophen Hives     Penicillins Hives     Shellfish Allergy GI Disturbance     Silver Nitrate Hives     tegaderm adhesive dressing      Sulfa Drugs Hives     Vicodin [Hydrocodone-Acetaminophen] Hives         Review of Systems:  -As per HPI  -Constitutional: Otherwise feeling well today, in usual state of health.  -HEENT: Patient denies nonhealing oral sores.  -Skin: As above in HPI. No additional skin concerns.    Physical exam:  GEN: This is a well developed, well-nourished female in no acute distress, in a pleasant mood.    SKIN: Focused examination of the scalp, face and hands was performed.  -The eyebrows are preserved, eyelashes are preserved, and nails are within normal limits. No pitting or onycholysis.   -Diffuse thinning of scalp hair globally   -Several patches of alopecia diffusely throughout scalp, however most  areas with robust regrowth  -Areas of good regrowth noted within the ophiasis region, though increased thinning of occipital, frontal scalp    -Large patch of alopecia to occipital scalp 5 cm, with 2 adjacent 2 cm patches, regrowth noted throughout   -  Negative hair pull test   -Ecematous appearing thin pink plaque on left shoulder  - No other lesions of concern on areas examined.      Impression/Plan:    1. Alopecia areata- waxing and waning. Hx of seborrheic dermatitis (well controlled)  Overall, improvement in some areas and worsening in others today. Discussed with patient recent advancements involving FERNANDA inhibitors - both topical and systemic. After discussion of options, it was decided that topical tofacitinib could be efficacious for the patient. Sent prescription for tofacitinib 2% cream with understanding patient will not start this until cleared by heme/onc.    Continue clobetasol 0.05% shampoo 1x per week.    Continue ketoconazole 1-2x per week.     Continue rogaine 5% foam 3x per week.    Continue using dermasmoothe/FS 0.01% oil once per week    Continue fluocinonide 0.05% solution, apply to areas of the scalp that are itchy as needed. Use BID to any areas of complete hair loss.     Kenalog intralesional injection procedure note: After verbal consent and discussion of risks including but not limited to atrophy, pain, and bruising, time out was performed, the patient underwent positioning, 4.0  total cc of Kenalog 10 mg/cc was injected in 40 sites on the scalp. The patient tolerated the procedure well and left the Dermatology clinic in good condition.     Photodocumentation taken for future reference.    Patient to discuss with heme/onc physician possibility of oral or topical FERNANDA inhibitor use    Prescription for 2% tofacitinib cream BID sent to specialty pharmacy     2.  Eczematous dermatitis  Fairly non-specific. Present ~1 month on left upper back. Will treat empirically as below with plan for pt to alert clinic if not improving.    Clobetasol ointmetn BID PRN to affected area        3. Presented [facility] sponsored study on alopecia areta and  Behavioral health; Ms. Goodman chose to participate in this study today.    CC Referred MD Bert  No address on  file on close of this encounter.    Follow-up in 6 weeks with Desmond and 12 weeks with Dr. Brown, earlier for new or changing lesions.       Dr. Brown staffed the patient.    Staff Involved:  Resident(Lisandro Cheema)/Staff    Patient was seen and examined with the medicine/dermatology resident. I agree with the history, review of systems, physical examination, assessments and plan. ILK injections were done together.    Nita Brown MD  Professor and  Chair  Department of Dermatology  [facility]      Drug Administration Record    Prior to injection, verified patient identity using patient's name and date of birth.  Due to injection administration, patient instructed to remain in clinic for 15 minutes  afterwards, and to report any adverse reaction to me immediately.    Drug Name: triamcinolone acetonide(kenalog)  Dose: 4mL of triamcinolone 10mg/mL, 40mg dose  Route administered: ID  NDC #: Kenalog-10 (3043-0165-62)  Amount of waste(mL):1mL  Reason for waste: Multi dose vial    LOT #: [id number]  SITE: SCALP  : [employer]  EXPIRATION DATE: 06/2020      [facility] Research Note  Amber Arroyo, PH.D., L.P.    One session held 1/8/19    Most pressing psychosocial stressors: frustration due to 1) inability to fully adhere to treatment protocol due to not planning for enough morning time and 2) not receiving desired emotional social support from others.    Summary of behavioral goals:  1) Decrease coffee consumption to increase sleep duration.    2) Ask desired others for time and space to listen to her discuss her AA.    Behavioral Health Recommendations:  Patient: follow through on her identified plans.    Provider: Inquire re adherence concerns; support her efforts for increasing receipt of emotional social support from others.   Again, thank you for allowing me to participate in the care of your patient.      Sincerely,    Nita Brown,  MD

## 2019-01-08 NOTE — PROGRESS NOTES
Drug Administration Record    Prior to injection, verified patient identity using patient's name and date of birth.  Due to injection administration, patient instructed to remain in clinic for 15 minutes  afterwards, and to report any adverse reaction to me immediately.    Drug Name: triamcinolone acetonide(kenalog)  Dose: 4mL of triamcinolone 10mg/mL, 40mg dose  Route administered: ID  NDC #: Kenalog-10 (0519-0803-44)  Amount of waste(mL):1mL  Reason for waste: Multi dose vial    LOT #: [id number]  SITE: [facility]  : [employer]  EXPIRATION DATE: 06/2020

## 2019-01-08 NOTE — PATIENT INSTRUCTIONS
For your hair loss, please use the following:  Use the topical steroid (clobetasol) on new spots daily and extend application 1 inch around the affected area.  Continue to apply Rogaine to areas of thinning 3x/week. Use up to 1 bottle per month.  Continue to use the oil 1x per week  Continue to use fluocinonide solution as neded  Continue clobetasol shampoo 1-2x per week  Continue to use ketoconazole shampoo  We sent a prescription to the specialty pharmacy for the topical tofacitinib cream. If approved by your heme/onc doctor, please use twice daily to affected areas on scalp.    For your area of dermatitis on your shoulder: apply clobetasol twice daily to affected area for 2 weeks. Please alert our clinic if not improving.

## 2019-01-10 ENCOUNTER — MYC MEDICAL ADVICE (OUTPATIENT)
Dept: DERMATOLOGY | Facility: CLINIC | Age: 64
End: 2019-01-10

## 2019-01-10 NOTE — TELEPHONE ENCOUNTER
Spoke with patient regarding new onset rash. Saw Dr. Brown on 1/8/19 at which time she received ILK injections to the scalp for alopecia and clobetasol cream was increased from daily to BID for eczema on arm. Experienced a chill last night and developed redness of her face and arms this morning. She was worried that this might be related to ILK which she has tolerated well in the past. Has been only slightly itchy. Blanches with pressure. No new products. Has been on xolair for pressure/cold-induced urticaria which she last received 12/24/18. This current rash feels different from urticaria and eczema. Has not had difficulty breathing and otherwise feels well.     Advised that she can use OTC hydrocortisone on new rash to help with itching. Instructed to go to ED if she develops difficulty breathing.     Will schedule to be seen in clinic tomorrow to evaluate rash. Sent message to scheduling pool.

## 2019-01-11 ENCOUNTER — OFFICE VISIT (OUTPATIENT)
Dept: DERMATOLOGY | Facility: CLINIC | Age: 64
End: 2019-01-11
Payer: COMMERCIAL

## 2019-01-11 DIAGNOSIS — L50.9 URTICARIA: Primary | ICD-10-CM

## 2019-01-11 ASSESSMENT — PAIN SCALES - GENERAL: PAINLEVEL: NO PAIN (0)

## 2019-01-11 NOTE — PROGRESS NOTES
"[facility] Dermatology Note      Dermatology Problem List:  1. Alopecia Areata:  -s/p ILK  10  -Current Tx: Rogaine, ketoconazole, clobetasol 0.05% shampoo, dermasmoothe FS oil, fluocinonide solution  -Prescribed topical tofacitnib, with plan to have patient hold use until cleared by heme/onc   2. Hand Dermatitis  3. Urticaria, cold aggravated/induced  -Current Tx: Xolair  4. Solar lentigines   -tretinoin 0.1% cream     Encounter Date: Jan 11, 2019    CC:   Chief Complaint   Patient presents with     Derm Problem     Maria C is here today to be seen for a rash- Maria C states \"it's more hivy than rashy\".          History of Present Illness:  Ms. Maria C Goodman is a 63 year old female who presents for evaluation of rash. Pt was seen by Dr. Brown on 1/8/19 at which time she received ILK injections to the scalp for alopecia and clobetasol cream was increased from daily to BID for eczema on arm. On Wednesday night pt states she felt al ittle ill, but not very noticeable. She went to bed earlier because of these minor cold like symptoms. She then woke up Thursday morning with bright red rash on her cheeks and on her upper arms. These areas have been slightly itchy, though not particularly bad. No new products, such as detergents, soaps, etc. Has been on xolair for pressure/cold-induced urticaria which she last received 12/24/18. She receives these injection once monthly. This current rash feels different from urticaria and eczema. Has not had difficulty breathing and otherwise feels well. She is primarily worried that this could be related to her ILK injections.    Pt otherwise feels well without any changes in general state of health. Denies any new, growing, changing, bleeding, or otherwise concerning/symptomatic skin findings. No other questions or concerns today.       Past Medical History:   Patient Active Problem List   Diagnosis     Alopecia areata     Dermatitis     Hypertrichosis     Urticaria     " Autoimmune disease (H)     Dermatitis, seborrheic     Past Medical History:   Diagnosis Date     Asthma      Hypothyroidism      Lobular breast cancer (H)     s/p chemotherapy and radiation, in remission     Multiple allergies      No past surgical history on file.    Social History:  Patient reports that  has never smoked. she has never used smokeless tobacco.    Family History:  Family History   Problem Relation Age of Onset     Rheumatoid Arthritis Mother      Cancer No family hx of         skin cancer     Skin Cancer No family hx of         no skin cancer       Medications:  Current Outpatient Medications   Medication Sig Dispense Refill     ALBUTEROL IN Inhale into the lungs daily as needed       Calcium Carbonate-Vitamin D (CALCIUM + D PO) Take by mouth daily       cetirizine (ZYRTEC) 10 MG tablet Take 10 mg by mouth daily       Cholecalciferol (VITAMIN D) 1000 UNITS capsule Take 5,000 Units by mouth daily       clobetasol (TEMOVATE) 0.05 % cream Apply a fine layer to scalp in the morning after shampooing, a tube should last 2 months 60 g 1     clobetasol (TEMOVATE) 0.05 % ointment Apply a fine layer to the scalp at night after application of Rogaine, shampoo in am. A tube should last 2 months. 60 g 1     clobetasol propionate 0.05 % SHAM APPLY TOPICALLY TO A DRY SCALP, LEAVE ON FOR 10-15 MINUTES, THEN LATHER AND RINSE. DO EVERY OTHER  mL 11     escitalopram (LEXAPRO) 20 MG tablet Take one tablet by mouth once daily with 10 mg tablet for total dose 30 mg.       EXEMESTANE PO Take by mouth daily       ferrous gluconate (FERGON) 324 (38 FE) MG tablet One tablet daily (Patient not taking: Reported on 10/17/2018) 60 tablet 1     Fluocinolone Acetonide Scalp (DERMA-SMOOTHE/FS SCALP) 0.01 % OIL oil Apply topically once a week Use once a week. Apply 1-2 capfuls to dry scalp, massage in and leave on overnight, wash out in the morning 1 Bottle 11     fluocinonide (LIDEX) 0.05 % solution Use to treat any areas on  the scalp that are itchy as needed. Use twice daily to any areas of complete hair loss. 60 mL 11     ketoconazole (NIZORAL) 2 % shampoo Apply to scalp, lather, then rinse, do every other day alternating with Head & Shoulders 120 mL 5     levothyroxine (SYNTHROID, LEVOTHROID) 112 MCG tablet Take by mouth daily       montelukast (SINGULAIR) 10 MG tablet Take 10 mg by mouth At Bedtime       omalizumab (XOLAIR) 150 MG injection Inject 300 mg Subcutaneous       RANITIDINE HCL PO Take 150 mg by mouth 2 times daily       tacrolimus (PROTOPIC) 0.1 % ointment Apply topically 2 times daily 60 g 1     triamcinolone acetonide (KENALOG) 10 MG/ML injection See med note (Patient not taking: Reported on 10/17/2018) 5 mL 0        Allergies   Allergen Reactions     Acetaminophen Hives     Penicillins Hives     Shellfish Allergy GI Disturbance     Silver Nitrate Hives     tegaderm adhesive dressing      Sulfa Drugs Hives     Vicodin [Hydrocodone-Acetaminophen] Hives         Review of Systems:  -Constitutional:  Feeling well, in usual state of health.  -Skin:  As per HPI, no additional concerns.    Physical exam:  Vitals: There were no vitals taken for this visit.  GEN: This is a well developed, well-nourished female in no acute distress, in a pleasant mood.    SKIN: Focused examination of the face, arms, and chest was performed.  - face is clear today  - mildly pink, erythematous patches of b/l lateral upper arms without scale. These crystal with pressure. Patient does have diffuse xerosis of the skin.  - patient photographs reviewed, most consistent with urticaria  - no other lesions of concern on areas examined.     Impression/Plan:    1. Non- specific urticarial appearing eruption of face and arms:  Overall, pictures of eruption appear urticarial in nature. Pt showed picture with bright red, erythematous eruption on b/l cheeks w/o e/o scale or other epidermal changes. Discussed with patient that there are a number of etiologies that  can drive this, including viral mediated illnesses (suspect this given cold symptoms proceeded eruption). Also discussed this could be related to her underlying pressure/cold induced urticaria (do not favor this as patient cannot recall cold exposure immediately preceding eruption). Discussed this was likely not relaetd to her recent kenalog injections as eruption developed 24-48 hours after injection. Reassurance given. Will alert Dr. Brown to patient's visit.    Follow-up prn for new or changing lesions.       Dr. Sorto staffed the patient.    Staff Involved:  Resident(Lisandro Cheema)/Staff    Staff Physician Comments:   I saw and evaluated the patient with the resident and I agree with the assessment and plan. I was present for the examination.    Re Sorto MD    Department of Dermatology  [facility]: Phone: [phone], Fax:[phone]  [facility]: Phone: [phone], Fax: [phone]

## 2019-01-11 NOTE — NURSING NOTE
"Dermatology Rooming Note    Maria C Goodman's goals for this visit include:   Chief Complaint   Patient presents with     Derm Problem     Maria C is here today to be seen for a rash- Maria C states \"it's more hivy than rashy\".      Jaymie Zaragoza, RMA     "

## 2019-01-11 NOTE — PROGRESS NOTES
[facility] Research Note  Amber Arroyo, PH.D., L.P.    One session held 1/8/19    Most pressing psychosocial stressors: frustration due to 1) inability to fully adhere to treatment protocol due to not planning for enough morning time and 2) not receiving desired emotional social support from others.    Summary of behavioral goals:  1) Decrease coffee consumption to increase sleep duration.    2) Ask desired others for time and space to listen to her discuss her AA.    Behavioral Health Recommendations:  Patient: follow through on her identified plans.    Provider: Inquire re adherence concerns; support her efforts for increasing receipt of emotional social support from others.

## 2019-01-11 NOTE — LETTER
"1/11/2019       RE: Maria C Goodman  [address] [place] [address]     Dear Colleague,    Thank you for referring your patient, Maria C Goodman, to the [facility] DERMATOLOGY at [facility]. Please see a copy of my visit note below.        [facility] Dermatology Note      Dermatology Problem List:  1. Alopecia Areata:  -s/p ILK  10  -Current Tx: Rogaine, ketoconazole, clobetasol 0.05% shampoo, dermasmoothe FS oil, fluocinonide solution  -Prescribed topical tofacitnib, with plan to have patient hold use until cleared by heme/onc   2. Hand Dermatitis  3. Urticaria, cold aggravated/induced  -Current Tx: Xolair  4. Solar lentigines   -tretinoin 0.1% cream     Encounter Date: Jan 11, 2019    CC:   Chief Complaint   Patient presents with     Derm Problem     Maria C is here today to be seen for a rash- Maria C states \"it's more hivy than rashy\".          History of Present Illness:  Ms. Maria C Goodman is a 63 year old female who presents for evaluation of rash. Pt was seen by Dr. Brown on 1/8/19 at which time she received ILK injections to the scalp for alopecia and clobetasol cream was increased from daily to BID for eczema on arm. On Wednesday night pt states she felt al ittle ill, but not very noticeable. She went to bed earlier because of these minor cold like symptoms. She then woke up Thursday morning with bright red rash on her cheeks and on her upper arms. These areas have been slightly itchy, though not particularly bad. No new products, such as detergents, soaps, etc. Has been on xolair for pressure/cold-induced urticaria which she last received 12/24/18. She receives these injection once monthly. This current rash feels different from urticaria and eczema. Has not had difficulty breathing and otherwise feels well. She is primarily worried that this could be related to her ILK injections.    Pt otherwise feels well without any changes in general state of " health. Denies any new, growing, changing, bleeding, or otherwise concerning/symptomatic skin findings. No other questions or concerns today.       Past Medical History:   Patient Active Problem List   Diagnosis     Alopecia areata     Dermatitis     Hypertrichosis     Urticaria     Autoimmune disease (H)     Dermatitis, seborrheic     Past Medical History:   Diagnosis Date     Asthma      Hypothyroidism      Lobular breast cancer (H)     s/p chemotherapy and radiation, in remission     Multiple allergies      No past surgical history on file.    Social History:  Patient reports that  has never smoked. she has never used smokeless tobacco.    Family History:  Family History   Problem Relation Age of Onset     Rheumatoid Arthritis Mother      Cancer No family hx of         skin cancer     Skin Cancer No family hx of         no skin cancer       Medications:  Current Outpatient Medications   Medication Sig Dispense Refill     ALBUTEROL IN Inhale into the lungs daily as needed       Calcium Carbonate-Vitamin D (CALCIUM + D PO) Take by mouth daily       cetirizine (ZYRTEC) 10 MG tablet Take 10 mg by mouth daily       Cholecalciferol (VITAMIN D) 1000 UNITS capsule Take 5,000 Units by mouth daily       clobetasol (TEMOVATE) 0.05 % cream Apply a fine layer to scalp in the morning after shampooing, a tube should last 2 months 60 g 1     clobetasol (TEMOVATE) 0.05 % ointment Apply a fine layer to the scalp at night after application of Rogaine, shampoo in am. A tube should last 2 months. 60 g 1     clobetasol propionate 0.05 % SHAM APPLY TOPICALLY TO A DRY SCALP, LEAVE ON FOR 10-15 MINUTES, THEN LATHER AND RINSE. DO EVERY OTHER  mL 11     escitalopram (LEXAPRO) 20 MG tablet Take one tablet by mouth once daily with 10 mg tablet for total dose 30 mg.       EXEMESTANE PO Take by mouth daily       ferrous gluconate (FERGON) 324 (38 FE) MG tablet One tablet daily (Patient not taking: Reported on 10/17/2018) 60 tablet 1      Fluocinolone Acetonide Scalp (DERMA-SMOOTHE/FS SCALP) 0.01 % OIL oil Apply topically once a week Use once a week. Apply 1-2 capfuls to dry scalp, massage in and leave on overnight, wash out in the morning 1 Bottle 11     fluocinonide (LIDEX) 0.05 % solution Use to treat any areas on the scalp that are itchy as needed. Use twice daily to any areas of complete hair loss. 60 mL 11     ketoconazole (NIZORAL) 2 % shampoo Apply to scalp, lather, then rinse, do every other day alternating with Head & Shoulders 120 mL 5     levothyroxine (SYNTHROID, LEVOTHROID) 112 MCG tablet Take by mouth daily       montelukast (SINGULAIR) 10 MG tablet Take 10 mg by mouth At Bedtime       omalizumab (XOLAIR) 150 MG injection Inject 300 mg Subcutaneous       RANITIDINE HCL PO Take 150 mg by mouth 2 times daily       tacrolimus (PROTOPIC) 0.1 % ointment Apply topically 2 times daily 60 g 1     triamcinolone acetonide (KENALOG) 10 MG/ML injection See med note (Patient not taking: Reported on 10/17/2018) 5 mL 0        Allergies   Allergen Reactions     Acetaminophen Hives     Penicillins Hives     Shellfish Allergy GI Disturbance     Silver Nitrate Hives     tegaderm adhesive dressing      Sulfa Drugs Hives     Vicodin [Hydrocodone-Acetaminophen] Hives         Review of Systems:  -Constitutional:  Feeling well, in usual state of health.  -Skin:  As per HPI, no additional concerns.    Physical exam:  Vitals: There were no vitals taken for this visit.  GEN: This is a well developed, well-nourished female in no acute distress, in a pleasant mood.    SKIN: Focused examination of the face, arms, and chest was performed.  - face is clear today  - mildly pink, erythematous patches of b/l lateral upper arms without scale. These crystal with pressure. Patient does have diffuse xerosis of the skin.  - patient photographs reviewed, most consistent with urticaria  - no other lesions of concern on areas examined.     Impression/Plan:    1. Non- specific  urticarial appearing eruption of face and arms:  Overall, pictures of eruption appear urticarial in nature. Pt showed picture with bright red, erythematous eruption on b/l cheeks w/o e/o scale or other epidermal changes. Discussed with patient that there are a number of etiologies that can drive this, including viral mediated illnesses (suspect this given cold symptoms proceeded eruption). Also discussed this could be related to her underlying pressure/cold induced urticaria (do not favor this as patient cannot recall cold exposure immediately preceding eruption). Discussed this was likely not relaetd to her recent kenalog injections as eruption developed 24-48 hours after injection. Reassurance given. Will alert Dr. Brown to patient's visit.    Follow-up prn for new or changing lesions.       Dr. Sorto staffed the patient.    Staff Involved:  Resident(Lisandro Cheema)/Staff    Staff Physician Comments:   I saw and evaluated the patient with the resident and I agree with the assessment and plan. I was present for the examination.    Re Sorto MD    Department of Dermatology  [facility]: Phone: [phone], Fax:[phone]  [facility]: Phone: [phone], Fax: [phone]

## 2019-02-19 ENCOUNTER — OFFICE VISIT (OUTPATIENT)
Dept: DERMATOLOGY | Facility: CLINIC | Age: 64
End: 2019-02-19
Payer: COMMERCIAL

## 2019-02-19 DIAGNOSIS — L21.9 DERMATITIS, SEBORRHEIC: ICD-10-CM

## 2019-02-19 DIAGNOSIS — L63.9 ALOPECIA AREATA: Primary | ICD-10-CM

## 2019-02-19 DIAGNOSIS — L30.9 DERMATITIS: ICD-10-CM

## 2019-02-19 DIAGNOSIS — L63.9 AA (ALOPECIA AREATA): ICD-10-CM

## 2019-02-19 DIAGNOSIS — M35.9 AUTOIMMUNE DISEASE (H): ICD-10-CM

## 2019-02-19 RX ORDER — CLOBETASOL PROPIONATE 0.5 MG/G
CREAM TOPICAL
Qty: 60 G | Refills: 1 | Status: SHIPPED | OUTPATIENT
Start: 2019-02-19 | End: 2022-01-31

## 2019-02-19 RX ORDER — FLUOCINOLONE ACETONIDE 0.11 MG/ML
OIL TOPICAL WEEKLY
Qty: 1 BOTTLE | Refills: 11 | Status: SHIPPED | OUTPATIENT
Start: 2019-02-19 | End: 2020-06-30

## 2019-02-19 RX ORDER — FLUOCINONIDE TOPICAL SOLUTION USP, 0.05% 0.5 MG/ML
SOLUTION TOPICAL
Qty: 60 ML | Refills: 11 | Status: SHIPPED | OUTPATIENT
Start: 2019-02-19 | End: 2020-05-05

## 2019-02-19 RX ORDER — CLOBETASOL PROPIONATE 0.05 G/100ML
SHAMPOO TOPICAL
Qty: 118 ML | Refills: 11 | Status: SHIPPED | OUTPATIENT
Start: 2019-02-19 | End: 2020-02-27

## 2019-02-19 ASSESSMENT — PAIN SCALES - GENERAL: PAINLEVEL: NO PAIN (0)

## 2019-02-19 NOTE — PROGRESS NOTES
"[facility] Dermatology Note      Dermatology Problem List:  1. Alopecia Areata:  -s/p ILK  10  -Current Tx: Rogaine, ketoconazole, clobetasol 0.05% shampoo, dermasmoothe FS oil, fluocinonide solution  -Prescribed topical tofacitnib, with plan to have patient hold use until cleared by heme/onc   2. Hand Dermatitis  3. Urticaria, cold aggravated/induced  -Current Tx: Xolair  4. Solar lentigines   -tretinoin 0.1% cream     Encounter Date: Feb 19, 2019    CC:  Chief Complaint   Patient presents with     Derm Problem     Hairloss f/u, no changes noted.          History of Present Illness:  Ms. Maria C Goodman is a 63 year old female who presents as a follow-up for hairloss. The patient was seen on 01/08/2019 when 4.0  total cc of Kenalog 10 mg/cc was injected in 40 sites on the scalp. At today's visit the patient states after getting her kenalog injections she had a reaction on her face. She states that the reactions lasted 3 to 4 days, and went in to see doctor delroy on 01/11/2019. Regarding the patient scalp she thinks that her scalp is \"okay, its still itchy\". She continues to use her topical medication. She doesn't think that she is getting much growth on the back of the scalp. The patient would like to get more information on compounded topical xeljanz, she would like to know how it works, what it would cost, and side effects. She got approval from her oncologist to move forward with this medication. The patient denies painful, itching, tingling or bleeding lesions unless otherwise noted.        Past Medical History:   Patient Active Problem List   Diagnosis     Alopecia areata     Dermatitis     Hypertrichosis     Urticaria     Autoimmune disease (H)     Dermatitis, seborrheic     Past Medical History:   Diagnosis Date     Asthma      Hypothyroidism      Lobular breast cancer (H)     s/p chemotherapy and radiation, in remission     Multiple allergies      No past surgical history on " file.    Social History:   reports that  has never smoked. she has never used smokeless tobacco.    Family History:  Family History   Problem Relation Age of Onset     Rheumatoid Arthritis Mother      Cancer No family hx of         skin cancer     Skin Cancer No family hx of         no skin cancer       Medications:  Current Outpatient Medications   Medication Sig Dispense Refill     ALBUTEROL IN Inhale into the lungs daily as needed       Calcium Carbonate-Vitamin D (CALCIUM + D PO) Take by mouth daily       cetirizine (ZYRTEC) 10 MG tablet Take 10 mg by mouth daily       Cholecalciferol (VITAMIN D) 1000 UNITS capsule Take 5,000 Units by mouth daily       clobetasol (TEMOVATE) 0.05 % cream Apply a fine layer to scalp in the morning after shampooing, a tube should last 2 months 60 g 1     clobetasol (TEMOVATE) 0.05 % ointment Apply a fine layer to the scalp at night after application of Rogaine, shampoo in am. A tube should last 2 months. 60 g 1     clobetasol propionate 0.05 % SHAM APPLY TOPICALLY TO A DRY SCALP, LEAVE ON FOR 10-15 MINUTES, THEN LATHER AND RINSE. DO EVERY OTHER  mL 11     escitalopram (LEXAPRO) 20 MG tablet Take one tablet by mouth once daily with 10 mg tablet for total dose 30 mg.       EXEMESTANE PO Take by mouth daily       ferrous gluconate (FERGON) 324 (38 FE) MG tablet One tablet daily (Patient not taking: Reported on 10/17/2018) 60 tablet 1     Fluocinolone Acetonide Scalp (DERMA-SMOOTHE/FS SCALP) 0.01 % OIL oil Apply topically once a week Use once a week. Apply 1-2 capfuls to dry scalp, massage in and leave on overnight, wash out in the morning 1 Bottle 11     fluocinonide (LIDEX) 0.05 % solution Use to treat any areas on the scalp that are itchy as needed. Use twice daily to any areas of complete hair loss. 60 mL 11     ketoconazole (NIZORAL) 2 % shampoo Apply to scalp, lather, then rinse, do every other day alternating with Head & Shoulders 120 mL 5     levothyroxine (SYNTHROID,  LEVOTHROID) 112 MCG tablet Take by mouth daily       montelukast (SINGULAIR) 10 MG tablet Take 10 mg by mouth At Bedtime       omalizumab (XOLAIR) 150 MG injection Inject 300 mg Subcutaneous       RANITIDINE HCL PO Take 150 mg by mouth 2 times daily       tacrolimus (PROTOPIC) 0.1 % ointment Apply topically 2 times daily 60 g 1     triamcinolone acetonide (KENALOG) 10 MG/ML injection See med note (Patient not taking: Reported on 10/17/2018) 5 mL 0       Allergies   Allergen Reactions     Acetaminophen Hives     Penicillins Hives     Shellfish Allergy GI Disturbance     Silver Nitrate Hives     tegaderm adhesive dressing      Sulfa Drugs Hives     Vicodin [Hydrocodone-Acetaminophen] Hives       Review of Systems:  -Constitutional: The patient denies fatigue, fevers, chills, unintended weight loss, and night sweats.  -HEENT: Patient denies nonhealing oral sores.  -Skin: As above in HPI. No additional skin concerns.    Physical exam:  Vitals: There were no vitals taken for this visit.  GEN: This is a well developed, well-nourished female in no acute distress, in a pleasant mood.    SKIN: Focused examination of the face scalp and neck  was performed.  -The eyebrows are preserved, eyelashes are preserved, and nails are within normal limits. No pitting or onycholysis.   -Diffuse thinning of scalp hair globally   -Several patches of alopecia diffusely throughout scalp, however most  areas with robust regrowth  -Areas of good regrowth noted within the ophiasis region, though increased thinning of occipital, frontal scalp    -patchy alopecia maintly on the left frontal and left parietal scalp and ophiasis scalp   - Negative hair pull test   -No other lesions of concern on areas examined.       Impression/Plan:  1. Alopecia areata- waxing and waning. Hx of seborrheic dermatitis (well controlled)    Continue clobetasol 0.05% shampoo 1x per week. (refills given)    Continue ketoconazole 1-2x per week.     Continue rogaine 5%  foam 3x per week.    Continue using dermasmoothe/FS 0.01% oil once per week (refills given)    Continue clobetasol cream to new areas of hair loss bid (refills given)    Continue fluocinonide 0.05% solution, apply to areas of the scalp that are itchy as needed. Use BID to any areas of complete hair loss. (refills given)    Kenalog intralesional injection procedure note: After verbal consent and discussion of risks including but not limited to atrophy, pain, and bruising, time out was performed, the patient underwent positioning, 4.0  total cc of Kenalog 10 mg/cc was injected in 40 sites on the scalp. The patient tolerated the procedure well and left the Dermatology clinic in good condition.     Photodocumentation taken for future reference.    Discussed xeljanz treatment, side effects and cost. ÁNGEL Goodrich called in the rx tofacitinib 2% cream to be applied twice daily to the scalp. Additional resources (articles) will be mailed to the patient from nursing staff.    2. HX of Non- specific urticarial appearing eruption of face and arms:    Resolved will clinically monitor     CC Dr. Brown on close of this encounter.  Follow-up in 12 weeks, earlier for new or changing lesions.       Staff Involved:  Staff/Scribe    Staff Involved:    Scribe Disclosure  I, Brendon Larry, am serving as a scribe to document services personally performed by Alyce Logan PA-C, based on data collection and the provider's statements to me.     Provider Disclosure:   The documentation recorded by the scribe accurately reflects the services I personally performed and the decisions made by me.    All risks, benefits and alternatives were discussed with patient.  Patient is in agreement and understands the assessment and plan.  All questions were answered.  Sun Screen Education was given.   Return to Clinic in 12 weeks or sooner as needed.   Alyce Logan PA-C   [facility] Dermat[facility]

## 2019-02-19 NOTE — NURSING NOTE
Dermatology Rooming Note    Maria C Goodman's goals for this visit include:   Chief Complaint   Patient presents with     Derm Problem     Hairloss f/u, no changes noted.      Karol Mccarthy LPN

## 2019-02-19 NOTE — LETTER
"2/19/2019       RE: Maria C Goodman  [address] [place] [address]     Dear Colleague,    Thank you for referring your patient, Maria C Goodman, to the [facility] DERMATOLOGY at [facility]. Please see a copy of my visit note below.    [facility] Dermatology Note      Dermatology Problem List:  1. Alopecia Areata:  -s/p ILK  10  -Current Tx: Rogaine, ketoconazole, clobetasol 0.05% shampoo, dermasmoothe FS oil, fluocinonide solution  -Prescribed topical tofacitnib, with plan to have patient hold use until cleared by heme/onc   2. Hand Dermatitis  3. Urticaria, cold aggravated/induced  -Current Tx: Xolair  4. Solar lentigines   -tretinoin 0.1% cream     Encounter Date: Feb 19, 2019    CC:  Chief Complaint   Patient presents with     Derm Problem     Hairloss f/u, no changes noted.          History of Present Illness:  Ms. Maria C Goodman is a 63 year old female who presents as a follow-up for hairloss. The patient was seen on 01/08/2019 when 4.0  total cc of Kenalog 10 mg/cc was injected in 40 sites on the scalp. At today's visit the patient states after getting her kenalog injections she had a reaction on her face. She states that the reactions lasted 3 to 4 days, and went in to see doctor delroy on 01/11/2019. Regarding the patient scalp she thinks that her scalp is \"okay, its still itchy\". She continues to use her topical medication. She doesn't think that she is getting much growth on the back of the scalp. The patient would like to get more information on compounded topical xeljanz, she would like to know how it works, what it would cost, and side effects. She got approval from her oncologist to move forward with this medication. The patient denies painful, itching, tingling or bleeding lesions unless otherwise noted.        Past Medical History:   Patient Active Problem List   Diagnosis     Alopecia areata     Dermatitis     Hypertrichosis     " Urticaria     Autoimmune disease (H)     Dermatitis, seborrheic     Past Medical History:   Diagnosis Date     Asthma      Hypothyroidism      Lobular breast cancer (H)     s/p chemotherapy and radiation, in remission     Multiple allergies      No past surgical history on file.    Social History:   reports that  has never smoked. she has never used smokeless tobacco.    Family History:  Family History   Problem Relation Age of Onset     Rheumatoid Arthritis Mother      Cancer No family hx of         skin cancer     Skin Cancer No family hx of         no skin cancer       Medications:  Current Outpatient Medications   Medication Sig Dispense Refill     ALBUTEROL IN Inhale into the lungs daily as needed       Calcium Carbonate-Vitamin D (CALCIUM + D PO) Take by mouth daily       cetirizine (ZYRTEC) 10 MG tablet Take 10 mg by mouth daily       Cholecalciferol (VITAMIN D) 1000 UNITS capsule Take 5,000 Units by mouth daily       clobetasol (TEMOVATE) 0.05 % cream Apply a fine layer to scalp in the morning after shampooing, a tube should last 2 months 60 g 1     clobetasol (TEMOVATE) 0.05 % ointment Apply a fine layer to the scalp at night after application of Rogaine, shampoo in am. A tube should last 2 months. 60 g 1     clobetasol propionate 0.05 % SHAM APPLY TOPICALLY TO A DRY SCALP, LEAVE ON FOR 10-15 MINUTES, THEN LATHER AND RINSE. DO EVERY OTHER  mL 11     escitalopram (LEXAPRO) 20 MG tablet Take one tablet by mouth once daily with 10 mg tablet for total dose 30 mg.       EXEMESTANE PO Take by mouth daily       ferrous gluconate (FERGON) 324 (38 FE) MG tablet One tablet daily (Patient not taking: Reported on 10/17/2018) 60 tablet 1     Fluocinolone Acetonide Scalp (DERMA-SMOOTHE/FS SCALP) 0.01 % OIL oil Apply topically once a week Use once a week. Apply 1-2 capfuls to dry scalp, massage in and leave on overnight, wash out in the morning 1 Bottle 11     fluocinonide (LIDEX) 0.05 % solution Use to treat any  areas on the scalp that are itchy as needed. Use twice daily to any areas of complete hair loss. 60 mL 11     ketoconazole (NIZORAL) 2 % shampoo Apply to scalp, lather, then rinse, do every other day alternating with Head & Shoulders 120 mL 5     levothyroxine (SYNTHROID, LEVOTHROID) 112 MCG tablet Take by mouth daily       montelukast (SINGULAIR) 10 MG tablet Take 10 mg by mouth At Bedtime       omalizumab (XOLAIR) 150 MG injection Inject 300 mg Subcutaneous       RANITIDINE HCL PO Take 150 mg by mouth 2 times daily       tacrolimus (PROTOPIC) 0.1 % ointment Apply topically 2 times daily 60 g 1     triamcinolone acetonide (KENALOG) 10 MG/ML injection See med note (Patient not taking: Reported on 10/17/2018) 5 mL 0       Allergies   Allergen Reactions     Acetaminophen Hives     Penicillins Hives     Shellfish Allergy GI Disturbance     Silver Nitrate Hives     tegaderm adhesive dressing      Sulfa Drugs Hives     Vicodin [Hydrocodone-Acetaminophen] Hives       Review of Systems:  -Constitutional: The patient denies fatigue, fevers, chills, unintended weight loss, and night sweats.  -HEENT: Patient denies nonhealing oral sores.  -Skin: As above in HPI. No additional skin concerns.    Physical exam:  Vitals: There were no vitals taken for this visit.  GEN: This is a well developed, well-nourished female in no acute distress, in a pleasant mood.    SKIN: Focused examination of the face scalp and neck  was performed.  -The eyebrows are preserved, eyelashes are preserved, and nails are within normal limits. No pitting or onycholysis.   -Diffuse thinning of scalp hair globally   -Several patches of alopecia diffusely throughout scalp, however most  areas with robust regrowth  -Areas of good regrowth noted within the ophiasis region, though increased thinning of occipital, frontal scalp    -patchy alopecia maintly on the left frontal and left parietal scalp and ophiasis scalp   - Negative hair pull test   -No other  lesions of concern on areas examined.       Impression/Plan:  1. Alopecia areata- waxing and waning. Hx of seborrheic dermatitis (well controlled)    Continue clobetasol 0.05% shampoo 1x per week. (refills given)    Continue ketoconazole 1-2x per week.     Continue rogaine 5% foam 3x per week.    Continue using dermasmoothe/FS 0.01% oil once per week (refills given)    Continue clobetasol cream to new areas of hair loss bid (refills given)    Continue fluocinonide 0.05% solution, apply to areas of the scalp that are itchy as needed. Use BID to any areas of complete hair loss. (refills given)    Kenalog intralesional injection procedure note: After verbal consent and discussion of risks including but not limited to atrophy, pain, and bruising, time out was performed, the patient underwent positioning, 4.0  total cc of Kenalog 10 mg/cc was injected in 40 sites on the scalp. The patient tolerated the procedure well and left the Dermatology clinic in good condition.     Photodocumentation taken for future reference.    Discussed xeljanz treatment, side effects and cost. ÁNGEL Goodrich called in the rx tofacitinib 2% cream to be applied twice daily to the scalp. Additional resources (articles) will be mailed to the patient from nursing staff.    2. HX of Non- specific urticarial appearing eruption of face and arms:    Resolved will clinically monitor     CC Dr. Brown on close of this encounter.  Follow-up in 12 weeks, earlier for new or changing lesions.       Staff Involved:  Staff/Scribe    Staff Involved:    Scribe Disclosure  I, Brendon Larry, cristiane serving as a scribe to document services personally performed by Alyce Logan PA-C, based on data collection and the provider's statements to me.     Provider Disclosure:   The documentation recorded by the scribe accurately reflects the services I personally performed and the decisions made by me.    All risks, benefits and alternatives were discussed with  patient.  Patient is in agreement and understands the assessment and plan.  All questions were answered.  Sun Screen Education was given.   Return to Clinic in 12 weeks or sooner as needed.     Alyce Logan PA-C   [facility]     Drug Administration Record    Prior to injection, verified patient identity using patient's name and date of birth.  Due to injection administration, patient instructed to remain in clinic for 15 minutes  afterwards, and to report any adverse reaction to me immediately.    Drug Name: triamcinolone acetonide(kenalog)  Dose: 4mL of triamcinolone 10mg/mL, 40mg dose  Route administered: ID  NDC #: [id number]: Kenalog-10 (1750-5183-18)  Amount of waste(mL):1  Reason for waste: Multi dose vial used as single use    LOT #: [id number]  SITE: see note  : [employer]  EXPIRATION DATE: 8/2020

## 2019-02-19 NOTE — PROGRESS NOTES
Drug Administration Record    Prior to injection, verified patient identity using patient's name and date of birth.  Due to injection administration, patient instructed to remain in clinic for 15 minutes  afterwards, and to report any adverse reaction to me immediately.    Drug Name: triamcinolone acetonide(kenalog)  Dose: 4mL of triamcinolone 10mg/mL, 40mg dose  Route administered: ID  NDC #: [id number]: Kenalog-10 ([id number])  Amount of waste(mL):1  Reason for waste: Multi dose vial used as single use    LOT #: [id number]  SITE: see note  : [employer]  EXPIRATION DATE: 8/2020

## 2019-02-21 ENCOUNTER — TELEPHONE (OUTPATIENT)
Dept: DERMATOLOGY | Facility: CLINIC | Age: 64
End: 2019-02-21

## 2019-04-01 ENCOUNTER — OFFICE VISIT (OUTPATIENT)
Dept: DERMATOLOGY | Facility: CLINIC | Age: 64
End: 2019-04-01
Payer: COMMERCIAL

## 2019-04-01 VITALS — HEART RATE: 82 BPM | DIASTOLIC BLOOD PRESSURE: 92 MMHG | SYSTOLIC BLOOD PRESSURE: 139 MMHG | OXYGEN SATURATION: 96 %

## 2019-04-01 DIAGNOSIS — L30.9 DERMATITIS: ICD-10-CM

## 2019-04-01 DIAGNOSIS — L63.9 ALOPECIA AREATA: Primary | ICD-10-CM

## 2019-04-01 DIAGNOSIS — L50.9 URTICARIA: ICD-10-CM

## 2019-04-01 ASSESSMENT — PAIN SCALES - GENERAL: PAINLEVEL: NO PAIN (0)

## 2019-04-01 NOTE — PROGRESS NOTES
[facility] Dermatology Note      Dermatology Problem List:  1. Alopecia Areata:  -Current Tx: Rogaine 5% qD, clobetasol 0.05% shampoo 3x/w, dermasmoothe FS oil 1x/w  -last ILK 10 on 2/19/19  2. Hand Dermatitis  3. Urticaria, cold aggravated/induced  -Current Tx: Xolair  4. Solar lentigines   -tretinoin 0.1% cream     5.  Elevated blood pressure reading - follow up with primary care    Encounter Date: Apr 1, 2019    CC:  Chief Complaint   Patient presents with     Hair/Scalp Problem     Maria C is here today to be seen for Hair Loss-patient states no change since her last appt.        History of Present Illness:  Ms. Maria C Goodman is a 63 year old female with a history of breast cancer s/p bilateral mastectomy and chemoradiation therapy who presents as a follow-up for AA. The patient was last seen 2/19/19 when ILK 10 injections were performed. She noticed waking up the next day with facial redness, swelling, and an itch like sensation. It faded the next day and disappeared the day after that. This also occurred after the previous ILK injection for the first time. We also discussed starting topical tofactinib at this appointment, the hesitation primarily being her hx of breast cancer. Did get the okay from heme/onc to start it but she wanted to wait until this appointment to decide. Her scalp pruritus is much better than earlier this winter. She states it stays at the same low level of slightly annoying. She thinks her hair loss is overall better and is likely why she hasn't been as good at keeping up with her regimen. Has been using Rogaine 1-2x/w, clobex 1-2x/w, dermasmoothe 1x/w and has not been using ketoconazole.     Feels well today. Has hx of ocular migraines, last 10 minutes, usually gets one every six weeks. Now has had four in last 10 day. Plans to call retinal specialist in [place] later today about this.     Past Medical History:   Patient Active Problem List   Diagnosis     Alopecia areata      Dermatitis     Hypertrichosis     Urticaria     Autoimmune disease (H)     Dermatitis, seborrheic     Past Medical History:   Diagnosis Date     Asthma      Hypothyroidism      Lobular breast cancer (H)     s/p chemotherapy and radiation, in remission     Multiple allergies      No past surgical history on file.    Social History:  Patient reports that  has never smoked. she has never used smokeless tobacco.    Family History:  Family History   Problem Relation Age of Onset     Rheumatoid Arthritis Mother      Cancer No family hx of         skin cancer     Skin Cancer No family hx of         no skin cancer       Medications:  Current Outpatient Medications   Medication Sig Dispense Refill     ALBUTEROL IN Inhale into the lungs daily as needed       Calcium Carbonate-Vitamin D (CALCIUM + D PO) Take by mouth daily       cetirizine (ZYRTEC) 10 MG tablet Take 10 mg by mouth daily       Cholecalciferol (VITAMIN D) 1000 UNITS capsule Take 5,000 Units by mouth daily       clobetasol (TEMOVATE) 0.05 % external cream Apply a fine layer to scalp in the morning after shampooing, a tube should last 2 months 60 g 1     clobetasol (TEMOVATE) 0.05 % ointment Apply a fine layer to the scalp at night after application of Rogaine, shampoo in am. A tube should last 2 months. 60 g 1     clobetasol propionate (CLOBEX) 0.05 % external shampoo APPLY TOPICALLY TO A DRY SCALP, LEAVE ON FOR 10-15 MINUTES, THEN LATHER AND RINSE. DO EVERY OTHER  mL 11     escitalopram (LEXAPRO) 20 MG tablet Take one tablet by mouth once daily with 10 mg tablet for total dose 30 mg.       EXEMESTANE PO Take by mouth daily       ferrous gluconate (FERGON) 324 (38 FE) MG tablet One tablet daily (Patient not taking: Reported on 10/17/2018) 60 tablet 1     Fluocinolone Acetonide Scalp (DERMA-SMOOTHE/FS SCALP) 0.01 % OIL oil Apply topically once a week Use once a week. Apply 1-2 capfuls to dry scalp, massage in and leave on overnight, wash out in the  morning 1 Bottle 11     fluocinonide (LIDEX) 0.05 % external solution Use to treat any areas on the scalp that are itchy as needed. Use twice daily to any areas of complete hair loss. 60 mL 11     ketoconazole (NIZORAL) 2 % shampoo Apply to scalp, lather, then rinse, do every other day alternating with Head & Shoulders 120 mL 5     levothyroxine (SYNTHROID, LEVOTHROID) 112 MCG tablet Take by mouth daily       montelukast (SINGULAIR) 10 MG tablet Take 10 mg by mouth At Bedtime       omalizumab (XOLAIR) 150 MG injection Inject 300 mg Subcutaneous       RANITIDINE HCL PO Take 150 mg by mouth 2 times daily       tacrolimus (PROTOPIC) 0.1 % ointment Apply topically 2 times daily 60 g 1     triamcinolone acetonide (KENALOG) 10 MG/ML injection See med note (Patient not taking: Reported on 10/17/2018) 5 mL 0     Allergies   Allergen Reactions     Acetaminophen Hives     Penicillins Hives     Shellfish Allergy GI Disturbance     Silver Nitrate Hives     tegaderm adhesive dressing      Sulfa Drugs Hives     Vicodin [Hydrocodone-Acetaminophen] Hives       Review of Systems:  -As per HPI  -Constitutional: Otherwise feeling well today, in usual state of health.  -HEENT: Patient denies nonhealing oral sores.  -Skin: As above in HPI. No additional skin concerns.    Physical exam:  Vitals: BP (!) 139/92 (BP Location: Right arm, Patient Position: Sitting, Cuff Size: Adult Small)   Pulse 82   SpO2 96%   GEN: This is a well developed, well-nourished female in no acute distress, in a pleasant mood.    SKIN: Focused examination of the face scalp and neck  was performed.  -The eyebrows are preserved, eyelashes are preserved, and nails are within normal limits. No pitting or onycholysis.   -Diffuse thinning of scalp hair globally   -Several patches of alopecia diffusely throughout scalp, however most areas with robust regrowth (L>R).  -Areas of good regrowth noted within the ophiasis region   -patchy alopecia mainly on the left frontal  and left parietal scalp, ophiasis scalp   -Negative hair pull test   -No other lesions of concern on areas examined.     Impression/Plan:  1. Alopecia areata- waning with robust regrowth. Hx of seborrheic dermatitis (well controlled)    Increase clobetasol 0.05% shampoo to 3x per week.    Increase rogaine 5% foam to daily, goal of one canister per month.    Continue using dermasmoothe/FS 0.01% oil once per week.    Continue clobetasol cream to new areas of hair loss     Ketoconazole and tofactinib not needed at this time    Photodocumentation completed     2. Hx of Non-specific urticarial appearing eruption of face and arms:    Appears to be associated with ILK injections. Discussed that this has occurred with other patients. Given condition of scalp with regrowth. ILK injection not needed today.     On Zyrtec due to seasonal allergies    3. Elevated blood pressure reading - follow up with primary [place] [address] on close of this encounter.  Follow-up in 3 months, earlier for new or changing lesions.     Staff Involved:  I, Dario Mccarty, saw and examined the patient in the presence of Dr. Brown.    Staff Physician:  I was present with the medical student who participated in the service and in the documentation of the note. I have verified the history and personally performed the physical exam and medical decision making. I agree with the assessment and plan of care as documented in the note.       Nita Brown MD  Professor and Chair  Department of Dermatology  [facility]: Phone: [phone], Fax:[phone]  [facility]: Phone: [phone], Fax: [phone]

## 2019-04-01 NOTE — PATIENT INSTRUCTIONS
-Rogaine 5%, increase use, goal is to use a canister a month   -Clobex shampoo up to 3x a week   -Continue oil 1x per week   -Tofactinab (madie inhibitor) topical and ketoconazole not needed at this time

## 2019-04-01 NOTE — NURSING NOTE
Chief Complaint   Patient presents with     Hair/Scalp Problem     Maria C is here today to be seen for Hair Loss-patient states no change since her last appt.      Kaye Metzger LPN

## 2019-04-01 NOTE — LETTER
4/1/2019       RE: Maria C Goodman  [address] [place] [address]     Dear Colleague,    Thank you for referring your patient, Maria C Goodman, to the [facility] DERMATOLOGY [facility]. Please see a copy of my visit note below.      [facility] Dermatology Note      Dermatology Problem List:  1. Alopecia Areata:  -Current Tx: Rogaine 5% qD, clobetasol 0.05% shampoo 3x/w, dermasmoothe FS oil 1x/w  -last ILK 10 on 2/19/19  2. Hand Dermatitis  3. Urticaria, cold aggravated/induced  -Current Tx: Xolair  4. Solar lentigines   -tretinoin 0.1% cream     Encounter Date: Apr 1, 2019    CC:  Chief Complaint   Patient presents with     Hair/Scalp Problem     Maria C is here today to be seen for Hair Loss-patient states no change since her last appt.        History of Present Illness:  Ms. Maria C Goodman is a 63 year old female with a history of breast cancer s/p bilateral mastectomy and chemoradiation therapy who presents as a follow-up for AA. The patient was last seen 2/19/19 when ILK 10 injections were performed. She noticed waking up the next day with facial redness, swelling, and an itch like sensation. It faded the next day and disappeared the day after that. This also occurred after the previous ILK injection for the first time. We also discussed starting topical tofactinib at this appointment, the hesitation primarily being her hx of breast cancer. Did get the okay from heme/onc to start it but she wanted to wait until this appointment to decide. Her scalp pruritus is much better than earlier this winter. She states it stays at the same low level of slightly annoying. She thinks her hair loss is overall better and is likely why she hasn't been as good at keeping up with her regimen. Has been using Rogaine 1-2x/w, clobex 1-2x/w, dermasmoothe 1x/w and has not been using ketoconazole.     Feels well today. Has hx of ocular migraines, last 10 minutes, usually  gets one every six weeks. Now has had four in last 10 day. Plans to call retinal specialist in [place] later today about this.     Past Medical History:   Patient Active Problem List   Diagnosis     Alopecia areata     Dermatitis     Hypertrichosis     Urticaria     Autoimmune disease (H)     Dermatitis, seborrheic     Past Medical History:   Diagnosis Date     Asthma      Hypothyroidism      Lobular breast cancer (H)     s/p chemotherapy and radiation, in remission     Multiple allergies      No past surgical history on file.    Social History:  Patient reports that  has never smoked. she has never used smokeless tobacco.    Family History:  Family History   Problem Relation Age of Onset     Rheumatoid Arthritis Mother      Cancer No family hx of         skin cancer     Skin Cancer No family hx of         no skin cancer       Medications:  Current Outpatient Medications   Medication Sig Dispense Refill     ALBUTEROL IN Inhale into the lungs daily as needed       Calcium Carbonate-Vitamin D (CALCIUM + D PO) Take by mouth daily       cetirizine (ZYRTEC) 10 MG tablet Take 10 mg by mouth daily       Cholecalciferol (VITAMIN D) 1000 UNITS capsule Take 5,000 Units by mouth daily       clobetasol (TEMOVATE) 0.05 % external cream Apply a fine layer to scalp in the morning after shampooing, a tube should last 2 months 60 g 1     clobetasol (TEMOVATE) 0.05 % ointment Apply a fine layer to the scalp at night after application of Rogaine, shampoo in am. A tube should last 2 months. 60 g 1     clobetasol propionate (CLOBEX) 0.05 % external shampoo APPLY TOPICALLY TO A DRY SCALP, LEAVE ON FOR 10-15 MINUTES, THEN LATHER AND RINSE. DO EVERY OTHER  mL 11     escitalopram (LEXAPRO) 20 MG tablet Take one tablet by mouth once daily with 10 mg tablet for total dose 30 mg.       EXEMESTANE PO Take by mouth daily       ferrous gluconate (FERGON) 324 (38 FE) MG tablet One tablet daily (Patient not taking: Reported on 10/17/2018)  60 tablet 1     Fluocinolone Acetonide Scalp (DERMA-SMOOTHE/FS SCALP) 0.01 % OIL oil Apply topically once a week Use once a week. Apply 1-2 capfuls to dry scalp, massage in and leave on overnight, wash out in the morning 1 Bottle 11     fluocinonide (LIDEX) 0.05 % external solution Use to treat any areas on the scalp that are itchy as needed. Use twice daily to any areas of complete hair loss. 60 mL 11     ketoconazole (NIZORAL) 2 % shampoo Apply to scalp, lather, then rinse, do every other day alternating with Head & Shoulders 120 mL 5     levothyroxine (SYNTHROID, LEVOTHROID) 112 MCG tablet Take by mouth daily       montelukast (SINGULAIR) 10 MG tablet Take 10 mg by mouth At Bedtime       omalizumab (XOLAIR) 150 MG injection Inject 300 mg Subcutaneous       RANITIDINE HCL PO Take 150 mg by mouth 2 times daily       tacrolimus (PROTOPIC) 0.1 % ointment Apply topically 2 times daily 60 g 1     triamcinolone acetonide (KENALOG) 10 MG/ML injection See med note (Patient not taking: Reported on 10/17/2018) 5 mL 0     Allergies   Allergen Reactions     Acetaminophen Hives     Penicillins Hives     Shellfish Allergy GI Disturbance     Silver Nitrate Hives     tegaderm adhesive dressing      Sulfa Drugs Hives     Vicodin [Hydrocodone-Acetaminophen] Hives       Review of Systems:  -As per HPI  -Constitutional: Otherwise feeling well today, in usual state of health.  -HEENT: Patient denies nonhealing oral sores.  -Skin: As above in HPI. No additional skin concerns.    Physical exam:  Vitals: BP (!) 139/92 (BP Location: Right arm, Patient Position: Sitting, Cuff Size: Adult Small)   Pulse 82   SpO2 96%   GEN: This is a well developed, well-nourished female in no acute distress, in a pleasant mood.    SKIN: Focused examination of the face scalp and neck  was performed.  -The eyebrows are preserved, eyelashes are preserved, and nails are within normal limits. No pitting or onycholysis.   -Diffuse thinning of scalp hair  globally   -Several patches of alopecia diffusely throughout scalp, however most areas with robust regrowth (L>R).  -Areas of good regrowth noted within the ophiasis region   -patchy alopecia mainly on the left frontal and left parietal scalp, ophiasis scalp   -Negative hair pull test   -No other lesions of concern on areas examined.     Impression/Plan:  1. Alopecia areata- waning with robust regrowth. Hx of seborrheic dermatitis (well controlled)    Increase clobetasol 0.05% shampoo to 3x per week.    Increase rogaine 5% foam to daily, goal of one canister per month.    Continue using dermasmoothe/FS 0.01% oil once per week.    Continue clobetasol cream to new areas of hair loss     Ketoconazole and tofactinib not needed at this time    Photodocumentation completed     2. Hx of Non-specific urticarial appearing eruption of face and arms:    Appears to be associated with ILK injections. Discussed that this has occurred with other patients. Given condition of scalp with regrowth. ILK injection not needed today.     On Zyrtec due to seasonal allergies    [facility]  [id number] [place] [address] on close of this encounter.  Follow-up in 3 months, earlier for new or changing lesions.     Staff Involved:  I, Dario Mccarty, saw and examined the patient in the presence of Dr. Brown.    Staff Physician:  I was present with the medical student who participated in the service and in the documentation of the note. I have verified the history and personally performed the physical exam and medical decision making. I agree with the assessment and plan of care as documented in the note.       Nita Brown MD  Professor and Chair  Department of Dermatology  [facility]: Phone: [phone], Fax:[phone]  [facility]: Phone: [phone], Fax: [phone]                [facility] Dermatology Note      Dermatology Problem List:  1. Alopecia Areata:  -Current Tx: Rogaine 5% qD, clobetasol 0.05% shampoo 3x/w, dermasmoothe FS oil 1x/w  -last ILK 10 on 2/19/19  2. Hand Dermatitis  3. Urticaria, cold aggravated/induced  -Current Tx: Xolair  4. Solar lentigines   -tretinoin 0.1% cream     5.  Elevated blood pressure reading - follow up with primary care    Encounter Date: Apr 1, 2019    CC:  Chief Complaint   Patient presents with     Hair/Scalp Problem     Maria C is here today to be seen for Hair Loss-patient states no change since her last appt.        History of Present Illness:  Ms. Maria C Goodman is a 63 year old female with a history of breast cancer s/p bilateral mastectomy and chemoradiation therapy who presents as a follow-up for AA. The patient was last seen 2/19/19 when ILK 10 injections were performed. She noticed waking up the next day with facial redness, swelling, and an itch like sensation. It faded the next day and disappeared the day after that. This also occurred after the previous ILK injection for the first time. We also discussed starting topical tofactinib at this appointment, the hesitation primarily being her hx of breast cancer. Did get the okay from heme/onc to start it but she wanted to wait until this appointment to decide. Her scalp pruritus is much better than earlier this winter. She states it stays at the same low level of slightly annoying. She thinks her hair loss is overall better and is likely why she hasn't been as good at keeping up with her regimen. Has been using Rogaine 1-2x/w, clobex 1-2x/w, dermasmoothe 1x/w and has not been using ketoconazole.     Feels well today. Has hx of ocular migraines, last 10 minutes, usually gets one every six weeks. Now has had four in last 10 day. Plans to call retinal specialist in [place] later today about this.     Past Medical History:   Patient Active Problem List   Diagnosis     Alopecia  areata     Dermatitis     Hypertrichosis     Urticaria     Autoimmune disease (H)     Dermatitis, seborrheic     Past Medical History:   Diagnosis Date     Asthma      Hypothyroidism      Lobular breast cancer (H)     s/p chemotherapy and radiation, in remission     Multiple allergies      No past surgical history on file.    Social History:  Patient reports that  has never smoked. she has never used smokeless tobacco.    Family History:  Family History   Problem Relation Age of Onset     Rheumatoid Arthritis Mother      Cancer No family hx of         skin cancer     Skin Cancer No family hx of         no skin cancer       Medications:  Current Outpatient Medications   Medication Sig Dispense Refill     ALBUTEROL IN Inhale into the lungs daily as needed       Calcium Carbonate-Vitamin D (CALCIUM + D PO) Take by mouth daily       cetirizine (ZYRTEC) 10 MG tablet Take 10 mg by mouth daily       Cholecalciferol (VITAMIN D) 1000 UNITS capsule Take 5,000 Units by mouth daily       clobetasol (TEMOVATE) 0.05 % external cream Apply a fine layer to scalp in the morning after shampooing, a tube should last 2 months 60 g 1     clobetasol (TEMOVATE) 0.05 % ointment Apply a fine layer to the scalp at night after application of Rogaine, shampoo in am. A tube should last 2 months. 60 g 1     clobetasol propionate (CLOBEX) 0.05 % external shampoo APPLY TOPICALLY TO A DRY SCALP, LEAVE ON FOR 10-15 MINUTES, THEN LATHER AND RINSE. DO EVERY OTHER  mL 11     escitalopram (LEXAPRO) 20 MG tablet Take one tablet by mouth once daily with 10 mg tablet for total dose 30 mg.       EXEMESTANE PO Take by mouth daily       ferrous gluconate (FERGON) 324 (38 FE) MG tablet One tablet daily (Patient not taking: Reported on 10/17/2018) 60 tablet 1     Fluocinolone Acetonide Scalp (DERMA-SMOOTHE/FS SCALP) 0.01 % OIL oil Apply topically once a week Use once a week. Apply 1-2 capfuls to dry scalp, massage in and leave on overnight, wash out in  the morning 1 Bottle 11     fluocinonide (LIDEX) 0.05 % external solution Use to treat any areas on the scalp that are itchy as needed. Use twice daily to any areas of complete hair loss. 60 mL 11     ketoconazole (NIZORAL) 2 % shampoo Apply to scalp, lather, then rinse, do every other day alternating with Head & Shoulders 120 mL 5     levothyroxine (SYNTHROID, LEVOTHROID) 112 MCG tablet Take by mouth daily       montelukast (SINGULAIR) 10 MG tablet Take 10 mg by mouth At Bedtime       omalizumab (XOLAIR) 150 MG injection Inject 300 mg Subcutaneous       RANITIDINE HCL PO Take 150 mg by mouth 2 times daily       tacrolimus (PROTOPIC) 0.1 % ointment Apply topically 2 times daily 60 g 1     triamcinolone acetonide (KENALOG) 10 MG/ML injection See med note (Patient not taking: Reported on 10/17/2018) 5 mL 0     Allergies   Allergen Reactions     Acetaminophen Hives     Penicillins Hives     Shellfish Allergy GI Disturbance     Silver Nitrate Hives     tegaderm adhesive dressing      Sulfa Drugs Hives     Vicodin [Hydrocodone-Acetaminophen] Hives       Review of Systems:  -As per HPI  -Constitutional: Otherwise feeling well today, in usual state of health.  -HEENT: Patient denies nonhealing oral sores.  -Skin: As above in HPI. No additional skin concerns.    Physical exam:  Vitals: BP (!) 139/92 (BP Location: Right arm, Patient Position: Sitting, Cuff Size: Adult Small)   Pulse 82   SpO2 96%   GEN: This is a well developed, well-nourished female in no acute distress, in a pleasant mood.    SKIN: Focused examination of the face scalp and neck  was performed.  -The eyebrows are preserved, eyelashes are preserved, and nails are within normal limits. No pitting or onycholysis.   -Diffuse thinning of scalp hair globally   -Several patches of alopecia diffusely throughout scalp, however most areas with robust regrowth (L>R).  -Areas of good regrowth noted within the ophiasis region   -patchy alopecia mainly on the left  frontal and left parietal scalp, ophiasis scalp   -Negative hair pull test   -No other lesions of concern on areas examined.     Impression/Plan:  2. Alopecia areata- waning with robust regrowth. Hx of seborrheic dermatitis (well controlled)    Increase clobetasol 0.05% shampoo to 3x per week.    Increase rogaine 5% foam to daily, goal of one canister per month.    Continue using dermasmoothe/FS 0.01% oil once per week.    Continue clobetasol cream to new areas of hair loss     Ketoconazole and tofactinib not needed at this time    Photodocumentation completed     3. Hx of Non-specific urticarial appearing eruption of face and arms:    Appears to be associated with ILK injections. Discussed that this has occurred with other patients. Given condition of scalp with regrowth. ILK injection not needed today.     On Zyrtec due to seasonal allergies    3. Elevated blood pressure reading - follow up with primary [place] [address] on close of this encounter.  Follow-up in 3 months, earlier for new or changing lesions.     Staff Involved:  I, Dario Mccarty, saw and examined the patient in the presence of Dr. Brown.    Staff Physician:  I was present with the medical student who participated in the service and in the documentation of the note. I have verified the history and personally performed the physical exam and medical decision making. I agree with the assessment and plan of care as documented in the note.       Nita Brown MD  Professor and Chair  Department of Dermatology  [facility]: Phone: [phone], Fax:[phone]  [facility]: Phone: [phone], Fax: [phone]

## 2019-07-02 ENCOUNTER — OFFICE VISIT (OUTPATIENT)
Dept: DERMATOLOGY | Facility: CLINIC | Age: 64
End: 2019-07-02
Payer: COMMERCIAL

## 2019-07-02 VITALS — SYSTOLIC BLOOD PRESSURE: 116 MMHG | HEART RATE: 87 BPM | DIASTOLIC BLOOD PRESSURE: 77 MMHG

## 2019-07-02 DIAGNOSIS — L63.9 ALOPECIA AREATA: Primary | ICD-10-CM

## 2019-07-02 DIAGNOSIS — M35.9 AUTOIMMUNE DISEASE (H): ICD-10-CM

## 2019-07-02 DIAGNOSIS — L21.9 DERMATITIS, SEBORRHEIC: ICD-10-CM

## 2019-07-02 DIAGNOSIS — T50.905D ADVERSE EFFECT OF DRUG, SUBSEQUENT ENCOUNTER: ICD-10-CM

## 2019-07-02 ASSESSMENT — PAIN SCALES - GENERAL: PAINLEVEL: NO PAIN (0)

## 2019-07-02 NOTE — PATIENT INSTRUCTIONS
Please let us know if you have another hive reaction.   Please take photos and send us a [employer] message if this happens again.  Can reduce clobex shampoo to 2 times weekly until follow up.  Otherwise keep regimen the same.

## 2019-07-02 NOTE — PROGRESS NOTES
Drug Administration Record    Prior to injection, verified patient identity using patient's name and date of birth.  Due to injection administration, patient instructed to remain in clinic for 15 minutes  afterwards, and to report any adverse reaction to me immediately.    Drug Name: triamcinolone acetonide(kenalog)  Dose: 2mL of triamcinolone 10mg/mL, 20mg dose  Route administered: ID  NDC #: [id number]: Kenalog-10 ([id number])  Amount of waste(mL):3mL  Reason for waste: Single use vial    LOT #: [id number]  SITE: SCALP  : [employer]  EXPIRATION DATE: 10/2020

## 2019-07-02 NOTE — NURSING NOTE
"Dermatology Rooming Note    Maria C Goodman's goals for this visit include:   Chief Complaint   Patient presents with     Derm Problem     Maria C is here for a hairloss follow up, states some areas are improving, and some are \"questionable.\" States her scalp is itchy.        Caroline Parisi, MARGARETTE    "

## 2019-07-02 NOTE — LETTER
"7/2/2019       RE: Maria C Goodman  [address] [place] [address]     Dear Colleague,    Thank you for referring your patient, Maria C Goodman, to the [facility] DERMATOLOGY at [facility]. Please see a copy of my visit note below.    [facility] Dermatology Note      Dermatology Problem List:  1. Alopecia Areata:  -Current Tx: Rogaine 5% 4x/wk, clobetasol 0.05% shampoo 2x/w, dermasmoothe FS oil 1x/w  -last ILK 10 on 7/2/19  2. Hand Dermatitis  3. Urticaria, cold aggravated/induced  -Current Tx: Xolair  4. Solar lentigines   -tretinoin 0.1% cream       Encounter Date: Jul 2, 2019    CC:  Chief Complaint   Patient presents with     Derm Problem     Maria C is here for a hairloss follow up, states some areas are improving, and some are \"questionable.\" States her scalp is itchy.        History of Present Illness:  Ms. Maria C Goodman is a 64 year old female with a history of breast cancer s/p bilateral mastectomy and chemoradiation therapy who presents as a follow-up for AA. The patient was last seen 4/1/19 when ILK was deferred due to an urticarial eruption that was attributed to the ILK. She was instead continued on a topical regimen of clobetasol shampoo 2-3x/week, Rogaine 4x/week, dermasmoothe 1x/week and clobetasol cream PRN to any new areas of hair loss. Today patient reports things have been going well. She reports she was very diligent with her topical regimen - made herself a chart which helped. Has continued to have some itching of the scalp, slightly increased from prior. Wonders if this is due to increased use of the clobex shampoo - this is somewhat drying. No burning. Sometimes the scalp feels tender but this is mild. Patient prefers to avoid the oral tofacitinib again for now (did get the okay from heme/onc) but she prefers not to take another medication for the time being. Is open to trying ILK again today. No other new concerns.      Past Medical " History:   Patient Active Problem List   Diagnosis     Alopecia areata     Dermatitis     Hypertrichosis     Urticaria     Autoimmune disease (H)     Dermatitis, seborrheic     Past Medical History:   Diagnosis Date     Asthma      Hypothyroidism      Lobular breast cancer (H)     s/p chemotherapy and radiation, in remission     Multiple allergies      No past surgical history on file.    Social History:  Patient reports that she has never smoked. She has never used smokeless tobacco.    Family History:  Family History   Problem Relation Age of Onset     Rheumatoid Arthritis Mother      Cancer No family hx of         skin cancer     Skin Cancer No family hx of         no skin cancer       Medications:  Current Outpatient Medications   Medication Sig Dispense Refill     ALBUTEROL IN Inhale into the lungs daily as needed       Calcium Carbonate-Vitamin D (CALCIUM + D PO) Take by mouth daily       cetirizine (ZYRTEC) 10 MG tablet Take 10 mg by mouth daily       clobetasol (TEMOVATE) 0.05 % external cream Apply a fine layer to scalp in the morning after shampooing, a tube should last 2 months 60 g 1     clobetasol (TEMOVATE) 0.05 % ointment Apply a fine layer to the scalp at night after application of Rogaine, shampoo in am. A tube should last 2 months. 60 g 1     clobetasol propionate (CLOBEX) 0.05 % external shampoo APPLY TOPICALLY TO A DRY SCALP, LEAVE ON FOR 10-15 MINUTES, THEN LATHER AND RINSE. DO EVERY OTHER  mL 11     escitalopram (LEXAPRO) 20 MG tablet Take one tablet by mouth once daily with 10 mg tablet for total dose 30 mg.       EXEMESTANE PO Take by mouth daily       Fluocinolone Acetonide Scalp (DERMA-SMOOTHE/FS SCALP) 0.01 % OIL oil Apply topically once a week Use once a week. Apply 1-2 capfuls to dry scalp, massage in and leave on overnight, wash out in the morning 1 Bottle 11     fluocinonide (LIDEX) 0.05 % external solution Use to treat any areas on the scalp that are itchy as needed. Use twice  daily to any areas of complete hair loss. 60 mL 11     ketoconazole (NIZORAL) 2 % shampoo Apply to scalp, lather, then rinse, do every other day alternating with Head & Shoulders 120 mL 5     levothyroxine (SYNTHROID, LEVOTHROID) 112 MCG tablet Take by mouth daily       montelukast (SINGULAIR) 10 MG tablet Take 10 mg by mouth At Bedtime       RANITIDINE HCL PO Take 150 mg by mouth 2 times daily       tacrolimus (PROTOPIC) 0.1 % ointment Apply topically 2 times daily 60 g 1     Cholecalciferol (VITAMIN D) 1000 UNITS capsule Take 5,000 Units by mouth daily       ferrous gluconate (FERGON) 324 (38 FE) MG tablet One tablet daily (Patient not taking: Reported on 10/17/2018) 60 tablet 1     omalizumab (XOLAIR) 150 MG injection Inject 300 mg Subcutaneous       triamcinolone acetonide (KENALOG) 10 MG/ML injection See med note (Patient not taking: Reported on 10/17/2018) 5 mL 0     Allergies   Allergen Reactions     Acetaminophen Hives     Penicillins Hives     Shellfish Allergy GI Disturbance     Silver Nitrate Hives     tegaderm adhesive dressing      Sulfa Drugs Hives     Vicodin [Hydrocodone-Acetaminophen] Hives       Review of Systems:  -As per HPI  -Constitutional: Otherwise feeling well today, in usual state of health.  -Skin: As above in HPI. No additional skin concerns.    Physical exam:  Vitals: /77 (BP Location: Left arm, Patient Position: Chair, Cuff Size: Adult Regular)   Pulse 87   GEN: This is a well developed, well-nourished female in no acute distress, in a pleasant mood.    SKIN: Focused examination of the face scalp and neck  was performed.  -The eyebrows are preserved, eyelashes are preserved, and nails are within normal limits. No pitting or onycholysis.   -Diffuse thinning of scalp hair globally   -Patchy alopecia mainly on the left frontal and left parietal scalp, ophiasis scalp   -Negative hair pull test   -No other lesions of concern on areas examined.     Impression/Plan:  1. Alopecia areata  - stable. Hx of seborrheic dermatitis (well controlled)    Reduce clobetasol 0.05% shampoo to 2x per week.    Continue rogaine 5% foam to daily, goal of one canister per month.    Continue using dermasmoothe/FS 0.01% oil once per week.    Continue clobetasol cream to new areas of hair loss     Ketoconazole and tofactinib not needed at this time  Kenalog intralesional injection procedure note: After verbal consent and discussion of risks including but not limited to atrophy, pain, and bruising, time out was performed, the patient underwent positioning, 2 total cc of Kenalog 10 mg/cc was injected into >7 sites on the scalp.  The patient tolerated the procedure well and left the Dermatology clinic in good condition.      Photodocumentation completed     2. Hx of Non-specific urticarial appearing eruption of face and arms:    Appears to be associated with ILK injections. Discussed that this has occurred with other patients. Patient will continue to monitor for reaction, if this happens again she will take photos and send via [employer].    On Zyrtec due to seasonal allergies, can take 1 to 2 additional antihistamines at time of reaction should it occur again        [facility]  [address] [place] [address] on close of this encounter.    Follow-up in 3-4 months, earlier for new or changing lesions.     Staffed with Dr. Brown    Staff Involved:  Resident(Lai Pdailla)/Staff(as above)    Lai Padilla MD  Dermatology Resident, PGY4    Patient was seen and examined with the dermatology resident. I agree with the history, review of systems, physical examination, assessments and plan. ILK injections were done together.    Nita Brown MD  Professor and  Chair  Department of Dermatology  [facility]                                                 Pictures were placed in Pt's chart today for future reference.      Drug Administration Record    Prior to injection, verified  patient identity using patient's name and date of birth.  Due to injection administration, patient instructed to remain in clinic for 15 minutes  afterwards, and to report any adverse reaction to me immediately.    Drug Name: triamcinolone acetonide(kenalog)  Dose: 2mL of triamcinolone 10mg/mL, 20mg dose  Route administered: ID  NDC #: [id number]: Kenalog-10 ([id number])  Amount of waste(mL):3mL  Reason for waste: Single use vial    LOT #: [id number]  SITE: SCALP  : [employer]  EXPIRATION DATE: 10/2020    Again, thank you for allowing me to participate in the care of your patient.      Sincerely,    Ntia Brown MD

## 2019-07-02 NOTE — PROGRESS NOTES
"[facility] Dermatology Note      Dermatology Problem List:  1. Alopecia Areata:  -Current Tx: Rogaine 5% 4x/wk, clobetasol 0.05% shampoo 2x/w, dermasmoothe FS oil 1x/w  -last ILK 10 on 7/2/19  2. Hand Dermatitis  3. Urticaria, cold aggravated/induced  -Current Tx: Xolair  4. Solar lentigines   -tretinoin 0.1% cream       Encounter Date: Jul 2, 2019    CC:  Chief Complaint   Patient presents with     Derm Problem     Maria C is here for a hairloss follow up, states some areas are improving, and some are \"questionable.\" States her scalp is itchy.        History of Present Illness:  Ms. Maria C Goodman is a 64 year old female with a history of breast cancer s/p bilateral mastectomy and chemoradiation therapy who presents as a follow-up for AA. The patient was last seen 4/1/19 when ILK was deferred due to an urticarial eruption that was attributed to the ILK. She was instead continued on a topical regimen of clobetasol shampoo 2-3x/week, Rogaine 4x/week, dermasmoothe 1x/week and clobetasol cream PRN to any new areas of hair loss. Today patient reports things have been going well. She reports she was very diligent with her topical regimen - made herself a chart which helped. Has continued to have some itching of the scalp, slightly increased from prior. Wonders if this is due to increased use of the clobex shampoo - this is somewhat drying. No burning. Sometimes the scalp feels tender but this is mild. Patient prefers to avoid the oral tofacitinib again for now (did get the okay from heme/onc) but she prefers not to take another medication for the time being. Is open to trying ILK again today. No other new concerns.      Past Medical History:   Patient Active Problem List   Diagnosis     Alopecia areata     Dermatitis     Hypertrichosis     Urticaria     Autoimmune disease (H)     Dermatitis, seborrheic     Past Medical History:   Diagnosis Date     Asthma      Hypothyroidism      Lobular breast cancer (H)  "    s/p chemotherapy and radiation, in remission     Multiple allergies      No past surgical history on file.    Social History:  Patient reports that she has never smoked. She has never used smokeless tobacco.    Family History:  Family History   Problem Relation Age of Onset     Rheumatoid Arthritis Mother      Cancer No family hx of         skin cancer     Skin Cancer No family hx of         no skin cancer       Medications:  Current Outpatient Medications   Medication Sig Dispense Refill     ALBUTEROL IN Inhale into the lungs daily as needed       Calcium Carbonate-Vitamin D (CALCIUM + D PO) Take by mouth daily       cetirizine (ZYRTEC) 10 MG tablet Take 10 mg by mouth daily       clobetasol (TEMOVATE) 0.05 % external cream Apply a fine layer to scalp in the morning after shampooing, a tube should last 2 months 60 g 1     clobetasol (TEMOVATE) 0.05 % ointment Apply a fine layer to the scalp at night after application of Rogaine, shampoo in am. A tube should last 2 months. 60 g 1     clobetasol propionate (CLOBEX) 0.05 % external shampoo APPLY TOPICALLY TO A DRY SCALP, LEAVE ON FOR 10-15 MINUTES, THEN LATHER AND RINSE. DO EVERY OTHER  mL 11     escitalopram (LEXAPRO) 20 MG tablet Take one tablet by mouth once daily with 10 mg tablet for total dose 30 mg.       EXEMESTANE PO Take by mouth daily       Fluocinolone Acetonide Scalp (DERMA-SMOOTHE/FS SCALP) 0.01 % OIL oil Apply topically once a week Use once a week. Apply 1-2 capfuls to dry scalp, massage in and leave on overnight, wash out in the morning 1 Bottle 11     fluocinonide (LIDEX) 0.05 % external solution Use to treat any areas on the scalp that are itchy as needed. Use twice daily to any areas of complete hair loss. 60 mL 11     ketoconazole (NIZORAL) 2 % shampoo Apply to scalp, lather, then rinse, do every other day alternating with Head & Shoulders 120 mL 5     levothyroxine (SYNTHROID, LEVOTHROID) 112 MCG tablet Take by mouth daily        montelukast (SINGULAIR) 10 MG tablet Take 10 mg by mouth At Bedtime       RANITIDINE HCL PO Take 150 mg by mouth 2 times daily       tacrolimus (PROTOPIC) 0.1 % ointment Apply topically 2 times daily 60 g 1     Cholecalciferol (VITAMIN D) 1000 UNITS capsule Take 5,000 Units by mouth daily       ferrous gluconate (FERGON) 324 (38 FE) MG tablet One tablet daily (Patient not taking: Reported on 10/17/2018) 60 tablet 1     omalizumab (XOLAIR) 150 MG injection Inject 300 mg Subcutaneous       triamcinolone acetonide (KENALOG) 10 MG/ML injection See med note (Patient not taking: Reported on 10/17/2018) 5 mL 0     Allergies   Allergen Reactions     Acetaminophen Hives     Penicillins Hives     Shellfish Allergy GI Disturbance     Silver Nitrate Hives     tegaderm adhesive dressing      Sulfa Drugs Hives     Vicodin [Hydrocodone-Acetaminophen] Hives       Review of Systems:  -As per HPI  -Constitutional: Otherwise feeling well today, in usual state of health.  -Skin: As above in HPI. No additional skin concerns.    Physical exam:  Vitals: /77 (BP Location: Left arm, Patient Position: Chair, Cuff Size: Adult Regular)   Pulse 87   GEN: This is a well developed, well-nourished female in no acute distress, in a pleasant mood.    SKIN: Focused examination of the face scalp and neck  was performed.  -The eyebrows are preserved, eyelashes are preserved, and nails are within normal limits. No pitting or onycholysis.   -Diffuse thinning of scalp hair globally   -Patchy alopecia mainly on the left frontal and left parietal scalp, ophiasis scalp   -Negative hair pull test   -No other lesions of concern on areas examined.     Impression/Plan:  1. Alopecia areata - stable. Hx of seborrheic dermatitis (well controlled)    Reduce clobetasol 0.05% shampoo to 2x per week.    Continue rogaine 5% foam to daily, goal of one canister per month.    Continue using dermasmoothe/FS 0.01% oil once per week.    Continue clobetasol cream to new  areas of hair loss     Ketoconazole and tofactinib not needed at this time  Kenalog intralesional injection procedure note: After verbal consent and discussion of risks including but not limited to atrophy, pain, and bruising, time out was performed, the patient underwent positioning, 2 total cc of Kenalog 10 mg/cc was injected into >7 sites on the scalp.  The patient tolerated the procedure well and left the Dermatology clinic in good condition.      Photodocumentation completed     2. Hx of Non-specific urticarial appearing eruption of face and arms:    Appears to be associated with ILK injections. Discussed that this has occurred with other patients. Patient will continue to monitor for reaction, if this happens again she will take photos and send via [employer].    On Zyrtec due to seasonal allergies, can take 1 to 2 additional antihistamines at time of reaction should it occur again        [facility] [place] [phone] on close of this encounter.    Follow-up in 3-4 months, earlier for new or changing lesions.     Staffed with Dr. Brown    Staff Involved:  Resident(Lai Padilla)/Staff(as above)    Lai Padilla MD  Dermatology Resident, PGY4    Patient was seen and examined with the dermatology resident. I agree with the history, review of systems, physical examination, assessments and plan. ILK injections were done together.    Nita Brown MD  Professor and  Chair  Department of Dermatology  [facility]

## 2019-08-21 ENCOUNTER — TELEPHONE (OUTPATIENT)
Dept: DERMATOLOGY | Facility: CLINIC | Age: 64
End: 2019-08-21

## 2019-08-21 NOTE — TELEPHONE ENCOUNTER
Health Call Center    Phone Message    May a detailed message be left on voicemail: yes    Reason for Call: Symptoms or Concerns     If patient has red-flag symptoms, warm transfer to triage line    Current symptom or concern: Pt is suffering a severe and sudden hair loss.  Significantly worse than usual.  Please call ASAP to assess or if possible to offer an urgent appt.    Symptoms have been present for:  2 day(s)    Has patient previously been seen for this? Yes    By Nita Brown MD        Action Taken: Message routed to:  [facility] ([facility] dermatology'

## 2019-08-21 NOTE — TELEPHONE ENCOUNTER
"Patient states that there has been \"dramatic thinning\" of her \"patches\" in the last two weeks. Scheduled for follow up In continuity clinic on 8/22/19.   "

## 2019-08-22 ENCOUNTER — OFFICE VISIT (OUTPATIENT)
Dept: DERMATOLOGY | Facility: CLINIC | Age: 64
End: 2019-08-22
Payer: COMMERCIAL

## 2019-08-22 DIAGNOSIS — L50.9 URTICARIA: ICD-10-CM

## 2019-08-22 DIAGNOSIS — L63.9 ALOPECIA AREATA: Primary | ICD-10-CM

## 2019-08-22 ASSESSMENT — PAIN SCALES - GENERAL: PAINLEVEL: NO PAIN (0)

## 2019-08-22 NOTE — LETTER
"8/22/2019       RE: Maria C Goodman  [address] [place] [address]     Dear Colleague,    Thank you for referring your patient, Maria C Goodman, to the [facility] DERMATOLOGY at [facility]. Please see a copy of my visit note below.    [facility] Dermatology Note    Continuity clinic patient: Paulo/Suresh    Dermatology Problem List:  1. Alopecia Areata:  -Current Tx: Rogaine 5% 4x/wk, clobetasol 0.05% shampoo 2x/w, dermasmoothe FS oil 1x/w  -last ILK 10 on 7/2/19  2. Hand Dermatitis  3. Urticaria, cold aggravated/induced  -Current Tx: Xolair  4. Solar lentigines   -tretinoin 0.1% cream        Encounter Date: Aug 22, 2019    CC:   Chief Complaint   Patient presents with     Hair Loss     Maria C is here today for a hair loss follow up. Maria C notes\" Im noticing increased thinning \"         History of Present Illness:  Ms. Maria C Goodman is a 64 year old female who presents as a follow-up for hair loss. The patient was last seen 7/2/2019 when she repeated ILK. Today, she feels that over the last 2-3 weeks she has really had a large increase in hair loss. She notices increased hair loss with combing her hair. She feels like her occipital scalp and parietal scalp have the most active areas of hair loss. She is using clobetasol shampoo BIW to three times per week. She uses fluocinolone oil once a week and she has been using minoxidil foam 3-4 times a week. She reports that her scalp is no longer itchy or tingly, which it has been in the past.     She has had a history of an urticarial type reaction from her neck up in response to ILK. After her last ILK on 7/2/2019, she reports that she had a little bit of redness in her cheeks which felt hot. It lasted for roughly 2 days. She would like to have ILK again today.     She is otherwise doing well and denies fatigue.    Past Medical History:   Patient Active Problem List   Diagnosis     Alopecia areata     " Dermatitis     Hypertrichosis     Urticaria     Autoimmune disease (H)     Dermatitis, seborrheic     Past Medical History:   Diagnosis Date     Asthma      Hypothyroidism      Lobular breast cancer (H)     s/p chemotherapy and radiation, in remission     Multiple allergies      No past surgical history on file.    Social History:  Patient reports that she has never smoked. She has never used smokeless tobacco.    Family History:  Family History   Problem Relation Age of Onset     Rheumatoid Arthritis Mother      Cancer No family hx of         skin cancer     Skin Cancer No family hx of         no skin cancer       Medications:  Current Outpatient Medications   Medication Sig Dispense Refill     ALBUTEROL IN Inhale into the lungs daily as needed       Calcium Carbonate-Vitamin D (CALCIUM + D PO) Take by mouth daily       cetirizine (ZYRTEC) 10 MG tablet Take 10 mg by mouth daily       Cholecalciferol (VITAMIN D) 1000 UNITS capsule Take 5,000 Units by mouth daily       clobetasol (TEMOVATE) 0.05 % external cream Apply a fine layer to scalp in the morning after shampooing, a tube should last 2 months 60 g 1     clobetasol (TEMOVATE) 0.05 % ointment Apply a fine layer to the scalp at night after application of Rogaine, shampoo in am. A tube should last 2 months. 60 g 1     clobetasol propionate (CLOBEX) 0.05 % external shampoo APPLY TOPICALLY TO A DRY SCALP, LEAVE ON FOR 10-15 MINUTES, THEN LATHER AND RINSE. DO EVERY OTHER  mL 11     escitalopram (LEXAPRO) 20 MG tablet Take one tablet by mouth once daily with 10 mg tablet for total dose 30 mg.       EXEMESTANE PO Take by mouth daily       Fluocinolone Acetonide Scalp (DERMA-SMOOTHE/FS SCALP) 0.01 % OIL oil Apply topically once a week Use once a week. Apply 1-2 capfuls to dry scalp, massage in and leave on overnight, wash out in the morning 1 Bottle 11     fluocinonide (LIDEX) 0.05 % external solution Use to treat any areas on the scalp that are itchy as needed.  Use twice daily to any areas of complete hair loss. 60 mL 11     ketoconazole (NIZORAL) 2 % shampoo Apply to scalp, lather, then rinse, do every other day alternating with Head & Shoulders 120 mL 5     levothyroxine (SYNTHROID, LEVOTHROID) 112 MCG tablet Take by mouth daily       montelukast (SINGULAIR) 10 MG tablet Take 10 mg by mouth At Bedtime       RANITIDINE HCL PO Take 150 mg by mouth 2 times daily       tacrolimus (PROTOPIC) 0.1 % ointment Apply topically 2 times daily 60 g 1     ferrous gluconate (FERGON) 324 (38 FE) MG tablet One tablet daily (Patient not taking: Reported on 10/17/2018) 60 tablet 1     omalizumab (XOLAIR) 150 MG injection Inject 300 mg Subcutaneous       triamcinolone acetonide (KENALOG) 10 MG/ML injection See med note (Patient not taking: Reported on 10/17/2018) 5 mL 0        Allergies   Allergen Reactions     Acetaminophen Hives     Penicillins Hives     Shellfish Allergy GI Disturbance     Silver Nitrate Hives     tegaderm adhesive dressing      Sulfa Drugs Hives     Vicodin [Hydrocodone-Acetaminophen] Hives         Review of Systems:  -As per HPI  -Constitutional: Otherwise feeling well today, in usual state of health.  -HEENT: Patient denies nonhealing oral sores.  -Skin: As above in HPI. No additional skin concerns.    Physical exam:  Vitals: There were no vitals taken for this visit.  GEN: This is a well developed, well-nourished female in no acute distress, in a pleasant mood.    SKIN: Focused examination of the scalp, face, neck, and hands was performed.  -Kim skin type: II  -The eyebrows are preserved, eyelashes are preserved, and nails are within normal limits. No pitting or onycholysis.   -Diffuse thinning of scalp hair globally   -Patchy alopecia mainly on the left frontal and left parietal scalp, ophiasis scalp   -Negative hair pull test   -No other lesions of concern on areas examined.        Impression/Plan:  1. Alopecia areata: stable with treatments.     Kenalog  intralesional injection procedure note: After verbal consent and discussion of risks including but not limited to atrophy, pain, and bruising, time out was performed, the patient underwent positioning and the area was prepped with isopropyl alcohol, 3 total cc of Kenalog 10 mg/cc was injected into 30 sites throughout the scalp. The patient tolerated the procedure well and left the Dermatology clinic in good condition. (if patient develops rash following ILK, she is to take photos and upload to her mychart)    Continue clobetasol 0.05% shampoo two to three times weekly    Continue fluocinolone 0.01% oil once weekly    Increase minoxidil 5% foam to daily    Photos taken today and placed in chart    2. Hx of Non-specific urticarial appearing eruption of face and arms:    Appears to be associated with ILK injections. Discussed that this has occurred with other patients. Patient will continue to monitor for reaction, if this happens again she will take photos and send via mychart.    On Zyrtec due to seasonal allergies, can take 1 to 2 additional antihistamines at time of reaction should it occur again      Follow-up in 6 weeks, earlier for new or changing lesions.       Dr. Brown staffed the patient.    Staff Involved:  Resident(Paulo)/Staff    Harrison Bates MD, PhD  Medicine-Dermatology PGY-4    Patient was seen and examined with the medicine/dermatology resident. I agree with the history, review of systems, physical examination, assessments and plan. I was present for the key part of the ILK injections.     Nita Brown MD  Professor and  Chair  Department of Dermatology  [facility]

## 2019-08-22 NOTE — NURSING NOTE
"Dermatology Rooming Note    Maria C Goodman's goals for this visit include:   Chief Complaint   Patient presents with     Hair Loss     Maria C is here today for a hair loss follow up. Maria C notes\" Im noticing increased thinning \"     AMANDA Izquierdo    "

## 2019-08-22 NOTE — PROGRESS NOTES
"[facility] Dermatology Note    Continuity clinic patient: Paulo/Suresh    Dermatology Problem List:  1. Alopecia Areata:  -Current Tx: Rogaine 5% 4x/wk, clobetasol 0.05% shampoo 2x/w, dermasmoothe FS oil 1x/w  -last ILK 10 on 7/2/19  2. Hand Dermatitis  3. Urticaria, cold aggravated/induced  -Current Tx: Xolair  4. Solar lentigines   -tretinoin 0.1% cream        Encounter Date: Aug 22, 2019    CC:   Chief Complaint   Patient presents with     Hair Loss     Maria C is here today for a hair loss follow up. Maria C notes\" Im noticing increased thinning \"         History of Present Illness:  Ms. Maria C Goodman is a 64 year old female who presents as a follow-up for hair loss. The patient was last seen 7/2/2019 when she repeated ILK. Today, she feels that over the last 2-3 weeks she has really had a large increase in hair loss. She notices increased hair loss with combing her hair. She feels like her occipital scalp and parietal scalp have the most active areas of hair loss. She is using clobetasol shampoo BIW to three times per week. She uses fluocinolone oil once a week and she has been using minoxidil foam 3-4 times a week. She reports that her scalp is no longer itchy or tingly, which it has been in the past.     She has had a history of an urticarial type reaction from her neck up in response to ILK. After her last ILK on 7/2/2019, she reports that she had a little bit of redness in her cheeks which felt hot. It lasted for roughly 2 days. She would like to have ILK again today.     She is otherwise doing well and denies fatigue.    Past Medical History:   Patient Active Problem List   Diagnosis     Alopecia areata     Dermatitis     Hypertrichosis     Urticaria     Autoimmune disease (H)     Dermatitis, seborrheic     Past Medical History:   Diagnosis Date     Asthma      Hypothyroidism      Lobular breast cancer (H)     s/p chemotherapy and radiation, in remission     Multiple allergies      No " past surgical history on file.    Social History:  Patient reports that she has never smoked. She has never used smokeless tobacco.    Family History:  Family History   Problem Relation Age of Onset     Rheumatoid Arthritis Mother      Cancer No family hx of         skin cancer     Skin Cancer No family hx of         no skin cancer       Medications:  Current Outpatient Medications   Medication Sig Dispense Refill     ALBUTEROL IN Inhale into the lungs daily as needed       Calcium Carbonate-Vitamin D (CALCIUM + D PO) Take by mouth daily       cetirizine (ZYRTEC) 10 MG tablet Take 10 mg by mouth daily       Cholecalciferol (VITAMIN D) 1000 UNITS capsule Take 5,000 Units by mouth daily       clobetasol (TEMOVATE) 0.05 % external cream Apply a fine layer to scalp in the morning after shampooing, a tube should last 2 months 60 g 1     clobetasol (TEMOVATE) 0.05 % ointment Apply a fine layer to the scalp at night after application of Rogaine, shampoo in am. A tube should last 2 months. 60 g 1     clobetasol propionate (CLOBEX) 0.05 % external shampoo APPLY TOPICALLY TO A DRY SCALP, LEAVE ON FOR 10-15 MINUTES, THEN LATHER AND RINSE. DO EVERY OTHER  mL 11     escitalopram (LEXAPRO) 20 MG tablet Take one tablet by mouth once daily with 10 mg tablet for total dose 30 mg.       EXEMESTANE PO Take by mouth daily       Fluocinolone Acetonide Scalp (DERMA-SMOOTHE/FS SCALP) 0.01 % OIL oil Apply topically once a week Use once a week. Apply 1-2 capfuls to dry scalp, massage in and leave on overnight, wash out in the morning 1 Bottle 11     fluocinonide (LIDEX) 0.05 % external solution Use to treat any areas on the scalp that are itchy as needed. Use twice daily to any areas of complete hair loss. 60 mL 11     ketoconazole (NIZORAL) 2 % shampoo Apply to scalp, lather, then rinse, do every other day alternating with Head & Shoulders 120 mL 5     levothyroxine (SYNTHROID, LEVOTHROID) 112 MCG tablet Take by mouth daily        montelukast (SINGULAIR) 10 MG tablet Take 10 mg by mouth At Bedtime       RANITIDINE HCL PO Take 150 mg by mouth 2 times daily       tacrolimus (PROTOPIC) 0.1 % ointment Apply topically 2 times daily 60 g 1     ferrous gluconate (FERGON) 324 (38 FE) MG tablet One tablet daily (Patient not taking: Reported on 10/17/2018) 60 tablet 1     omalizumab (XOLAIR) 150 MG injection Inject 300 mg Subcutaneous       triamcinolone acetonide (KENALOG) 10 MG/ML injection See med note (Patient not taking: Reported on 10/17/2018) 5 mL 0        Allergies   Allergen Reactions     Acetaminophen Hives     Penicillins Hives     Shellfish Allergy GI Disturbance     Silver Nitrate Hives     tegaderm adhesive dressing      Sulfa Drugs Hives     Vicodin [Hydrocodone-Acetaminophen] Hives         Review of Systems:  -As per HPI  -Constitutional: Otherwise feeling well today, in usual state of health.  -HEENT: Patient denies nonhealing oral sores.  -Skin: As above in HPI. No additional skin concerns.    Physical exam:  Vitals: There were no vitals taken for this visit.  GEN: This is a well developed, well-nourished female in no acute distress, in a pleasant mood.    SKIN: Focused examination of the scalp, face, neck, and hands was performed.  -Kim skin type: II  -The eyebrows are preserved, eyelashes are preserved, and nails are within normal limits. No pitting or onycholysis.   -Diffuse thinning of scalp hair globally   -Patchy alopecia mainly on the left frontal and left parietal scalp, ophiasis scalp   -Negative hair pull test   -No other lesions of concern on areas examined.        Impression/Plan:  1. Alopecia areata: stable with treatments.     Kenalog intralesional injection procedure note: After verbal consent and discussion of risks including but not limited to atrophy, pain, and bruising, time out was performed, the patient underwent positioning and the area was prepped with isopropyl alcohol, 3 total cc of Kenalog 10 mg/cc  was injected into 30 sites throughout the scalp. The patient tolerated the procedure well and left the Dermatology clinic in good condition. (if patient develops rash following ILK, she is to take photos and upload to her mychart)    Continue clobetasol 0.05% shampoo two to three times weekly    Continue fluocinolone 0.01% oil once weekly    Increase minoxidil 5% foam to daily    Photos taken today and placed in chart    2. Hx of Non-specific urticarial appearing eruption of face and arms:    Appears to be associated with ILK injections. Discussed that this has occurred with other patients. Patient will continue to monitor for reaction, if this happens again she will take photos and send via mychart.    On Zyrtec due to seasonal allergies, can take 1 to 2 additional antihistamines at time of reaction should it occur again      Follow-up in 6 weeks, earlier for new or changing lesions.       Dr. Brown staffed the patient.    Staff Involved:  Resident(Paulo)/Staff    Harrison Bates MD, PhD  Medicine-Dermatology PGY-4    Patient was seen and examined with the medicine/dermatology resident. I agree with the history, review of systems, physical examination, assessments and plan. I was present for the key part of the ILK injections.     Nita Brown MD  Professor and  Chair  Department of Dermatology  [facility]

## 2019-09-23 NOTE — LETTER
10/17/2018       RE: Maria C Goodman   [address] [place] [address]     Dear Colleague,    Thank you for referring your patient, Maria C Goodman, to the [facility] DERMATOLOGY at [facility]. Please see a copy of my visit note below.    [facility] Dermatology Note      Dermatology Problem List:  1. Alopecia Areata,   -s/p ILK  10  -Current Tx: Rogaine, ketoconazole, clobetasol 0.05% shampoo, dermasmoothe FS oil   2. Hand Dermatitis  3. Urticaria, cold aggravated/induced  -Current Tx: Xolair  4. Solar lentigines   -tretinoin 0.1% cream     Encounter Date: Oct 17, 2018  Last Seen: 2017    CC:  Chief Complaint   Patient presents with     Hair Loss     Maria C is here for a follow up hairloss       History of Present Illness:  Ms. Maria C Goodman is a 63 year old female who presents as a follow-up for alopecia areata. The patient was last seen in the dermatology clinic on 18 during which 4.0 ml of ILK-10 were injected into 40 sites on the scalp for her alopecia areata.     Today she reports that the hair on the top of her head is much thinner. She notes it is getting more challenging to cover her spots of alopecia. She denies losing a lot of hair all at the same time, as it is a more gradual and persistent hair loss. She also reports that her scalp as been itchier than it has previously been. She also would like to discuss her medications today, as she has several she has questions about. She is still using her clobetasol shampoo and dermasmooth oil consistently. She does not use the clobetasol 0.05% cream and fluocinonide 0.05% solution regularly and the bottles she does have are .     Overall she reports again that her skin is very dry- possibly due to the change in weather. Otherwise the patient reports no additional painful, bleeding, nonhealing or pruritic lesions and denies any new or changing moles.    Past Medical History:   Patient  Dez Wells 1968 male MRN: [id number]      ASSESSMENT/PLAN  Problem List Items Addressed This Visit        Musculoskeletal and Integument    Psoriasis - Primary    Relevant Medications    halobetasol (ULTRAVATE) 0 05 % cream      Other Visit Diagnoses     Rash        Relevant Medications    halobetasol (ULTRAVATE) 0 05 % cream        Will send refill of steroid cream and monitor for improvement with continued use  If no change, consider could consider Vit D analogue vs systemic therapy  Of note, pt's BP was slightly elevated today  Will repeat at next visit and if persistently elevated, consider anti-hypertensive  Future Appointments   Date Time Provider Shaun Velasquez   9/23/2019 11:20 AM DO CARLOS MANUEL [facility]   10/21/2019 10:20 AM DO CARLOS MANUEL [facility]          SUBJECTIVE  CC: Blood Pressure Check      HPI:  Dez Wells is a 54 y o  male who presents for a follow up of psoriasis  Pt states the cream started to help, as he noticed less itching/flakiness  However, the amount of cream provided did not last very long, and so he has not used in a few weeks  Denies any adverse effects of cream    Denies any oozing, bleeding associated with lesions  Review of Systems   Constitutional: Negative for fever  Skin: Positive for rash  Historical Information   The patient history was reviewed and updated as follows:    Past Medical History:   Diagnosis Date    Chest pain 9/25/2014    Eczema     Hidradenitis suppurativa 2/14/2018    Rib pain 1/28/2015     No past surgical history on file    Family History   Problem Relation Age of Onset    Mental illness Mother         denied    Substance Abuse Mother         denied    Mental illness Father         denied    Substance Abuse Father         denied    Heart disease Father         cardiac disorder      Social History   Social History     Substance and Sexual Activity   Alcohol Use Yes    Comment: Active Problem List   Diagnosis     Alopecia areata     Dermatitis     Hypertrichosis     Urticaria     Autoimmune disease (H)     Dermatitis, seborrheic     Past Medical History:   Diagnosis Date     Asthma      Hypothyroidism      Lobular breast cancer (H)     s/p chemotherapy and radiation, in remission     Multiple allergies      History reviewed. No pertinent surgical history.      Medications:  No recent changes in medications.  Current Outpatient Prescriptions   Medication Sig Dispense Refill     ALBUTEROL IN Inhale into the lungs daily as needed       Calcium Carbonate-Vitamin D (CALCIUM + D PO) Take by mouth daily       cetirizine (ZYRTEC) 10 MG tablet Take 10 mg by mouth daily       Cholecalciferol (VITAMIN D) 1000 UNITS capsule Take 5,000 Units by mouth daily       clobetasol (TEMOVATE) 0.05 % cream Apply a fine layer to scalp in the morning after shampooing, a tube should last 2 months 60 g 1     clobetasol (TEMOVATE) 0.05 % ointment Apply a fine layer to the scalp at night after application of Rogaine, shampoo in am. A tube should last 2 months. 60 g 1     clobetasol propionate 0.05 % SHAM APPLY TOPICALLY TO A DRY SCALP, LEAVE ON FOR 10-15 MINUTES, THEN LATHER AND RINSE. DO EVERY OTHER  mL 0     escitalopram (LEXAPRO) 20 MG tablet Take one tablet by mouth once daily with 10 mg tablet for total dose 30 mg.       EXEMESTANE PO Take by mouth daily       ferrous gluconate (FERGON) 324 (38 FE) MG tablet One tablet daily (Patient not taking: Reported on 10/17/2018) 60 tablet 1     Fluocinolone Acetonide Scalp (DERMA-SMOOTHE/FS SCALP) 0.01 % OIL oil Apply topically once a week Use once a week. Apply 1-2 capfuls to dry scalp, massage in and leave on overnight, wash out in the morning 1 Bottle 3     fluocinonide (LIDEX) 0.05 % solution Use to treat any areas on the scalp that are itchy as needed. Use twice daily to any areas of complete hair loss. 60 mL 3     ketoconazole (NIZORAL) 2 % shampoo Apply to  social; moderate     Social History     Substance and Sexual Activity   Drug Use No     Social History     Tobacco Use   Smoking Status Current Every Day Smoker    Packs/day: 1 00    Years: 20 00    Pack years: 20 00   Smokeless Tobacco Former User       Medications:     Current Outpatient Medications:     halobetasol (ULTRAVATE) 0 05 % cream, Apply topically once for 1 dose, Disp: 200 g, Rfl: 5  No Known Allergies    OBJECTIVE    Vitals:   Vitals:    09/23/19 1041   BP: 148/86   Pulse: 72   Temp: 97 8 °F (36 6 °C)   SpO2: 96%   Weight: 117 kg (257 lb)   Height: 6' 2" (1 88 m)           Physical Exam   Constitutional: He appears well-developed and well-nourished  No distress  HENT:   Head: Normocephalic and atraumatic  Pulmonary/Chest: Effort normal    Neurological: He is alert  Skin:   Psoriatic plaques overall similar to previous, those decreased dryness noted   Psychiatric: He has a normal mood and affect  Vitals reviewed  DO Nakia Zamorano [facility]   9/23/2019  10:57 AM      BMI Counseling: Body mass index is 33 kg/m²  The BMI is above normal  Nutrition recommendations include decreasing overall calorie intake, 3-5 servings of fruits/vegetables daily, reducing fast food intake, consuming healthier snacks, decreasing soda and/or juice intake and moderation in carbohydrate intake  Exercise recommendations include exercising 3-5 times per week  scalp, lather, then rinse, do every other day alternating with Head & Shoulders 120 mL 5     levothyroxine (SYNTHROID, LEVOTHROID) 112 MCG tablet Take by mouth daily       montelukast (SINGULAIR) 10 MG tablet Take 10 mg by mouth At Bedtime       omalizumab (XOLAIR) 150 MG injection Inject 300 mg Subcutaneous       RANITIDINE HCL PO Take 150 mg by mouth 2 times daily       tacrolimus (PROTOPIC) 0.1 % ointment Apply topically 2 times daily 60 g 1     triamcinolone acetonide (KENALOG) 10 MG/ML injection See med note (Patient not taking: Reported on 10/17/2018) 5 mL 0     Allergies   Allergen Reactions     Acetaminophen Hives     Penicillins Hives     Shellfish Allergy GI Disturbance     Silver Nitrate Hives     tegaderm adhesive dressing      Sulfa Drugs Hives     Vicodin [Hydrocodone-Acetaminophen] Hives       Review of Systems:  - Constitutional: The patient is feeling generally well   - Skin: As above in HPI. No additional skin concerns.    Physical exam:  Vitals: There were no vitals taken for this visit.  GEN: This is a well developed, well-nourished female in no acute distress, in a pleasant mood.    SKIN: Focused examination of the face, scalp and hands was performed. Significant for:     - Regrowth noted just superior to the right ear, new area on the left lower occipital scalp and right lower occipital scalp    -Large patch of alopecia to occipital scalp 5 cm, with 2 adjacent 2 cm patches, regrowth noted throughout   -Scattered patches of alopecia throughout the scalp, mainly to the mid frontal, left and right parietal scalp have improved.   - Eyebrows within normal limits   - Negative hair pull test   - Minimal scale to the scalp.   - No other lesions of concern on areas examined.    Impression/Plan:  1. Alopecia areata- waxing and waning. Hx of seborrheic dermatitis (well controlled)    Continue clobetasol 0.05% shampoo 1x per week. Refill provided.    Continue ketoconazole 1-2x per week.     Continue  rogaine 5% foam 3x per week.    Continue using dermasmoothe/FS 0.01% oil once per week. Refill provided.     Continue fluocinonide 0.05% solution, apply to areas of the scalp that are itchy as needed. Use BID to any areas of complete hair loss.   Kenalog intralesional injection procedure note: After verbal consent and discussion of risks including but not limited to atrophy, pain, and bruising, time out was performed, the patient underwent positioning, 4.0  total cc of Kenalog 10 mg/cc was injected in 40 sites on the scalp. The patient tolerated the procedure well and left the Dermatology clinic in good condition.   Pt would like to follow up with Dr. Brown to discuss alternative options for topical and/or systemic treatments.     Photodocumentation taken for future reference.    Follow-up in 12 weeks with Dr. Brown, earlier for new or changing lesions.     Staff involved:  Scribe/Staff    Scribe Disclosure:   Monik EWING, am serving as a scribe to document services personally performed by Alyce Logan PA-C, based on data collection and the provider's statements to me.    Provider Disclosure:   The documentation recorded by the scribe accurately reflects the services I personally performed and the decisions made by me.    All risks, benefits and alternatives were discussed with patient.  Patient is in agreement and understands the assessment and plan.  All questions were answered.  Sun Screen Education was given.   Return to Clinic in 3 months or sooner as needed.   Alyce Logan PA-C   [facility]

## 2019-10-03 ENCOUNTER — OFFICE VISIT (OUTPATIENT)
Dept: DERMATOLOGY | Facility: CLINIC | Age: 64
End: 2019-10-03
Payer: COMMERCIAL

## 2019-10-03 DIAGNOSIS — L63.9 ALOPECIA AREATA: ICD-10-CM

## 2019-10-03 DIAGNOSIS — L73.9 FOLLICULITIS: Primary | ICD-10-CM

## 2019-10-03 DIAGNOSIS — L50.9 URTICARIA: ICD-10-CM

## 2019-10-03 RX ORDER — CLINDAMYCIN PHOSPHATE 10 UG/ML
LOTION TOPICAL 2 TIMES DAILY
Qty: 60 ML | Refills: 1 | Status: SHIPPED | OUTPATIENT
Start: 2019-10-03 | End: 2023-04-11 | Stop reason: ALTCHOICE

## 2019-10-03 ASSESSMENT — PAIN SCALES - GENERAL: PAINLEVEL: NO PAIN (0)

## 2019-10-03 NOTE — PROGRESS NOTES
"[facility] Dermatology Note      Dermatology Problem List:  1. Alopecia Areata:  -Current Tx: Rogaine 5% 4x/wk, clobetasol 0.05% shampoo 2x/w, dermasmoothe FS oil 1x/w  ILK today  2. Hand Dermatitis  3. Urticaria, cold aggravated/induced  -Current Tx: Xolair  4. Solar lentigines   -tretinoin 0.1% cream   5. Mild folliculitis posterior scalp  -Clindamycin 1% lotion BID    Encounter Date: Oct 3, 2019    CC:   Chief Complaint   Patient presents with     Hair Loss     Maria C is here today for a hair loss follow up. Maria C notes\" Im not doing great\"          History of Present Illness:  Ms. Maria C Goodman is a 64 year old female who presents as a follow-up for hair loss. The patient was last seen 8/22/19 when she had ILK done to 30 sites.   She feels like her hair loss has worsened--particularly over the back. She started wearing a wig again.  In general she follows this regimen--and has used more frequently since the last visit.  Rogaine 5% 4x/wk, clobetasol 0.05% shampoo 2x/w, dermasmoothe FS oil 1x/w.  No change in eyebrows/eyelashes.  Denies scalp itchiness/pain.  No other areas of concern today and she feels overall well. Would like repeat ILK today. States for the last 3 sessions, the next day she develops a transient erythema on her cheeks which is asymptomatic and resolves in 24 hrs.      Past Medical History:   Patient Active Problem List   Diagnosis     Alopecia areata     Dermatitis     Hypertrichosis     Urticaria     Autoimmune disease (H)     Dermatitis, seborrheic     Past Medical History:   Diagnosis Date     Asthma      Hypothyroidism      Lobular breast cancer (H)     s/p chemotherapy and radiation, in remission     Multiple allergies      No past surgical history on file.    Social History:  Patient reports that she has never smoked. She has never used smokeless tobacco.    Family History:  Family History   Problem Relation Age of Onset     Rheumatoid Arthritis Mother      Cancer No family " hx of         skin cancer     Skin Cancer No family hx of         no skin cancer       Medications:  Current Outpatient Medications   Medication Sig Dispense Refill     ALBUTEROL IN Inhale into the lungs daily as needed       Calcium Carbonate-Vitamin D (CALCIUM + D PO) Take by mouth daily       cetirizine (ZYRTEC) 10 MG tablet Take 10 mg by mouth daily       Cholecalciferol (VITAMIN D) 1000 UNITS capsule Take 5,000 Units by mouth daily       clobetasol (TEMOVATE) 0.05 % external cream Apply a fine layer to scalp in the morning after shampooing, a tube should last 2 months 60 g 1     clobetasol (TEMOVATE) 0.05 % ointment Apply a fine layer to the scalp at night after application of Rogaine, shampoo in am. A tube should last 2 months. 60 g 1     clobetasol propionate (CLOBEX) 0.05 % external shampoo APPLY TOPICALLY TO A DRY SCALP, LEAVE ON FOR 10-15 MINUTES, THEN LATHER AND RINSE. DO EVERY OTHER  mL 11     escitalopram (LEXAPRO) 20 MG tablet Take one tablet by mouth once daily with 10 mg tablet for total dose 30 mg.       EXEMESTANE PO Take by mouth daily       Fluocinolone Acetonide Scalp (DERMA-SMOOTHE/FS SCALP) 0.01 % OIL oil Apply topically once a week Use once a week. Apply 1-2 capfuls to dry scalp, massage in and leave on overnight, wash out in the morning 1 Bottle 11     fluocinonide (LIDEX) 0.05 % external solution Use to treat any areas on the scalp that are itchy as needed. Use twice daily to any areas of complete hair loss. 60 mL 11     ketoconazole (NIZORAL) 2 % shampoo Apply to scalp, lather, then rinse, do every other day alternating with Head & Shoulders 120 mL 5     levothyroxine (SYNTHROID, LEVOTHROID) 112 MCG tablet Take by mouth daily       montelukast (SINGULAIR) 10 MG tablet Take 10 mg by mouth At Bedtime       RANITIDINE HCL PO Take 150 mg by mouth 2 times daily       tacrolimus (PROTOPIC) 0.1 % ointment Apply topically 2 times daily 60 g 1     ferrous gluconate (FERGON) 324 (38 FE) MG  tablet One tablet daily (Patient not taking: Reported on 10/17/2018) 60 tablet 1     omalizumab (XOLAIR) 150 MG injection Inject 300 mg Subcutaneous       triamcinolone acetonide (KENALOG) 10 MG/ML injection See med note (Patient not taking: Reported on 10/17/2018) 5 mL 0        Allergies   Allergen Reactions     Acetaminophen Hives     Penicillins Hives     Shellfish Allergy GI Disturbance     Silver Nitrate Hives     tegaderm adhesive dressing      Sulfa Drugs Hives     Vicodin [Hydrocodone-Acetaminophen] Hives         Review of Systems:  -As per HPI  -Constitutional: Otherwise feeling well today, in usual state of health.  -HEENT: Patient denies nonhealing oral sores.  -Skin: As above in HPI. No additional skin concerns.    Physical exam:  Vitals: There were no vitals taken for this visit.  GEN: This is a well developed, well-nourished female in no acute distress, in a pleasant mood.    SKIN: Focused examination of the face and scalp was performed.  -Kim skin type: I  -Hair pull test negative  -Multiple patches of alopecia on the scalp, particularly occipital scalp. Patches of alopecia on crown/vertex and parietal scalp, but there are thin strands of hair in these patches. Overall mildly improved. On the occipital scalp there are several pinpoint pustules.  -No other lesions of concern on areas examined.     Impression/Plan:  1. Alopecia areata: mildly improved overall with treatments.     Kenalog intralesional injection procedure note: After verbal consent and discussion of risks including but not limited to atrophy, pain, and bruising, time out was performed, the patient underwent positioning and the area was prepped with isopropyl alcohol, 3 total cc of Kenalog 10 mg/cc was injected into 30 sites throughout the scalp. The patient tolerated the procedure well and left the Dermatology clinic in good condition. (if patient develops rash following ILK, she is to take photos and upload to her  mychart)    Continue clobetasol 0.05% shampoo two to three times weekly    Continue Head&Shoulders shampoo    Continue fluocinolone 0.01% oil once weekly    Continue minoxidil 5% foam daily    Photos taken today and placed in chart    2. Hx of Non-specific urticarial appearing eruption of face and arms:    Appears to be associated with ILK injections. Discussed that this has occurred with other patients. Patient will continue to monitor for reaction, if this happens again she will take photos and send via [employer].  On Zyrtec due to seasonal allergies, can take 1 to 2 additional antihistamines at time of reaction should it occur again    3.   Mild folliculitis    Clindamycin 1% lotion BID--apply to posterior scalp    CC [facility]  [address] [phone] on close of this encounter.  Follow-up in 6 weeks, earlier for new or changing lesions.        staffed the patient.    Staff Involved:  Resident(Juan R Prince)/Staff    Patient was seen and examined with the medicine/dermatology resident. I agree with the history, review of systems, physical examination, assessments and plan. I was present for the key portions of the ILK procedures.    Nita Brown MD  Professor and  Chair  Department of Dermatology  [facility]

## 2019-10-03 NOTE — PROGRESS NOTES
Drug Administration Record    Prior to injection, verified patient identity using patient's name and date of birth.  Due to injection administration, patient instructed to remain in clinic for 15 minutes  afterwards, and to report any adverse reaction to me immediately.    Drug Name: triamcinolone acetonide(kenalog)  Dose: 3mL of triamcinolone 10mg/mL, 30mg dose  Route administered: ID  NDC #: [id number]: Kenalog-10 (3570-2509-50)  Amount of waste(mL):2mL  Reason for waste: Single use vial    LOT #: [id number]  SITE: [facility]  : [employer]  EXPIRATION DATE: 02/2021

## 2019-10-03 NOTE — NURSING NOTE
"Dermatology Rooming Note    Maria C Goodman's goals for this visit include:   Chief Complaint   Patient presents with     Hair Loss     Maria C is here today for a hair loss follow up. Maria C notes\" Im not doing great\"      AMANDA Izquierdo    "

## 2019-10-03 NOTE — LETTER
"10/3/2019       RE: Maria C Goodman  [address] [place] [address]     Dear Colleague,    Thank you for referring your patient, Maria C Goodman, to the [facility] DERMATOLOGY at [facility]. Please see a copy of my visit note below.    [facility] Dermatology Note      Dermatology Problem List:  1. Alopecia Areata:  -Current Tx: Rogaine 5% 4x/wk, clobetasol 0.05% shampoo 2x/w, dermasmoothe FS oil 1x/w  ILK today  2. Hand Dermatitis  3. Urticaria, cold aggravated/induced  -Current Tx: Xolair  4. Solar lentigines   -tretinoin 0.1% cream   5. Mild folliculitis posterior scalp  -Clindamycin 1% lotion BID    Encounter Date: Oct 3, 2019    CC:   Chief Complaint   Patient presents with     Hair Loss     Maria C is here today for a hair loss follow up. Maria C notes\" Im not doing great\"          History of Present Illness:  Ms. Maria C Goodman is a 64 year old female who presents as a follow-up for hair loss. The patient was last seen 8/22/19 when she had ILK done to 30 sites.   She feels like her hair loss has worsened--particularly over the back. She started wearing a wig again.  In general she follows this regimen--and has used more frequently since the last visit.  Rogaine 5% 4x/wk, clobetasol 0.05% shampoo 2x/w, dermasmoothe FS oil 1x/w.  No change in eyebrows/eyelashes.  Denies scalp itchiness/pain.  No other areas of concern today and she feels overall well. Would like repeat ILK today. States for the last 3 sessions, the next day she develops a transient erythema on her cheeks which is asymptomatic and resolves in 24 hrs.      Past Medical History:   Patient Active Problem List   Diagnosis     Alopecia areata     Dermatitis     Hypertrichosis     Urticaria     Autoimmune disease (H)     Dermatitis, seborrheic     Past Medical History:   Diagnosis Date     Asthma      Hypothyroidism      Lobular breast cancer (H)     s/p chemotherapy and radiation, in remission     " Multiple allergies      No past surgical history on file.    Social History:  Patient reports that she has never smoked. She has never used smokeless tobacco.    Family History:  Family History   Problem Relation Age of Onset     Rheumatoid Arthritis Mother      Cancer No family hx of         skin cancer     Skin Cancer No family hx of         no skin cancer       Medications:  Current Outpatient Medications   Medication Sig Dispense Refill     ALBUTEROL IN Inhale into the lungs daily as needed       Calcium Carbonate-Vitamin D (CALCIUM + D PO) Take by mouth daily       cetirizine (ZYRTEC) 10 MG tablet Take 10 mg by mouth daily       Cholecalciferol (VITAMIN D) 1000 UNITS capsule Take 5,000 Units by mouth daily       clobetasol (TEMOVATE) 0.05 % external cream Apply a fine layer to scalp in the morning after shampooing, a tube should last 2 months 60 g 1     clobetasol (TEMOVATE) 0.05 % ointment Apply a fine layer to the scalp at night after application of Rogaine, shampoo in am. A tube should last 2 months. 60 g 1     clobetasol propionate (CLOBEX) 0.05 % external shampoo APPLY TOPICALLY TO A DRY SCALP, LEAVE ON FOR 10-15 MINUTES, THEN LATHER AND RINSE. DO EVERY OTHER  mL 11     escitalopram (LEXAPRO) 20 MG tablet Take one tablet by mouth once daily with 10 mg tablet for total dose 30 mg.       EXEMESTANE PO Take by mouth daily       Fluocinolone Acetonide Scalp (DERMA-SMOOTHE/FS SCALP) 0.01 % OIL oil Apply topically once a week Use once a week. Apply 1-2 capfuls to dry scalp, massage in and leave on overnight, wash out in the morning 1 Bottle 11     fluocinonide (LIDEX) 0.05 % external solution Use to treat any areas on the scalp that are itchy as needed. Use twice daily to any areas of complete hair loss. 60 mL 11     ketoconazole (NIZORAL) 2 % shampoo Apply to scalp, lather, then rinse, do every other day alternating with Head & Shoulders 120 mL 5     levothyroxine (SYNTHROID, LEVOTHROID) 112 MCG tablet  Take by mouth daily       montelukast (SINGULAIR) 10 MG tablet Take 10 mg by mouth At Bedtime       RANITIDINE HCL PO Take 150 mg by mouth 2 times daily       tacrolimus (PROTOPIC) 0.1 % ointment Apply topically 2 times daily 60 g 1     ferrous gluconate (FERGON) 324 (38 FE) MG tablet One tablet daily (Patient not taking: Reported on 10/17/2018) 60 tablet 1     omalizumab (XOLAIR) 150 MG injection Inject 300 mg Subcutaneous       triamcinolone acetonide (KENALOG) 10 MG/ML injection See med note (Patient not taking: Reported on 10/17/2018) 5 mL 0        Allergies   Allergen Reactions     Acetaminophen Hives     Penicillins Hives     Shellfish Allergy GI Disturbance     Silver Nitrate Hives     tegaderm adhesive dressing      Sulfa Drugs Hives     Vicodin [Hydrocodone-Acetaminophen] Hives         Review of Systems:  -As per HPI  -Constitutional: Otherwise feeling well today, in usual state of health.  -HEENT: Patient denies nonhealing oral sores.  -Skin: As above in HPI. No additional skin concerns.    Physical exam:  Vitals: There were no vitals taken for this visit.  GEN: This is a well developed, well-nourished female in no acute distress, in a pleasant mood.    SKIN: Focused examination of the face and scalp was performed.  -Kim skin type: I  -Hair pull test negative  -Multiple patches of alopecia on the scalp, particularly occipital scalp. Patches of alopecia on crown/vertex and parietal scalp, but there are thin strands of hair in these patches. Overall mildly improved. On the occipital scalp there are several pinpoint pustules.  -No other lesions of concern on areas examined.     Impression/Plan:  1. Alopecia areata: mildly improved overall with treatments.     Kenalog intralesional injection procedure note: After verbal consent and discussion of risks including but not limited to atrophy, pain, and bruising, time out was performed, the patient underwent positioning and the area was prepped with  isopropyl alcohol, 3 total cc of Kenalog 10 mg/cc was injected into 30 sites throughout the scalp. The patient tolerated the procedure well and left the Dermatology clinic in good condition. (if patient develops rash following ILK, she is to take photos and upload to her mychart)    Continue clobetasol 0.05% shampoo two to three times weekly    Continue Head&Shoulders shampoo    Continue fluocinolone 0.01% oil once weekly    Continue minoxidil 5% foam daily    Photos taken today and placed in chart    2. Hx of Non-specific urticarial appearing eruption of face and arms:    Appears to be associated with ILK injections. Discussed that this has occurred with other patients. Patient will continue to monitor for reaction, if this happens again she will take photos and send via mychart.  On Zyrtec due to seasonal allergies, can take 1 to 2 additional antihistamines at time of reaction should it occur again    3.   Mild folliculitis    Clindamycin 1% lotion BID--apply to posterior scalp    CC [id number] [place] [address] on close of this encounter.  Follow-up in 6 weeks, earlier for new or changing lesions.        staffed the patient.    Staff Involved:  Resident(Juan R Prince)/Staff    Patient was seen and examined with the medicine/dermatology resident. I agree with the history, review of systems, physical examination, assessments and plan. I was present for the key portions of the ILK procedures.    Nita Brown MD  Professor and  Chair  Department of Dermatology  [facility]                                          Drug Administration Record    Prior to injection, verified patient identity using patient's name and date of birth.  Due to injection administration, patient instructed to remain in clinic for 15 minutes  afterwards, and to report any adverse reaction to me immediately.    Drug Name: triamcinolone acetonide(kenalog)  Dose: 3mL  of triamcinolone 10mg/mL, 30mg dose  Route administered: ID  NDC #: [id number]: Kenalog-10 (4168-8341-02)  Amount of waste(mL):2mL  Reason for waste: Single use vial    LOT #: [id number]  SITE: scalp  : [employer]  EXPIRATION DATE: 02/2021    Again, thank you for allowing me to participate in the care of your patient.      Sincerely,    Juan R Prince MD

## 2019-11-04 ENCOUNTER — OFFICE VISIT (OUTPATIENT)
Dept: DERMATOLOGY | Facility: CLINIC | Age: 64
End: 2019-11-04
Payer: COMMERCIAL

## 2019-11-04 ENCOUNTER — MYC MEDICAL ADVICE (OUTPATIENT)
Dept: DERMATOLOGY | Facility: CLINIC | Age: 64
End: 2019-11-04

## 2019-11-04 VITALS — DIASTOLIC BLOOD PRESSURE: 70 MMHG | SYSTOLIC BLOOD PRESSURE: 117 MMHG | HEART RATE: 71 BPM

## 2019-11-04 DIAGNOSIS — L72.0 MILIA: Primary | ICD-10-CM

## 2019-11-04 DIAGNOSIS — L63.9 ALOPECIA AREATA: ICD-10-CM

## 2019-11-04 DIAGNOSIS — L71.9 ROSACEA: ICD-10-CM

## 2019-11-04 RX ORDER — TRETINOIN 0.25 MG/G
CREAM TOPICAL
Qty: 45 G | Refills: 1 | Status: SHIPPED | OUTPATIENT
Start: 2019-11-04

## 2019-11-04 RX ORDER — FAMOTIDINE 10 MG
TABLET ORAL
Refills: 1 | COMMUNITY
Start: 2019-10-21

## 2019-11-04 ASSESSMENT — PAIN SCALES - GENERAL: PAINLEVEL: NO PAIN (0)

## 2019-11-04 NOTE — LETTER
"11/4/2019     RE: Maria C Goodman  [address] [place] [address]     Dear Colleague,    Thank you for referring your patient, Maria C Goodman, to the [facility] DERMATOLOGY at [facility]. Please see a copy of my visit note below.    [facility] Dermatology Note      Dermatology Problem List:  1. Alopecia Areata:  -Current Tx: Rogaine 5% 5-6x/wk, clobetasol 0.05% shampoo 2x/w, dermasmoothe FS oil 1x/w  2. Hand Dermatitis  3. Urticaria, cold aggravated/induced  -Current Tx: Xolair  4. Solar lentigines   -tretinoin 0.1% cream   5. Mild folliculitis posterior scalp  -Clindamycin 1% lotion BID as needed  6. Milia    Start tretinoin 0.025% cream  7. History of of Non-specific urticarial appearing eruption of face     Appears to be associated with ILK injections. Discussed that this has occurred with other patients. Patient will continue to monitor for reaction with any future ILK injections.        Encounter Date: Nov 4, 2019    CC:  Chief Complaint   Patient presents with     Hair Loss     PT is here for a hair loss f/u         History of Present Illness:  Ms. Maria C Goodman is a 64 year old female who presents as a follow-up for alopecia areata. The patient was last seen 10/3/19 when she had ILK done to 30 sites. Patient reports new hair growth in ophiasis region but continued thinning of hair. Patient reports no pain, burning or itch. Patient reports that folliculitis in posterior scalp resolved with clindamycin 1% lotion. Patient is concerned about white \"pimples\" on cheeks and is wondering if they are related to steroids. Patient also reports that for the last 4 sessions of ILK, the next day she develops a transient erythema on her cheeks which is asymptomatic and resolves in 24 hrs.    On ROS, patient notes night sweats that she attributes to exemestane, muscle stiffness related to mastectomy, and joint pain due to early arthritis.    Past Medical History: "   Patient Active Problem List   Diagnosis     Alopecia areata     Dermatitis     Hypertrichosis     Urticaria     Autoimmune disease (H)     Dermatitis, seborrheic     Past Medical History:   Diagnosis Date     Asthma      Hypothyroidism      Lobular breast cancer (H)     s/p chemotherapy and radiation, in remission     Multiple allergies      No past surgical history on file.    Social History:  Patient reports that she has never smoked. She has never used smokeless tobacco. Patient is  and lives with , don and dog in [place]. Patient is retired and volunteers. Patient has son with learning disabilities.    Family History:  Family History   Problem Relation Age of Onset     Rheumatoid Arthritis Mother      Cancer No family hx of         skin cancer     Skin Cancer No family hx of         no skin cancer       Medications:  Current Outpatient Medications   Medication Sig Dispense Refill     ALBUTEROL IN Inhale into the lungs daily as needed       Calcium Carbonate-Vitamin D (CALCIUM + D PO) Take by mouth daily       cetirizine (ZYRTEC) 10 MG tablet Take 10 mg by mouth daily       Cholecalciferol (VITAMIN D) 1000 UNITS capsule Take 5,000 Units by mouth daily       clindamycin (CLEOCIN T) 1 % external lotion Apply topically 2 times daily Apply to back of scalp 60 mL 1     clobetasol (TEMOVATE) 0.05 % external cream Apply a fine layer to scalp in the morning after shampooing, a tube should last 2 months 60 g 1     clobetasol (TEMOVATE) 0.05 % ointment Apply a fine layer to the scalp at night after application of Rogaine, shampoo in am. A tube should last 2 months. 60 g 1     clobetasol propionate (CLOBEX) 0.05 % external shampoo APPLY TOPICALLY TO A DRY SCALP, LEAVE ON FOR 10-15 MINUTES, THEN LATHER AND RINSE. DO EVERY OTHER  mL 11     escitalopram (LEXAPRO) 20 MG tablet Take one tablet by mouth once daily with 10 mg tablet for total dose 30 mg.       EXEMESTANE PO Take by mouth daily        famotidine (PEPCID) 10 MG tablet TK 1 T PO QD  1     ferrous gluconate (FERGON) 324 (38 FE) MG tablet One tablet daily 60 tablet 1     Fluocinolone Acetonide Scalp (DERMA-SMOOTHE/FS SCALP) 0.01 % OIL oil Apply topically once a week Use once a week. Apply 1-2 capfuls to dry scalp, massage in and leave on overnight, wash out in the morning 1 Bottle 11     fluocinonide (LIDEX) 0.05 % external solution Use to treat any areas on the scalp that are itchy as needed. Use twice daily to any areas of complete hair loss. 60 mL 11     ketoconazole (NIZORAL) 2 % shampoo Apply to scalp, lather, then rinse, do every other day alternating with Head & Shoulders 120 mL 5     levothyroxine (SYNTHROID, LEVOTHROID) 112 MCG tablet Take by mouth daily       montelukast (SINGULAIR) 10 MG tablet Take 10 mg by mouth At Bedtime       tacrolimus (PROTOPIC) 0.1 % ointment Apply topically 2 times daily 60 g 1     tretinoin (RETIN-A) 0.025 % external cream Apply a pea size amount to face daily as tolerated - ok for night time application 45 g 1     triamcinolone acetonide (KENALOG) 10 MG/ML injection See med note 5 mL 0     omalizumab (XOLAIR) 150 MG injection Inject 300 mg Subcutaneous       Allergies   Allergen Reactions     Acetaminophen Hives     Penicillins Hives     Shellfish Allergy GI Disturbance     Silver Nitrate Hives     tegaderm adhesive dressing      Sulfa Drugs Hives     Vicodin [Hydrocodone-Acetaminophen] Hives         Review of Systems:  -As per HPI  -Constitutional: Otherwise feeling well today, in usual state of health.  -HEENT: Patient denies nonhealing oral sores.  -Skin: As above in HPI. No additional skin concerns.    Physical exam:  Vitals: /70 (BP Location: Left arm, Patient Position: Sitting, Cuff Size: Adult Regular)   Pulse 71   GEN: This is a well developed, well-nourished female in no acute distress, in a pleasant mood.    SKIN: Focused examination of the scalp and face was performed.  -Kim skin type:  II  -Hair pull test negative  -Multiple patches of alopecia on the scalp, particularly occipital scalp. Patches of alopecia on crown/vertex and parietal scalp, but there are thin strands of hair in these patches.   - Negative hair pull tests  -Overall mildly improved. New hair growth in ophiasis region  -No other lesions of concern on areas examined.     Impression/Plan:  1. Alopecia areata, with evidence of hair regrowth    Continue clobetasol 0.05% shampoo two to three times weekly    Continue Head&Shoulders shampoo    Continue fluocinolone 0.01% oil once weekly    Continue minoxidil 5% foam daily    2. Milia    Start tretinoin 0.025% cream    3. History of of Non-specific urticarial appearing eruption of face     Appears to be associated with ILK injections. Discussed that this has occurred with other patients. Patient will continue to monitor for reactions with any future IL[place] injections[place] [address] on close of this encounter.  Follow-up in 3 months, earlier for new or changing lesions.     Staff Involved:  I,Souleymane Ortiz, saw and examined the patient in the presence of Dr. Brown.    Staff Physician:  I was present with the medical student who participated in the service and in the documentation of the note. I have verified the history and personally performed the physical exam and medical decision making. I agree with the assessment and plan of care as documented in the note.         Nita Brown MD  Professor and Chair  Department of Dermatology  [facility]: Phone: [phone], Fax:[phone]  [facility]: Phone: [phone], Fax: [phone]

## 2019-11-04 NOTE — NURSING NOTE
Dermatology Rooming Note    Maria C Goodman's goals for this visit include:   Chief Complaint   Patient presents with     Hair Loss     PT is here for a hair loss f/u     Zena Greenberg. EMT

## 2019-11-04 NOTE — PROGRESS NOTES
"[facility] Dermatology Note      Dermatology Problem List:  1. Alopecia Areata:  -Current Tx: Rogaine 5% 5-6x/wk, clobetasol 0.05% shampoo 2x/w, dermasmoothe FS oil 1x/w  2. Hand Dermatitis  3. Urticaria, cold aggravated/induced  -Current Tx: Xolair  4. Solar lentigines   -tretinoin 0.1% cream   5. Mild folliculitis posterior scalp  -Clindamycin 1% lotion BID as needed  6. Milia    Start tretinoin 0.025% cream  7. History of of Non-specific urticarial appearing eruption of face     Appears to be associated with ILK injections. Discussed that this has occurred with other patients. Patient will continue to monitor for reaction with any future ILK injections.        Encounter Date: Nov 4, 2019    CC:  Chief Complaint   Patient presents with     Hair Loss     PT is here for a hair loss f/u         History of Present Illness:  Ms. Maria C Goodman is a 64 year old female who presents as a follow-up for alopecia areata. The patient was last seen 10/3/19 when she had ILK done to 30 sites. Patient reports new hair growth in ophiasis region but continued thinning of hair. Patient reports no pain, burning or itch. Patient reports that folliculitis in posterior scalp resolved with clindamycin 1% lotion. Patient is concerned about white \"pimples\" on cheeks and is wondering if they are related to steroids. Patient also reports that for the last 4 sessions of ILK, the next day she develops a transient erythema on her cheeks which is asymptomatic and resolves in 24 hrs.    On ROS, patient notes night sweats that she attributes to exemestane, muscle stiffness related to mastectomy, and joint pain due to early arthritis.    Past Medical History:   Patient Active Problem List   Diagnosis     Alopecia areata     Dermatitis     Hypertrichosis     Urticaria     Autoimmune disease (H)     Dermatitis, seborrheic     Past Medical History:   Diagnosis Date     Asthma      Hypothyroidism      Lobular breast cancer (H)     s/p " chemotherapy and radiation, in remission     Multiple allergies      No past surgical history on file.    Social History:  Patient reports that she has never smoked. She has never used smokeless tobacco. Patient is  and lives with , don and dog in [place]. Patient is retired and volunteers. Patient has son with learning disabilities.    Family History:  Family History   Problem Relation Age of Onset     Rheumatoid Arthritis Mother      Cancer No family hx of         skin cancer     Skin Cancer No family hx of         no skin cancer       Medications:  Current Outpatient Medications   Medication Sig Dispense Refill     ALBUTEROL IN Inhale into the lungs daily as needed       Calcium Carbonate-Vitamin D (CALCIUM + D PO) Take by mouth daily       cetirizine (ZYRTEC) 10 MG tablet Take 10 mg by mouth daily       Cholecalciferol (VITAMIN D) 1000 UNITS capsule Take 5,000 Units by mouth daily       clindamycin (CLEOCIN T) 1 % external lotion Apply topically 2 times daily Apply to back of scalp 60 mL 1     clobetasol (TEMOVATE) 0.05 % external cream Apply a fine layer to scalp in the morning after shampooing, a tube should last 2 months 60 g 1     clobetasol (TEMOVATE) 0.05 % ointment Apply a fine layer to the scalp at night after application of Rogaine, shampoo in am. A tube should last 2 months. 60 g 1     clobetasol propionate (CLOBEX) 0.05 % external shampoo APPLY TOPICALLY TO A DRY SCALP, LEAVE ON FOR 10-15 MINUTES, THEN LATHER AND RINSE. DO EVERY OTHER  mL 11     escitalopram (LEXAPRO) 20 MG tablet Take one tablet by mouth once daily with 10 mg tablet for total dose 30 mg.       EXEMESTANE PO Take by mouth daily       famotidine (PEPCID) 10 MG tablet TK 1 T PO QD  1     ferrous gluconate (FERGON) 324 (38 FE) MG tablet One tablet daily 60 tablet 1     Fluocinolone Acetonide Scalp (DERMA-SMOOTHE/FS SCALP) 0.01 % OIL oil Apply topically once a week Use once a week. Apply 1-2 capfuls to dry scalp,  massage in and leave on overnight, wash out in the morning 1 Bottle 11     fluocinonide (LIDEX) 0.05 % external solution Use to treat any areas on the scalp that are itchy as needed. Use twice daily to any areas of complete hair loss. 60 mL 11     ketoconazole (NIZORAL) 2 % shampoo Apply to scalp, lather, then rinse, do every other day alternating with Head & Shoulders 120 mL 5     levothyroxine (SYNTHROID, LEVOTHROID) 112 MCG tablet Take by mouth daily       montelukast (SINGULAIR) 10 MG tablet Take 10 mg by mouth At Bedtime       tacrolimus (PROTOPIC) 0.1 % ointment Apply topically 2 times daily 60 g 1     tretinoin (RETIN-A) 0.025 % external cream Apply a pea size amount to face daily as tolerated - ok for night time application 45 g 1     triamcinolone acetonide (KENALOG) 10 MG/ML injection See med note 5 mL 0     omalizumab (XOLAIR) 150 MG injection Inject 300 mg Subcutaneous       Allergies   Allergen Reactions     Acetaminophen Hives     Penicillins Hives     Shellfish Allergy GI Disturbance     Silver Nitrate Hives     tegaderm adhesive dressing      Sulfa Drugs Hives     Vicodin [Hydrocodone-Acetaminophen] Hives         Review of Systems:  -As per HPI  -Constitutional: Otherwise feeling well today, in usual state of health.  -HEENT: Patient denies nonhealing oral sores.  -Skin: As above in HPI. No additional skin concerns.    Physical exam:  Vitals: /70 (BP Location: Left arm, Patient Position: Sitting, Cuff Size: Adult Regular)   Pulse 71   GEN: This is a well developed, well-nourished female in no acute distress, in a pleasant mood.    SKIN: Focused examination of the scalp and face was performed.  -Kim skin type: II  -Hair pull test negative  -Multiple patches of alopecia on the scalp, particularly occipital scalp. Patches of alopecia on crown/vertex and parietal scalp, but there are thin strands of hair in these patches.   - Negative hair pull tests  -Overall mildly improved. New hair  growth in ophiasis region  -No other lesions of concern on areas examined.     Impression/Plan:  1. Alopecia areata, with evidence of hair regrowth    Continue clobetasol 0.05% shampoo two to three times weekly    Continue Head&Shoulders shampoo    Continue fluocinolone 0.01% oil once weekly    Continue minoxidil 5% foam daily    2. Milia    Start tretinoin 0.025% cream    3. History of of Non-specific urticarial appearing eruption of face     Appears to be associated with ILK injections. Discussed that this has occurred with other patients. Patient will continue to monitor for reactions with any future ILK injections.     CC [facility]  [address] [place] [address] on close of this encounter.  Follow-up in 3 months, earlier for new or changing lesions.     Staff Involved:  I,Souleymane Ortiz, saw and examined the patient in the presence of Dr. Brown.    Staff Physician:  I was present with the medical student who participated in the service and in the documentation of the note. I have verified the history and personally performed the physical exam and medical decision making. I agree with the assessment and plan of care as documented in the note.         Nita Brown MD  Professor and Chair  Department of Dermatology  [facility]: Phone: [phone], Fax:[phone]  [facility]: Phone: [phone], Fax: [phone]

## 2019-11-08 ENCOUNTER — HEALTH MAINTENANCE LETTER (OUTPATIENT)
Age: 64
End: 2019-11-08

## 2020-01-06 ENCOUNTER — DOCUMENTATION ONLY (OUTPATIENT)
Dept: CARE COORDINATION | Facility: CLINIC | Age: 65
End: 2020-01-06

## 2020-01-09 ENCOUNTER — OFFICE VISIT (OUTPATIENT)
Dept: DERMATOLOGY | Facility: CLINIC | Age: 65
End: 2020-01-09
Payer: COMMERCIAL

## 2020-01-09 DIAGNOSIS — L63.9 AA (ALOPECIA AREATA): Primary | ICD-10-CM

## 2020-01-09 DIAGNOSIS — R21 RASH: ICD-10-CM

## 2020-01-09 ASSESSMENT — PAIN SCALES - GENERAL: PAINLEVEL: NO PAIN (0)

## 2020-01-09 NOTE — LETTER
1/9/2020       RE: Maria C Goodman  [address] [place] [address]     Dear Colleague,    Thank you for referring your patient, Maria C Goodman, to the [facility] DERMATOLOGY at [facility]. Please see a copy of my visit note below.    [facility] Dermatology Note      Dermatology Problem List:  1. Alopecia Areata:  -Current Tx: Rogaine 5% 5-6x/wk, clobetasol 0.05% shampoo 2x/w, dermasmoothe FS oil 1x/w  - fexofenadine 180 mg daily  2. Hand Dermatitis  3. Urticaria, cold aggravated/induced  -Current Tx: Xolair  4. Solar lentigines   -tretinoin 0.1% cream   5. Mild folliculitis posterior scalp  -Clindamycin 1% lotion BID as needed  6. Milia    Start tretinoin 0.025% cream  7. History of of Non-specific urticarial appearing eruption of face     Appears to be associated with ILK injections. Discussed that this has occurred with other patients. Patient will continue to monitor for reaction with any future ILK injections.       Encounter Date: Jan 9, 2020    CC:   Chief Complaint   Patient presents with     Hair Loss     Maria C is here today for Hair Loss follow up. Maria C notes regrowth since her last visit.          History of Present Illness:  Ms. Maria C Goodman is a 64 year old female who presents as a follow-up for alopecia areata. The patient was last seen 11/4/2019 when she was continued on her regimen as above that includes clobetasol shampoo, fluocinolone oil, and minoxidil foam. She has been using fluocinolone oil weekly, clobetasol shampoo twice weekly and minoxidil foam 3-4 times weekly. She feels like she has had hair regrowth since her last visit. She denies increased hair loss/shedding. She only occasionally has itching of her scalp which is relieved by her topical steroids. She hasn't noticed any association with itching and AA activity. She says she is otherwise doing very well.  She takes cetirizine 10 mg twice daily for allergies. She has not yet  tried fexofenadine for her AA.   She denies hair loss of any other body areas and she also denies any nail changes.      Past Medical History:   Patient Active Problem List   Diagnosis     Alopecia areata     Dermatitis     Hypertrichosis     Urticaria     Autoimmune disease (H)     Dermatitis, seborrheic     Past Medical History:   Diagnosis Date     Asthma      Hypothyroidism      Lobular breast cancer (H)     s/p chemotherapy and radiation, in remission     Multiple allergies      No past surgical history on file.    Social History:  Patient reports that she has never smoked. She has never used smokeless tobacco.    Family History:  Family History   Problem Relation Age of Onset     Rheumatoid Arthritis Mother      Cancer No family hx of         skin cancer     Skin Cancer No family hx of         no skin cancer       Medications:  Current Outpatient Medications   Medication Sig Dispense Refill     ALBUTEROL IN Inhale into the lungs daily as needed       Calcium Carbonate-Vitamin D (CALCIUM + D PO) Take by mouth daily       cetirizine (ZYRTEC) 10 MG tablet Take 10 mg by mouth daily       Cholecalciferol (VITAMIN D) 1000 UNITS capsule Take 5,000 Units by mouth daily       clindamycin (CLEOCIN T) 1 % external lotion Apply topically 2 times daily Apply to back of scalp 60 mL 1     clobetasol (TEMOVATE) 0.05 % external cream Apply a fine layer to scalp in the morning after shampooing, a tube should last 2 months 60 g 1     clobetasol (TEMOVATE) 0.05 % ointment Apply a fine layer to the scalp at night after application of Rogaine, shampoo in am. A tube should last 2 months. 60 g 1     clobetasol propionate (CLOBEX) 0.05 % external shampoo APPLY TOPICALLY TO A DRY SCALP, LEAVE ON FOR 10-15 MINUTES, THEN LATHER AND RINSE. DO EVERY OTHER  mL 11     escitalopram (LEXAPRO) 20 MG tablet Take one tablet by mouth once daily with 10 mg tablet for total dose 30 mg.       EXEMESTANE PO Take by mouth daily       famotidine  (PEPCID) 10 MG tablet TK 1 T PO QD  1     ferrous gluconate (FERGON) 324 (38 FE) MG tablet One tablet daily 60 tablet 1     Fluocinolone Acetonide Scalp (DERMA-SMOOTHE/FS SCALP) 0.01 % OIL oil Apply topically once a week Use once a week. Apply 1-2 capfuls to dry scalp, massage in and leave on overnight, wash out in the morning 1 Bottle 11     fluocinonide (LIDEX) 0.05 % external solution Use to treat any areas on the scalp that are itchy as needed. Use twice daily to any areas of complete hair loss. 60 mL 11     ketoconazole (NIZORAL) 2 % shampoo Apply to scalp, lather, then rinse, do every other day alternating with Head & Shoulders 120 mL 5     levothyroxine (SYNTHROID, LEVOTHROID) 112 MCG tablet Take by mouth daily       montelukast (SINGULAIR) 10 MG tablet Take 10 mg by mouth At Bedtime       tacrolimus (PROTOPIC) 0.1 % ointment Apply topically 2 times daily 60 g 1     tretinoin (RETIN-A) 0.025 % external cream Apply a pea size amount to face daily as tolerated - ok for night time application 45 g 1     triamcinolone acetonide (KENALOG) 10 MG/ML injection See med note 5 mL 0     omalizumab (XOLAIR) 150 MG injection Inject 300 mg Subcutaneous          Allergies   Allergen Reactions     Acetaminophen Hives     Penicillins Hives     Shellfish Allergy GI Disturbance     Silver Nitrate Hives     tegaderm adhesive dressing      Sulfa Drugs Hives     Vicodin [Hydrocodone-Acetaminophen] Hives         Review of Systems:  -As per HPI  -Constitutional: Otherwise feeling well today, in usual state of health.  -HEENT: Patient denies nonhealing oral sores.  -Skin: As above in HPI. No additional skin concerns.    Physical exam:  Vitals: There were no vitals taken for this visit.  GEN: This is a well developed, well-nourished female in no acute distress, in a pleasant mood.    SKIN: examination of the scalp, face, and hands/nails reveals  -Kim skin type: II  -there are patches of hair loss located diffusely through the  scalp and most noticeable on the occipital and parietal scalps in an ophiasis distribution  -areas of improvement on exam today with increased hair growth include the occipital and parietal scalps  -hair pull test is negative  -no new areas of hair loss  -No other lesions of concern on areas examined.     Impression/Plan:  1. Alopecia areata: slowly improving with evidence of hair regrowth  - declines ILK today given previous urticarial reactions  - continue fluocinolone 0.01% oil once weekly to scalp  - increase clobetasol 0.05% shampoo to four times weekly (on days not using fluocinolone)  - increase minoxidil 5% foam to daily application  - start using fexofenadine 180 mg daily (this may replace her cetirizine 10 mg BID, however, if patient is still having allergy symptoms with fexofenadine dose alone, may use cetirizine in combination. Warned of possible increase risk of sedation using both).      [place] [address] on close of this encounter.  Follow-up in 2 months, earlier for new or changing lesions.       Dr. Brown staffed the patient.    Staff Involved:  Resident(Paulo)/Staff    Harrison Bates MD, PhD  Medicine-Dermatology PGY-4    Patient was seen and examined with the medicine/dermatology resident. I agree with the history, review of systems, physical examination, assessments and plan.    Nita Brown MD  Professor and  Chair  Department of Dermatology  [facility]

## 2020-01-09 NOTE — PROGRESS NOTES
[facility] Dermatology Note      Dermatology Problem List:  1. Alopecia Areata:  -Current Tx: Rogaine 5% 5-6x/wk, clobetasol 0.05% shampoo 2x/w, dermasmoothe FS oil 1x/w  - fexofenadine 180 mg daily  2. Hand Dermatitis  3. Urticaria, cold aggravated/induced  -Current Tx: Xolair  4. Solar lentigines   -tretinoin 0.1% cream   5. Mild folliculitis posterior scalp  -Clindamycin 1% lotion BID as needed  6. Milia    Start tretinoin 0.025% cream  7. History of of Non-specific urticarial appearing eruption of face     Appears to be associated with ILK injections. Discussed that this has occurred with other patients. Patient will continue to monitor for reaction with any future ILK injections.       Encounter Date: Jan 9, 2020    CC:   Chief Complaint   Patient presents with     Hair Loss     Maria C is here today for Hair Loss follow up. Maria C notes regrowth since her last visit.          History of Present Illness:  Ms. Maria C Goodman is a 64 year old female who presents as a follow-up for alopecia areata. The patient was last seen 11/4/2019 when she was continued on her regimen as above that includes clobetasol shampoo, fluocinolone oil, and minoxidil foam. She has been using fluocinolone oil weekly, clobetasol shampoo twice weekly and minoxidil foam 3-4 times weekly. She feels like she has had hair regrowth since her last visit. She denies increased hair loss/shedding. She only occasionally has itching of her scalp which is relieved by her topical steroids. She hasn't noticed any association with itching and AA activity. She says she is otherwise doing very well.  She takes cetirizine 10 mg twice daily for allergies. She has not yet tried fexofenadine for her AA.   She denies hair loss of any other body areas and she also denies any nail changes.      Past Medical History:   Patient Active Problem List   Diagnosis     Alopecia areata     Dermatitis     Hypertrichosis     Urticaria     Autoimmune disease (H)      Dermatitis, seborrheic     Past Medical History:   Diagnosis Date     Asthma      Hypothyroidism      Lobular breast cancer (H)     s/p chemotherapy and radiation, in remission     Multiple allergies      No past surgical history on file.    Social History:  Patient reports that she has never smoked. She has never used smokeless tobacco.    Family History:  Family History   Problem Relation Age of Onset     Rheumatoid Arthritis Mother      Cancer No family hx of         skin cancer     Skin Cancer No family hx of         no skin cancer       Medications:  Current Outpatient Medications   Medication Sig Dispense Refill     ALBUTEROL IN Inhale into the lungs daily as needed       Calcium Carbonate-Vitamin D (CALCIUM + D PO) Take by mouth daily       cetirizine (ZYRTEC) 10 MG tablet Take 10 mg by mouth daily       Cholecalciferol (VITAMIN D) 1000 UNITS capsule Take 5,000 Units by mouth daily       clindamycin (CLEOCIN T) 1 % external lotion Apply topically 2 times daily Apply to back of scalp 60 mL 1     clobetasol (TEMOVATE) 0.05 % external cream Apply a fine layer to scalp in the morning after shampooing, a tube should last 2 months 60 g 1     clobetasol (TEMOVATE) 0.05 % ointment Apply a fine layer to the scalp at night after application of Rogaine, shampoo in am. A tube should last 2 months. 60 g 1     clobetasol propionate (CLOBEX) 0.05 % external shampoo APPLY TOPICALLY TO A DRY SCALP, LEAVE ON FOR 10-15 MINUTES, THEN LATHER AND RINSE. DO EVERY OTHER  mL 11     escitalopram (LEXAPRO) 20 MG tablet Take one tablet by mouth once daily with 10 mg tablet for total dose 30 mg.       EXEMESTANE PO Take by mouth daily       famotidine (PEPCID) 10 MG tablet TK 1 T PO QD  1     ferrous gluconate (FERGON) 324 (38 FE) MG tablet One tablet daily 60 tablet 1     Fluocinolone Acetonide Scalp (DERMA-SMOOTHE/FS SCALP) 0.01 % OIL oil Apply topically once a week Use once a week. Apply 1-2 capfuls to dry scalp, massage in  and leave on overnight, wash out in the morning 1 Bottle 11     fluocinonide (LIDEX) 0.05 % external solution Use to treat any areas on the scalp that are itchy as needed. Use twice daily to any areas of complete hair loss. 60 mL 11     ketoconazole (NIZORAL) 2 % shampoo Apply to scalp, lather, then rinse, do every other day alternating with Head & Shoulders 120 mL 5     levothyroxine (SYNTHROID, LEVOTHROID) 112 MCG tablet Take by mouth daily       montelukast (SINGULAIR) 10 MG tablet Take 10 mg by mouth At Bedtime       tacrolimus (PROTOPIC) 0.1 % ointment Apply topically 2 times daily 60 g 1     tretinoin (RETIN-A) 0.025 % external cream Apply a pea size amount to face daily as tolerated - ok for night time application 45 g 1     triamcinolone acetonide (KENALOG) 10 MG/ML injection See med note 5 mL 0     omalizumab (XOLAIR) 150 MG injection Inject 300 mg Subcutaneous          Allergies   Allergen Reactions     Acetaminophen Hives     Penicillins Hives     Shellfish Allergy GI Disturbance     Silver Nitrate Hives     tegaderm adhesive dressing      Sulfa Drugs Hives     Vicodin [Hydrocodone-Acetaminophen] Hives         Review of Systems:  -As per HPI  -Constitutional: Otherwise feeling well today, in usual state of health.  -HEENT: Patient denies nonhealing oral sores.  -Skin: As above in HPI. No additional skin concerns.    Physical exam:  Vitals: There were no vitals taken for this visit.  GEN: This is a well developed, well-nourished female in no acute distress, in a pleasant mood.    SKIN: examination of the scalp, face, and hands/nails reveals  -Kim skin type: II  -there are patches of hair loss located diffusely through the scalp and most noticeable on the occipital and parietal scalps in an ophiasis distribution  -areas of improvement on exam today with increased hair growth include the occipital and parietal scalps  -hair pull test is negative  -no new areas of hair loss  -No other lesions of  concern on areas examined.     Impression/Plan:  1. Alopecia areata: slowly improving with evidence of hair regrowth  - declines ILK today given previous urticarial reactions  - continue fluocinolone 0.01% oil once weekly to scalp  - increase clobetasol 0.05% shampoo to four times weekly (on days not using fluocinolone)  - increase minoxidil 5% foam to daily application  - start using fexofenadine 180 mg daily (this may replace her cetirizine 10 mg BID, however, if patient is still having allergy symptoms with fexofenadine dose alone, may use cetirizine in combination. Warned of possible increase risk of sedation using both).      [place] [address] on close of this encounter.  Follow-up in 2 months, earlier for new or changing lesions.       Dr. Brown staffed the patient.    Staff Involved:  Resident(Paulo)/Staff    Harrison Bates MD, PhD  Medicine-Dermatology PGY-4    Patient was seen and examined with the medicine/dermatology resident. I agree with the history, review of systems, physical examination, assessments and plan.    Nita Brown MD  Professor and  Chair  Department of Dermatology  [facility]

## 2020-01-09 NOTE — NURSING NOTE
Chief Complaint   Patient presents with     Hair Loss     Maria C is here today for Hair Loss follow up. Maria C notes regrowth since her last visit.      Kaye Metzger LPN

## 2020-01-09 NOTE — PATIENT INSTRUCTIONS
- continue using dermasmoothe oil to scalp once weekly  - use clobetasol 0.05% shampoo at least 4 times a week  - continue to use minoxidil 5% foam daily  - switch cetirizine to fexofenadine 180 mg by mouth daily

## 2020-02-23 ENCOUNTER — HEALTH MAINTENANCE LETTER (OUTPATIENT)
Age: 65
End: 2020-02-23

## 2020-02-25 DIAGNOSIS — L30.9 DERMATITIS: ICD-10-CM

## 2020-02-25 DIAGNOSIS — L21.9 DERMATITIS, SEBORRHEIC: ICD-10-CM

## 2020-02-25 DIAGNOSIS — L63.9 ALOPECIA AREATA: ICD-10-CM

## 2020-02-27 RX ORDER — CLOBETASOL PROPIONATE 0.05 G/100ML
SHAMPOO TOPICAL
Qty: 118 ML | Refills: 11 | Status: SHIPPED | OUTPATIENT
Start: 2020-02-27 | End: 2021-02-17

## 2020-02-27 NOTE — TELEPHONE ENCOUNTER
CLOBETASOL 0.05% SHAMPOO 118ML      Last Written Prescription Date:  2/19/19  Last Fill Quantity: 118 ml,   # refills: 11  Last Office Visit : 1/9/20  Future Office visit:  3/9/20  Note from 1/9/20:  increase clobetasol 0.05% shampoo to four times weekly (on days not using fluocinolone  Routing refill request to provider for review/approval because:  Discrepancy between sig on med list and requested refill and change made at appointment 1/9/20  No changes made to requested refill

## 2020-03-09 ENCOUNTER — OFFICE VISIT (OUTPATIENT)
Dept: DERMATOLOGY | Facility: CLINIC | Age: 65
End: 2020-03-09
Payer: COMMERCIAL

## 2020-03-09 VITALS — HEART RATE: 74 BPM | DIASTOLIC BLOOD PRESSURE: 65 MMHG | SYSTOLIC BLOOD PRESSURE: 109 MMHG

## 2020-03-09 DIAGNOSIS — L63.9 ALOPECIA AREATA: Primary | ICD-10-CM

## 2020-03-09 DIAGNOSIS — M35.9 AUTOIMMUNE DISEASE (H): ICD-10-CM

## 2020-03-09 ASSESSMENT — PAIN SCALES - GENERAL: PAINLEVEL: NO PAIN (0)

## 2020-03-09 NOTE — NURSING NOTE
Dermatology Rooming Note    Maria C Goodman's goals for this visit include:   Chief Complaint   Patient presents with     Hair Loss     Maria C states that her hair feels thinner but the hair is still growing and there is greater definition in the bare spots     Zena Greenberg, EMT

## 2020-03-09 NOTE — PATIENT INSTRUCTIONS
Please monitor for hives after ILK injections and send photos [facility]   Continue fluocinolone acetonide 0.01% oil once weekly  Continue clobetasol 0.05% shampoo 4x/wk  Continue minoxidil 5% foam daily  Continue fexofenadine 180 mg daily (if needed, may use cetirizine in combination. There is possible risk of sedation when using both).

## 2020-03-09 NOTE — PROGRESS NOTES
Drug Administration Record    Prior to injection, verified patient identity using patient's name and date of birth.  Due to injection administration, patient instructed to remain in clinic for 15 minutes  afterwards, and to report any adverse reaction to me immediately.    Drug Name: triamcinolone acetonide(kenalog)  Dose: 3mL of triamcinolone 10mg/mL, 30mg dose  Route administered: ID  NDC #: [id number]: [id number] ([id number])  Amount of waste(mL):2  Reason for waste: Multi dose vial    LOT #: [id number]  SITE: scalp  : Miso  EXPIRATION DATE: 6/2021

## 2020-03-09 NOTE — PROGRESS NOTES
[facility] Dermatology Note      Dermatology Problem List:  1. Alopecia areata   -Current tx: topical minoxidil 5% foam daily, clobetasol 0.05% shampoo 4x/wk, fluocinolone acetonide 0.01% oil once weekly, and fexofenadine 180 mg daily (this may replace her cetirizine 10 mg BID, however, if patient is still having allergy symptoms with fexofenadine dose alone, may use cetirizine in combination. Warned of possible increase risk of sedation using both). She also uses clindamycin lotion PRN for lesions on occipital scalp.   - Previous tx: cetirizine 10 mg BID  - s/p ILK 10 mg/mL ~3cc: 3/9/20. Patient has history of urticarial appearing eruption on the face and arms. Last injections were 7/2/19 and were held since then. Patient was advised to monitor and send photos through [employer].   - Photodocumentation    2. Hand Dermatitis    3. Urticaria, cold aggravated/induced  -Current Tx: Xolair    4. Solar lentigines   -tretinoin 0.1% cream     5. Mild folliculitis posterior scalp  -Clindamycin 1% lotion BID as needed    6. Milia  - Start tretinoin 0.025% cream    7. History of of Non-specific urticarial appearing eruption of face   - Appears to be associated with ILK injections. Discussed that this has occurred with other patients. Patient will continue to monitor for reaction with any future ILK injections.       Encounter Date: Mar 9, 2020    CC:   Chief Complaint   Patient presents with     Hair Loss     Maria C states that her hair feels thinner but the hair is still growing and there is greater definition in the bare spots         History of Present Illness:  Ms. Maria C Goodman is a 64 year old female who presents as a follow-up for alopecia areata. The patient was last seen 11/4/2019 when she was continued on her regimen as above that includes clobetasol shampoo, fluocinolone oil, and minoxidil foam. She has been using fluocinolone oil weekly, clobetasol shampoo twice weekly and minoxidil foam 3-4 times  weekly. She feels like she has had hair regrowth since her last visit. She denies increased hair loss/shedding. She only occasionally has itching of her scalp which is relieved by her topical steroids. She hasn't noticed any association with itching and AA activity. She says she is otherwise doing very well.  She takes cetirizine 10 mg twice daily for allergies. She has not yet tried fexofenadine for her AA.   She denies hair loss of any other body areas and she also denies any nail changes.      Today the patient reports her hair is thinner, but continuing to regrow; she appreciates greater density on prior alopecia patches. She denies any changes in eyebrows or eyelashes. She has continued using clobetasol 0.05% shampoo 4x/wk, fluocinolone acetonide 0.01% oil once weekly, topical minoxidil 5% foam daily, and fexofenadine 180 mg daily. She also uses clindamycin lotion PRN for lesions on occipital scalp. She reports no change after starting fexofenadine, but she would like to continue for a few weeks going into the warmer months before adding cetirizine. She previously took cetirizine 10 mg BID. She reports that her  fell and tore his quad recently; the patient only continued using shampoo for those two weeks. 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 She reports she has been healthy since her last visit and denies any new medications or medical conditions.     Otherwise the patient is feeling well without additional skin concerns at this time.      Past Medical History:   Patient Active Problem List   Diagnosis     Alopecia areata     Dermatitis     Hypertrichosis     Urticaria     Autoimmune disease (H)     Dermatitis, seborrheic     Past Medical History:   Diagnosis Date     Asthma      Hypothyroidism      Lobular breast cancer (H)     s/p chemotherapy and radiation, in remission     Multiple allergies      No past  surgical history on file.    Social History:  Patient reports that she has never smoked. She has never used smokeless tobacco.    Family History:  Family History   Problem Relation Age of Onset     Rheumatoid Arthritis Mother      Cancer No family hx of         skin cancer     Skin Cancer No family hx of         no skin cancer       Medications:  Current Outpatient Medications   Medication Sig Dispense Refill     ALBUTEROL IN Inhale into the lungs daily as needed       Calcium Carbonate-Vitamin D (CALCIUM + D PO) Take by mouth daily       cetirizine (ZYRTEC) 10 MG tablet Take 10 mg by mouth daily       Cholecalciferol (VITAMIN D) 1000 UNITS capsule Take 5,000 Units by mouth daily       clindamycin (CLEOCIN T) 1 % external lotion Apply topically 2 times daily Apply to back of scalp 60 mL 1     clobetasol (TEMOVATE) 0.05 % external cream Apply a fine layer to scalp in the morning after shampooing, a tube should last 2 months 60 g 1     clobetasol (TEMOVATE) 0.05 % ointment Apply a fine layer to the scalp at night after application of Rogaine, shampoo in am. A tube should last 2 months. 60 g 1     clobetasol propionate (CLOBEX) 0.05 % external shampoo APPLY TOPICALLY TO A DRY SCALP, LEAVE ON FOR 10-15 MINUTES, THEN LATHER AND RINSE. DO THIS EVERY OTHER  mL 11     escitalopram (LEXAPRO) 20 MG tablet Take one tablet by mouth once daily with 10 mg tablet for total dose 30 mg.       EXEMESTANE PO Take by mouth daily       famotidine (PEPCID) 10 MG tablet TK 1 T PO QD  1     ferrous gluconate (FERGON) 324 (38 FE) MG tablet One tablet daily 60 tablet 1     Fluocinolone Acetonide Scalp (DERMA-SMOOTHE/FS SCALP) 0.01 % OIL oil Apply topically once a week Use once a week. Apply 1-2 capfuls to dry scalp, massage in and leave on overnight, wash out in the morning 1 Bottle 11     fluocinonide (LIDEX) 0.05 % external solution Use to treat any areas on the scalp that are itchy as needed. Use twice daily to any areas of  complete hair loss. 60 mL 11     ketoconazole (NIZORAL) 2 % shampoo Apply to scalp, lather, then rinse, do every other day alternating with Head & Shoulders 120 mL 5     levothyroxine (SYNTHROID, LEVOTHROID) 112 MCG tablet Take by mouth daily       montelukast (SINGULAIR) 10 MG tablet Take 10 mg by mouth At Bedtime       tacrolimus (PROTOPIC) 0.1 % ointment Apply topically 2 times daily 60 g 1     tretinoin (RETIN-A) 0.025 % external cream Apply a pea size amount to face daily as tolerated - ok for night time application 45 g 1     triamcinolone acetonide (KENALOG) 10 MG/ML injection See med note 5 mL 0     omalizumab (XOLAIR) 150 MG injection Inject 300 mg Subcutaneous          Allergies   Allergen Reactions     Acetaminophen Hives     Penicillins Hives     Shellfish Allergy GI Disturbance     Silver Nitrate Hives     tegaderm adhesive dressing      Sulfa Drugs Hives     Vicodin [Hydrocodone-Acetaminophen] Hives         Review of Systems:  -As per HPI  -Constitutional: Otherwise feeling well today, in usual state of health.  -HEENT: Patient denies nonhealing oral sores.  -Skin: As above in HPI. No additional skin concerns.    Physical exam:  Vitals: /65 (BP Location: Right arm, Patient Position: Sitting, Cuff Size: Adult Regular)   Pulse 74   GEN: This is a well developed, well-nourished female in no acute distress, in a pleasant mood.    SKIN: Focused examination of the scalp and face was performed.   -Kim skin type: II  - Stable hair density on scalp compared to photodocumentation obtained at prior visit  - New hair fiber regrowth noted on the frontal scalp   - Patches of hair loss diffusely throughout the scalp, most prominent on the occipital and parietal scalp in an ophiasis distribution  - Negative hair pull tests.  - Eyebrows and eyelashes are within normal limits.   -No other lesions of concern on areas examined.     Impression/Plan:  1. Alopecia areata - slowly improving with evidence of  hair regrowth  - Kenalog intralesional injection procedure note: After verbal consent and discussion of risks including but not limited to atrophy, pain, and bruising, time out was performed, the patient underwent positioning, 3 total cc of Kenalog 10 mg/cc was injected into 30 site(s) on the scalp. The patient tolerated the procedure well and left the Dermatology clinic in good condition.  - Continue clobetasol 0.05% shampoo 4x/wk  - Continue fluocinolone 0.01% oil once weekly  - Continue topical minoxidil 5% foam daily  - Continue fexofenadine 180 mg daily (if patient is still having allergy symptoms with fexofenadine dose alone, may use cetirizine in combination. Warned of possible increase risk of sedation using both).   - Continue clindamycin lotion PRN for lesions on occipital scalp.   - [place] [id number] on close of this encounter.  Follow-up in 2-3 months, earlier for new or changing lesions.     Dr. Brown staffed the patient.    Staff Involved:  Scribe(Amanda Lemus)/Staff    Scribe Disclosure:  I, Amanda Lemus, am serving as a scribe to document services personally performed by Dr. Nita Brown MD, based on data collection and the provider's statements to me.     Provider Disclosure:   The documentation recorded by the scribe accurately reflects the services I personally performed and the decisions made by me. ILK injections were done by me.    Nita Brown MD  Professor and Chair  Department of Dermatology  [facility]: Phone: [phone], Fax:[phone]  [facility]: Phone: [phone], Fax: [phone]

## 2020-04-30 ENCOUNTER — TELEPHONE (OUTPATIENT)
Dept: DERMATOLOGY | Facility: CLINIC | Age: 65
End: 2020-04-30

## 2020-05-05 ENCOUNTER — VIRTUAL VISIT (OUTPATIENT)
Dept: DERMATOLOGY | Facility: CLINIC | Age: 65
End: 2020-05-05
Payer: COMMERCIAL

## 2020-05-05 DIAGNOSIS — L63.9 AA (ALOPECIA AREATA): ICD-10-CM

## 2020-05-05 DIAGNOSIS — L21.9 DERMATITIS, SEBORRHEIC: ICD-10-CM

## 2020-05-05 RX ORDER — FLUOCINONIDE TOPICAL SOLUTION USP, 0.05% 0.5 MG/ML
SOLUTION TOPICAL
Qty: 60 ML | Refills: 11 | Status: SHIPPED | OUTPATIENT
Start: 2020-05-05 | End: 2022-06-13

## 2020-05-05 NOTE — PATIENT INSTRUCTIONS
1. Alopecia areata  - Start fluocinonide solution PRN for itch.   - Continue clobetasol 0.05% shampoo 3-4x/wk, Head & Shoulders on other days. Advised patient to shampoo at least 4x/wk.  - Can continue fluocinolone 0.01% oil once weekly  - Continue topical minoxidil 5% foam daily  - Continue fexofenadine 180 mg daily (if patient is still having allergy symptoms with fexofenadine dose alone, may use cetirizine in combination.   - Continue clindamycin lotion PRN for lesions on occipital scalp.     Follow-up in 2-3 months, earlier for new or changing lesions.

## 2020-05-05 NOTE — LETTER
5/5/2020       RE: Maria C Goodman  [address] [place] [address]     Dear Colleague,    Thank you for referring your patient, Maria C Goodman, to the [facility] DERMATOLOGY at [facility]. Please see a copy of my visit note below.     HealthTeledermatology Record:  Store and Forward and Video ( Invitation sent by:  Jayna and send to e-mail at: [email or web address] )    - See note completed with Amanda NORTH and myself.    Nita Brown MD    [facility]       Impression and Recommendations (Patient Counseled on the Following):  1. Alopecia areata - slowly improving with evidence of hair regrowth  - Start fluocinonide 0.05% solution (Lidex) PRN for itch.   - Continue clobetasol 0.05% shampoo 3-4x/wk, Head & Shoulders on other days. Advised patient to shampoo at least 4x/wk.  - Can continue fluocinolone 0.01% oil once weekly as needed  - Continue topical minoxidil 5% foam daily  - Continue fexofenadine 180 mg daily and cetirizine nightly (if patient is still having allergy symptoms with fexofenadine dose alone, may use cetirizine in combination.  - Continue clindamycin lotion PRN for lesions on occipital scalp.     Follow-up:   Follow-up with dermatology in approximately 2-3 months. Earlier for new or changing lesions or rash.      Staff amd scribe:    Scribe Disclosure:  I, Amanda Lemus, am serving as a scribe to document services personally performed by Dr. Nita Brown MD, based on data collection and the provider's statements to me.     Provider Disclosure:   The documentation recorded by the scribe accurately reflects the services I personally performed and the decisions made by me.    Nita Brown MD  Professor and Chair  Department of Dermatology  [facility]: Phone: [phone], Fax:[phone]  [facility]: Phone: [phone],  Fax: [phone]            _____________________________________________________________________________    Dermatology Problem List:  1. Alopecia areata   -Current tx: topical minoxidil 5% foam daily, clobetasol 0.05% shampoo 3-4x/wk (Head & Shoulders other days), fluocinolone acetonide 0.01% oil once weekly,  clindamycin lotion PRN for lesions on occipital scalp, fexofenadine 180 mg daily, cetirizine 10 mg daily (this may replace her cetirizine 10 mg BID, however, if patient is still having allergy symptoms with fexofenadine dose alone, may use cetirizine in combination..  - s/p ILK 10 mg/mL ~3cc: 3/9/20, 7/2/19. Patient has history of urticarial appearing eruption on the face and arms. Last injections were 7/2/19 and were held since then. After injections on 3/9/20, patient reported a mild reaction on the face which lasted less than a day.   - Photodocumentation     2. Hand Dermatitis     3. Urticaria, cold aggravated/induced  -Current Tx: Xolair     4. Solar lentigines   -tretinoin 0.1% cream      5. Mild folliculitis posterior scalp  -Clindamycin 1% lotion BID as needed     6. Milia  - Start tretinoin 0.025% cream     7. History of of Non-specific urticarial appearing eruption of face   - Appears to be associated with ILK injections. Discussed that this has occurred with other patients. Patient will continue to monitor for reaction with any future ILK injections. If persists, review with Allergy/Endocrine as needed.    Encounter Date: May 5, 2020    CC:   Chief Complaint   Patient presents with     Derm Problem     Hair loss follow up. Maria C feels things are worse than when she was last seen.       History of Present Illness:  I have reviewed the teledermatology  information and the nursing intake corresponding to this issue. Maria C Goodman is a 65 year old female who presents via teledermatology for alopecia areata. The patient was last seen 3/9/20 when she continued her current regimen.    Today the patient  "reports her hair feels thinner since her last visit in March and that her scalp is itchier. She thinks she has more hair regrowth on the posterior scalp. She believes that ILK injections helped with hair regrowth in alopecia patches. She denies that she loses clumps of hair or that she has large alopecia patches. She denies any changes in eyebrows and eyelashes. Current treatment includes fluocinolone acetonide 0.01% oil once weekly, clobetasol 0.05% shampoo 3-4x/wk, and topical minoxidil 5% foam daily. To treat folliculitis on the posterior scalp, she uses clindamycin lotion PRN, around once weekly, and Head & Shoulders shampoo. She takes Allegra daily and cetirizine nightly year-round as she is very allergic. She uses Zolair injections to treat hives. In the spring she takes cetirizine BID due to seasonal allergies. She sees an allergist regularly and was last seen last week. She has undergone allergy testing. She reports her allergies are well-monitored and controlled. She uses Dove Sensitive Skin body wash and Cetaphil moisturizer on her body.     The patient has a history of urticarial-appearing eruption on the face and arms which was believed to be related to ILK injections. The patient reports after injections she experiences redness on the cheeks which first appear a day and a half after ILK injections are administered and last usually less than a day. She denies that her face is itchy. She notes her arms \"look sunburned\" regularly and that there was no change in her arms after ILK injections. The patient uses Cetaphil moisturizer on her body when her arms appear red. She has also been careful to cover her arms, wearing long-sleeves. She denies any reaction on her chest and back.     Otherwise the patient is feeling well without additional skin concerns at this time.     ROS: Patient is generally feeling well today.    Physical Examination:  General: Well-appearing, appropriately-developed individual.  Skin: " Focused examination of the scalp within the teledermatology photograph(s) was performed.   - Negative hair pull tests - done by patient  - When photos are compared to the last visit, there is increased hair growth in the ophiasis region  - Focal non-scarring areas of alopecia persist   - No significant scale or erythema appreciated      Labs:    Past Medical History:   Patient Active Problem List   Diagnosis     Alopecia areata     Dermatitis     Hypertrichosis     Urticaria     Autoimmune disease (H)     Dermatitis, seborrheic     Past Medical History:   Diagnosis Date     Asthma      Hypothyroidism      Lobular breast cancer (H)     s/p chemotherapy and radiation, in remission     Multiple allergies      No past surgical history on file.    Social History:      Family History:  Family History   Problem Relation Age of Onset     Rheumatoid Arthritis Mother      Cancer No family hx of         skin cancer     Skin Cancer No family hx of         no skin cancer       Medications:  Current Outpatient Medications   Medication     ALBUTEROL IN     Calcium Carbonate-Vitamin D (CALCIUM + D PO)     cetirizine (ZYRTEC) 10 MG tablet     Cholecalciferol (VITAMIN D) 1000 UNITS capsule     clindamycin (CLEOCIN T) 1 % external lotion     clobetasol (TEMOVATE) 0.05 % external cream     clobetasol (TEMOVATE) 0.05 % ointment     clobetasol propionate (CLOBEX) 0.05 % external shampoo     escitalopram (LEXAPRO) 20 MG tablet     EXEMESTANE PO     famotidine (PEPCID) 10 MG tablet     ferrous gluconate (FERGON) 324 (38 FE) MG tablet     Fluocinolone Acetonide Scalp (DERMA-SMOOTHE/FS SCALP) 0.01 % OIL oil     fluocinonide (LIDEX) 0.05 % external solution     ketoconazole (NIZORAL) 2 % shampoo     levothyroxine (SYNTHROID, LEVOTHROID) 112 MCG tablet     montelukast (SINGULAIR) 10 MG tablet     tacrolimus (PROTOPIC) 0.1 % ointment     tretinoin (RETIN-A) 0.025 % external cream     triamcinolone acetonide (KENALOG) 10 MG/ML injection      omalizumab (XOLAIR) 150 MG injection     Current Facility-Administered Medications   Medication     NONFORMULARY     triamcinolone acetonide (KENALOG-10) injection 30 mg          Allergies   Allergen Reactions     Acetaminophen Hives     Penicillins Hives     Shellfish Allergy GI Disturbance     Silver Nitrate Hives     tegaderm adhesive dressing      Sulfa Drugs Hives     Vicodin [Hydrocodone-Acetaminophen] Hives         _____________________________________________________________________________    Teledermatology information:  - Location of patient: Home  - Patient presented as: return  - Location of teledermatologist:  ([facility] DERMATOLOGY )  - Reason teledermatology is appropriate:  of National Emergency Regarding Coronavirus disease (COVID 19) Outbreak  - Method of transmission:  Store and Forward and Telephone  - Image quality and interpretability: acceptable  - Physician has received verbal consent for a Video/Photos Visit from the patient? Yes  - In-person dermatology visit recommendation: no  - Date of images: 5/3/20  - Service start time: 1:40  - Service end time: 1:58  - Date of report: 05/05/20    Again, thank you for allowing me to participate in the care of your patient.      Sincerely,    Nita Brown MD

## 2020-05-05 NOTE — PROGRESS NOTES
[facility] Health[facility]eledermatology Record:  Store and Forward and Video ( Invitation sent by:  Jayna and send to e-mail at: [email or web address] )    - See note completed with Amanda NORTH and myself.    Nita Brown MD

## 2020-05-05 NOTE — PROGRESS NOTES
[facility] Record       Impression and Recommendations (Patient Counseled on the Following):  1. Alopecia areata - slowly improving with evidence of hair regrowth  - Start fluocinonide 0.05% solution (Lidex) PRN for itch.   - Continue clobetasol 0.05% shampoo 3-4x/wk, Head & Shoulders on other days. Advised patient to shampoo at least 4x/wk.  - Can continue fluocinolone 0.01% oil once weekly as needed  - Continue topical minoxidil 5% foam daily  - Continue fexofenadine 180 mg daily and cetirizine nightly (if patient is still having allergy symptoms with fexofenadine dose alone, may use cetirizine in combination.  - Continue clindamycin lotion PRN for lesions on occipital scalp.     Follow-up:   Follow-up with dermatology in approximately 2-3 months. Earlier for new or changing lesions or rash.      Staff amd scribe:    Scribe Disclosure:  I, Amanda Lemus, am serving as a scribe to document services personally performed by Dr. Nita Brown MD, based on data collection and the provider's statements to me.     Provider Disclosure:   The documentation recorded by the scribe accurately reflects the services I personally performed and the decisions made by me.    Nita Brown MD  Professor and Chair  Department of Dermatology  [facility]: Phone: [phone], Fax:[phone]  [facility]: Phone: [phone], Fax: [phone]            _____________________________________________________________________________    Dermatology Problem List:  1. Alopecia areata   -Current tx: topical minoxidil 5% foam daily, clobetasol 0.05% shampoo 3-4x/wk (Head & Shoulders other days), fluocinolone acetonide 0.01% oil once weekly,  clindamycin lotion PRN for lesions on occipital scalp, fexofenadine 180 mg daily, cetirizine 10 mg daily (this may replace her cetirizine 10 mg BID, however, if patient is still  having allergy symptoms with fexofenadine dose alone, may use cetirizine in combination..  - s/p ILK 10 mg/mL ~3cc: 3/9/20, 7/2/19. Patient has history of urticarial appearing eruption on the face and arms. Last injections were 7/2/19 and were held since then. After injections on 3/9/20, patient reported a mild reaction on the face which lasted less than a day.   - Photodocumentation     2. Hand Dermatitis     3. Urticaria, cold aggravated/induced  -Current Tx: Xolair     4. Solar lentigines   -tretinoin 0.1% cream      5. Mild folliculitis posterior scalp  -Clindamycin 1% lotion BID as needed     6. Milia  - Start tretinoin 0.025% cream     7. History of of Non-specific urticarial appearing eruption of face   - Appears to be associated with ILK injections. Discussed that this has occurred with other patients. Patient will continue to monitor for reaction with any future ILK injections. If persists, review with Allergy/Endocrine as needed.    Encounter Date: May 5, 2020    CC:   Chief Complaint   Patient presents with     Derm Problem     Hair loss follow up. Maria C feels things are worse than when she was last seen.       History of Present Illness:  I have reviewed the teledermatology  information and the nursing intake corresponding to this issue. Maria C Goodman is a 65 year old female who presents via teledermatology for alopecia areata. The patient was last seen 3/9/20 when she continued her current regimen.    Today the patient reports her hair feels thinner since her last visit in March and that her scalp is itchier. She thinks she has more hair regrowth on the posterior scalp. She believes that ILK injections helped with hair regrowth in alopecia patches. She denies that she loses clumps of hair or that she has large alopecia patches. She denies any changes in eyebrows and eyelashes. Current treatment includes fluocinolone acetonide 0.01% oil once weekly, clobetasol 0.05% shampoo 3-4x/wk, and topical minoxidil 5%  "foam daily. To treat folliculitis on the posterior scalp, she uses clindamycin lotion PRN, around once weekly, and Head & Shoulders shampoo. She takes Allegra daily and cetirizine nightly year-round as she is very allergic. She uses Zolair injections to treat hives. In the spring she takes cetirizine BID due to seasonal allergies. She sees an allergist regularly and was last seen last week. She has undergone allergy testing. She reports her allergies are well-monitored and controlled. She uses Dove Sensitive Skin body wash and Cetaphil moisturizer on her body.     The patient has a history of urticarial-appearing eruption on the face and arms which was believed to be related to ILK injections. The patient reports after injections she experiences redness on the cheeks which first appear a day and a half after ILK injections are administered and last usually less than a day. She denies that her face is itchy. She notes her arms \"look sunburned\" regularly and that there was no change in her arms after ILK injections. The patient uses Cetaphil moisturizer on her body when her arms appear red. She has also been careful to cover her arms, wearing long-sleeves. She denies any reaction on her chest and back.     Otherwise the patient is feeling well without additional skin concerns at this time.     ROS: Patient is generally feeling well today.    Physical Examination:  General: Well-appearing, appropriately-developed individual.  Skin: Focused examination of the scalp within the teledermatology photograph(s) was performed.   - Negative hair pull tests - done by patient  - When photos are compared to the last visit, there is increased hair growth in the ophiasis region  - Focal non-scarring areas of alopecia persist   - No significant scale or erythema appreciated      Labs:    Past Medical History:   Patient Active Problem List   Diagnosis     Alopecia areata     Dermatitis     Hypertrichosis     Urticaria     Autoimmune " disease (H)     Dermatitis, seborrheic     Past Medical History:   Diagnosis Date     Asthma      Hypothyroidism      Lobular breast cancer (H)     s/p chemotherapy and radiation, in remission     Multiple allergies      No past surgical history on file.    Social History:      Family History:  Family History   Problem Relation Age of Onset     Rheumatoid Arthritis Mother      Cancer No family hx of         skin cancer     Skin Cancer No family hx of         no skin cancer       Medications:  Current Outpatient Medications   Medication     ALBUTEROL IN     Calcium Carbonate-Vitamin D (CALCIUM + D PO)     cetirizine (ZYRTEC) 10 MG tablet     Cholecalciferol (VITAMIN D) 1000 UNITS capsule     clindamycin (CLEOCIN T) 1 % external lotion     clobetasol (TEMOVATE) 0.05 % external cream     clobetasol (TEMOVATE) 0.05 % ointment     clobetasol propionate (CLOBEX) 0.05 % external shampoo     escitalopram (LEXAPRO) 20 MG tablet     EXEMESTANE PO     famotidine (PEPCID) 10 MG tablet     ferrous gluconate (FERGON) 324 (38 FE) MG tablet     Fluocinolone Acetonide Scalp (DERMA-SMOOTHE/FS SCALP) 0.01 % OIL oil     fluocinonide (LIDEX) 0.05 % external solution     ketoconazole (NIZORAL) 2 % shampoo     levothyroxine (SYNTHROID, LEVOTHROID) 112 MCG tablet     montelukast (SINGULAIR) 10 MG tablet     tacrolimus (PROTOPIC) 0.1 % ointment     tretinoin (RETIN-A) 0.025 % external cream     triamcinolone acetonide (KENALOG) 10 MG/ML injection     omalizumab (XOLAIR) 150 MG injection     Current Facility-Administered Medications   Medication     NONFORMULARY     triamcinolone acetonide (KENALOG-10) injection 30 mg          Allergies   Allergen Reactions     Acetaminophen Hives     Penicillins Hives     Shellfish Allergy GI Disturbance     Silver Nitrate Hives     tegaderm adhesive dressing      Sulfa Drugs Hives     Vicodin [Hydrocodone-Acetaminophen] Hives          _____________________________________________________________________________    Teledermatology information:  - Location of patient: Home  - Patient presented as: return  - Location of teledermatologist:  ([facility] DERMATOLOGY )  - Reason teledermatology is appropriate:  of National Emergency Regarding Coronavirus disease (COVID 19) Outbreak  - Method of transmission:  Store and Forward and Telephone  - Image quality and interpretability: acceptable  - Physician has received verbal consent for a Video/Photos Visit from the patient? Yes  - In-person dermatology visit recommendation: no  - Date of images: 5/3/20  - Service start time: 1:40  - Service end time: 1:58  - Date of report: 05/05/20

## 2020-06-25 DIAGNOSIS — L63.9 AA (ALOPECIA AREATA): ICD-10-CM

## 2020-06-25 DIAGNOSIS — L21.9 DERMATITIS, SEBORRHEIC: ICD-10-CM

## 2020-06-30 RX ORDER — FLUOCINOLONE ACETONIDE 0.11 MG/ML
OIL TOPICAL
Qty: 118.28 ML | Refills: 5 | Status: SHIPPED | OUTPATIENT
Start: 2020-06-30 | End: 2022-01-31

## 2020-06-30 NOTE — TELEPHONE ENCOUNTER
"Fluocinolone Acetonide Scalp (DERMA-SMOOTHE/FS SCALP) 0.01 % OIL oil   Last Written Prescription Date:  2/19/19  Last Fill Quantity: 1 bottle,   # refills: 11  Last Office Visit : 5/5/20  Future Office visit:  9/15/20    \" Can continue fluocinolone 0.01% oil once weekly as needed\"      "

## 2020-09-15 ENCOUNTER — OFFICE VISIT (OUTPATIENT)
Dept: DERMATOLOGY | Facility: CLINIC | Age: 65
End: 2020-09-15
Payer: COMMERCIAL

## 2020-09-15 DIAGNOSIS — L63.9 ALOPECIA AREATA: Primary | ICD-10-CM

## 2020-09-15 RX ORDER — ESCITALOPRAM OXALATE 10 MG/1
15 TABLET ORAL
COMMUNITY
Start: 2020-09-08

## 2020-09-15 ASSESSMENT — PAIN SCALES - GENERAL: PAINLEVEL: NO PAIN (0)

## 2020-09-15 NOTE — NURSING NOTE
Dermatology Rooming Note    Maria C Goodman's goals for this visit include:   Chief Complaint   Patient presents with     Hair Loss     Maria C is here today for a 3 month hair loss follow up      AMANDA Izquierdo

## 2020-09-15 NOTE — PATIENT INSTRUCTIONS
I injected the remaining troublesome areas for your alopecia in clinic today. Your exam is reassuring, with a significant amount of hair regrowth reported since you were last seen.    For your eczema I recommend tapering the clobetasol once daily for one week prior to discontinuing altogether. Continue the remainder of your hair medications as prescribed.

## 2020-09-15 NOTE — PROGRESS NOTES
Drug Administration Record    Prior to injection, verified patient identity using patient's name and date of birth.  Due to injection administration, patient instructed to remain in clinic for 15 minutes  afterwards, and to report any adverse reaction to me immediately.    Drug Name: triamcinolone acetonide(kenalog)  Dose: 2mL of triamcinolone 10mg/mL, 20mg dose  Route administered: ID  NDC #: Kenalog-10 (7968-6552-62)  Amount of waste(mL):3  Reason for waste: Single use vial    LOT #: [id number]  SITE: Skin  : [employer]  EXPIRATION DATE: 3/2022

## 2020-09-15 NOTE — PROGRESS NOTES
HairMetrix Overview      Frontal anterior    Midscalp    Vertex    Occipital    Right temporal    Left temporal

## 2020-09-15 NOTE — LETTER
9/15/2020       RE: Maria C Goodman  [address] [place] [address]     Dear Colleague,    Thank you for referring your patient, Maria C Goodman, to the [facility] DERMATOLOGY at [facility]. Please see a copy of my visit note below.    [facility] Dermatology Note    Dermatology Problem List:  1. Alopecia areata  - Lidex solution, Clobex shampoo, H&S shampoo, Derma-Smoothe oil, minoxidil foam  - Allegra 180 mg morning, cetirizine at night  - clinda lotn PRN  - s/p ILK 9/15/20    Encounter Date: Sep 15, 2020    CC:  Chief Complaint   Patient presents with     Hair Loss     Maria C is here today for a 3 month hair loss follow up      History of Present Illness:  Ms. Maria C Goodman is a 65 year old female who presents as a follow-up for an alopecia areata hair loss follow up. The patient was last seen on 5/5/20 (4 months ago) through telemedicine regarding her AA.    Since she was last seen in clinic, she states that she has been able to go all summer without a wig, and believes her hair growth has subjectively improved (noting less of the small and fine hairs). The nape of her neck appears to be responding the least, with persistent patches most visible when she pulls her hair up. She is still experiencing some itching, though the Lidex solution (she reports using 1x weekly) added at her last visit does appear to be helping. She denies experiencing any flaking, redness, or tenderness on her scalp. No changes in her eyelashes and eyebrows - she states that they are thin but present.     She reports shampooing her hair at least 3-4 times weekly, improved from earlier this spring. Has been using the minoxidil foam 5x weekly instead of daily. She denies needing to use the clindamycin lotion over the past 4 months, and states that the sores on her scalp appear well controlled. No notable changes to her medications.     Unrelated to her scalp, she reports experiencing  patches of eczema on her elbows, left shoulder blade, knees, and popliteal fossa. She has been applying clobetasol 0.05% cream twice daily for the active areas for 1-2 months in duration, with notable improvement in the left shoulder blade and resolution on the knees. Her elbow eczema has still been persistent, and she believes this may have originated from her chronic hives (cold and pressure induced, well controlled with allergist). She reports experiencing prior episodes of this, and reports having a history of asthma and seasonal allergies. She denies reporting any particular stressors beyond the COVID-19 pandemic. Her diet has been consistent since she was last seen. No recent illness, fevers, chills, or further complaints or concerns noted at this time.     Past Medical History:   Patient Active Problem List   Diagnosis     Alopecia areata     Dermatitis     Hypertrichosis     Urticaria     Autoimmune disease (H)     Dermatitis, seborrheic     Past Medical History:   Diagnosis Date     Asthma      Hypothyroidism      Lobular breast cancer (H)     s/p chemotherapy and radiation, in remission     Multiple allergies      History reviewed. No pertinent surgical history.    Social History:  Patient reports that she has never smoked. She has never used smokeless tobacco.    Family History:  Family History   Problem Relation Age of Onset     Rheumatoid Arthritis Mother      Cancer No family hx of         skin cancer     Skin Cancer No family hx of         no skin cancer     Medications:  Current Outpatient Medications   Medication Sig Dispense Refill     ALBUTEROL IN Inhale into the lungs daily as needed       Calcium Carbonate-Vitamin D (CALCIUM + D PO) Take by mouth daily       cetirizine (ZYRTEC) 10 MG tablet Take 10 mg by mouth daily       Cholecalciferol (VITAMIN D) 1000 UNITS capsule Take 5,000 Units by mouth daily       clindamycin (CLEOCIN T) 1 % external lotion Apply topically 2 times daily Apply to back of  scalp 60 mL 1     clobetasol (TEMOVATE) 0.05 % external cream Apply a fine layer to scalp in the morning after shampooing, a tube should last 2 months 60 g 1     clobetasol (TEMOVATE) 0.05 % ointment Apply a fine layer to the scalp at night after application of Rogaine, shampoo in am. A tube should last 2 months. 60 g 1     clobetasol propionate (CLOBEX) 0.05 % external shampoo APPLY TOPICALLY TO A DRY SCALP, LEAVE ON FOR 10-15 MINUTES, THEN LATHER AND RINSE. DO THIS EVERY OTHER  mL 11     escitalopram (LEXAPRO) 10 MG tablet Take 15 mg by mouth       EXEMESTANE PO Take by mouth daily       famotidine (PEPCID) 10 MG tablet TK 1 T PO QD  1     Fluocinolone Acetonide Scalp 0.01 % OIL oil Apply topically once a week Use once a week. Apply 1-2 capfuls to dry scalp, massage in and leave on overnight, wash out in the morning 118.28 mL 5     fluocinonide (LIDEX) 0.05 % external solution Use to treat any areas on the scalp that are itchy as needed. Use twice daily to any areas of complete hair loss. 60 mL 11     ketoconazole (NIZORAL) 2 % shampoo Apply to scalp, lather, then rinse, do every other day alternating with Head & Shoulders 120 mL 5     levothyroxine (SYNTHROID, LEVOTHROID) 112 MCG tablet Take by mouth daily       montelukast (SINGULAIR) 10 MG tablet Take 10 mg by mouth At Bedtime       tacrolimus (PROTOPIC) 0.1 % ointment Apply topically 2 times daily 60 g 1     tretinoin (RETIN-A) 0.025 % external cream Apply a pea size amount to face daily as tolerated - ok for night time application 45 g 1     triamcinolone acetonide (KENALOG) 10 MG/ML injection See med note 5 mL 0     ferrous gluconate (FERGON) 324 (38 FE) MG tablet One tablet daily (Patient not taking: Reported on 9/15/2020) 60 tablet 1     omalizumab (XOLAIR) 150 MG injection Inject 300 mg Subcutaneous       Allergies   Allergen Reactions     Acetaminophen Hives     Penicillins Hives     Shellfish Allergy GI Disturbance     Silver Nitrate Hives      tegaderm adhesive dressing      Sulfa Drugs Hives     Vicodin [Hydrocodone-Acetaminophen] Hives     Review of Systems:  -As per HPI  -Constitutional: Otherwise feeling well today, in usual state of health.  -Skin: As above in HPI. No additional skin concerns.    Physical exam:  Vitals: There were no vitals taken for this visit.  GEN: This is a well developed, well-nourished female in no acute distress, in a pleasant mood.    SKIN: Focused examination of the scalp, face and arms was performed.  - skin type: light-skinned  - scalp: scattered alopecic patches  - hair pull test: negative  - no erythema or scale  - significant greasy scalp throughout scalp, mild erythema  - perifollicular hyperkeratosis present diffusely  - diffuse loss of hair density  - eyebrows and eyelashes normal  - part width of 1.1-1.2 was present  - frontal: 2-3 cm  - layer 1: 1-2 cm, robust  - layer 2: 5-6 cm  - layer 3: 9-10 cm  - layer 4: 14 cm  -examination of arms/elbows  - no active dermatitis noted    Impression/Plan:  1. Alopecia areata, slowly improving with evidence of hair regrowth  - Kenalog intralesional injection procedure note: after verbal consent and discussion of risks including but not limited to atrophy, pain, and bruising, time out was performed, patient positioned, area cleaned with alcohol, 1.7 ml Kenalog injected into 20 sites on the patches along the hair part and nape of the neck; patient tolerated procedure well  - Continue regimen:   - fluocinonide 0.05% solution (Lidex) PRN   - clobetasol 0.05% shampoo 3-4x/wk   - fluocinolone 0.01% oil once weekly PRN   - minoxidil 5% foam daily   - [id number] mg in the morning and cetirizine nightly    - clindamycin lotion PRN for lesions on occipital scalp.    2. Dermatitis on elbows, nearly resolved  - Recommend patient taper clobetasol daily for one week, then discontinue altogether     CC Referred Self, MD  No address on file on close of this encounter.  Follow-up in 4 months,  earlier for new or changing lesions.     Staff Involved:  I, Monik Parsons, saw and examined the patient in the presence of Dr. Brown.      Staff Physician:  I was present with the medical student who participated in the service and in the documentation of the note. I have verified the history and personally performed the physical exam and medical decision making. I agree with the assessment and plan of care as documented in the note.  ILK injections were primarily done by me.      Nita Brown MD  Professor and Chair  Department of Dermatology  [facility]: Phone: [phone], Fax:[phone]  [facility]: Phone: [phone], Fax: [phone]          Drug Administration Record    Prior to injection, verified patient identity using patient's name and date of birth.  Due to injection administration, patient instructed to remain in clinic for 15 minutes  afterwards, and to report any adverse reaction to me immediately.    Drug Name: triamcinolone acetonide(kenalog)  Dose: 2mL of triamcinolone 10mg/mL, 20mg dose  Route administered: ID  NDC #: Kenalog-10 (4671-3729-67)  Amount of waste(mL):3  Reason for waste: Single use vial    LOT #: [id number]  SITE: Skin  : [email or web address]  EXPIRATION DATE: 3/2022      HairMetrix Overview      Frontal anterior    Midscalp    Vertex    Occipital    Right temporal    Left temporal        Again, thank you for allowing me to participate in the care of your patient.      Sincerely,    Nita Brown MD

## 2020-09-15 NOTE — PROGRESS NOTES
[facility] Dermatology Note    Dermatology Problem List:  1. Alopecia areata  - Lidex solution, Clobex shampoo, H&S shampoo, Derma-Smoothe oil, minoxidil foam  - Allegra 180 mg morning, cetirizine at night  - clinda lotn PRN  - s/p ILK 9/15/20    Encounter Date: Sep 15, 2020    CC:  Chief Complaint   Patient presents with     Hair Loss     Maria C is here today for a 3 month hair loss follow up      History of Present Illness:  Ms. Maria C Goodman is a 65 year old female who presents as a follow-up for an alopecia areata hair loss follow up. The patient was last seen on 5/5/20 (4 months ago) through telemedicine regarding her AA.    Since she was last seen in clinic, she states that she has been able to go all summer without a wig, and believes her hair growth has subjectively improved (noting less of the small and fine hairs). The nape of her neck appears to be responding the least, with persistent patches most visible when she pulls her hair up. She is still experiencing some itching, though the Lidex solution (she reports using 1x weekly) added at her last visit does appear to be helping. She denies experiencing any flaking, redness, or tenderness on her scalp. No changes in her eyelashes and eyebrows - she states that they are thin but present.     She reports shampooing her hair at least 3-4 times weekly, improved from earlier this spring. Has been using the minoxidil foam 5x weekly instead of daily. She denies needing to use the clindamycin lotion over the past 4 months, and states that the sores on her scalp appear well controlled. No notable changes to her medications.     Unrelated to her scalp, she reports experiencing patches of eczema on her elbows, left shoulder blade, knees, and popliteal fossa. She has been applying clobetasol 0.05% cream twice daily for the active areas for 1-2 months in duration, with notable improvement in the left shoulder blade and resolution on the knees. Her elbow  eczema has still been persistent, and she believes this may have originated from her chronic hives (cold and pressure induced, well controlled with allergist). She reports experiencing prior episodes of this, and reports having a history of asthma and seasonal allergies. She denies reporting any particular stressors beyond the COVID-19 pandemic. Her diet has been consistent since she was last seen. No recent illness, fevers, chills, or further complaints or concerns noted at this time.     Past Medical History:   Patient Active Problem List   Diagnosis     Alopecia areata     Dermatitis     Hypertrichosis     Urticaria     Autoimmune disease (H)     Dermatitis, seborrheic     Past Medical History:   Diagnosis Date     Asthma      Hypothyroidism      Lobular breast cancer (H)     s/p chemotherapy and radiation, in remission     Multiple allergies      History reviewed. No pertinent surgical history.    Social History:  Patient reports that she has never smoked. She has never used smokeless tobacco.    Family History:  Family History   Problem Relation Age of Onset     Rheumatoid Arthritis Mother      Cancer No family hx of         skin cancer     Skin Cancer No family hx of         no skin cancer     Medications:  Current Outpatient Medications   Medication Sig Dispense Refill     ALBUTEROL IN Inhale into the lungs daily as needed       Calcium Carbonate-Vitamin D (CALCIUM + D PO) Take by mouth daily       cetirizine (ZYRTEC) 10 MG tablet Take 10 mg by mouth daily       Cholecalciferol (VITAMIN D) 1000 UNITS capsule Take 5,000 Units by mouth daily       clindamycin (CLEOCIN T) 1 % external lotion Apply topically 2 times daily Apply to back of scalp 60 mL 1     clobetasol (TEMOVATE) 0.05 % external cream Apply a fine layer to scalp in the morning after shampooing, a tube should last 2 months 60 g 1     clobetasol (TEMOVATE) 0.05 % ointment Apply a fine layer to the scalp at night after application of Rogaine, shampoo  in am. A tube should last 2 months. 60 g 1     clobetasol propionate (CLOBEX) 0.05 % external shampoo APPLY TOPICALLY TO A DRY SCALP, LEAVE ON FOR 10-15 MINUTES, THEN LATHER AND RINSE. DO THIS EVERY OTHER  mL 11     escitalopram (LEXAPRO) 10 MG tablet Take 15 mg by mouth       EXEMESTANE PO Take by mouth daily       famotidine (PEPCID) 10 MG tablet TK 1 T PO QD  1     Fluocinolone Acetonide Scalp 0.01 % OIL oil Apply topically once a week Use once a week. Apply 1-2 capfuls to dry scalp, massage in and leave on overnight, wash out in the morning 118.28 mL 5     fluocinonide (LIDEX) 0.05 % external solution Use to treat any areas on the scalp that are itchy as needed. Use twice daily to any areas of complete hair loss. 60 mL 11     ketoconazole (NIZORAL) 2 % shampoo Apply to scalp, lather, then rinse, do every other day alternating with Head & Shoulders 120 mL 5     levothyroxine (SYNTHROID, LEVOTHROID) 112 MCG tablet Take by mouth daily       montelukast (SINGULAIR) 10 MG tablet Take 10 mg by mouth At Bedtime       tacrolimus (PROTOPIC) 0.1 % ointment Apply topically 2 times daily 60 g 1     tretinoin (RETIN-A) 0.025 % external cream Apply a pea size amount to face daily as tolerated - ok for night time application 45 g 1     triamcinolone acetonide (KENALOG) 10 MG/ML injection See med note 5 mL 0     ferrous gluconate (FERGON) 324 (38 FE) MG tablet One tablet daily (Patient not taking: Reported on 9/15/2020) 60 tablet 1     omalizumab (XOLAIR) 150 MG injection Inject 300 mg Subcutaneous       Allergies   Allergen Reactions     Acetaminophen Hives     Penicillins Hives     Shellfish Allergy GI Disturbance     Silver Nitrate Hives     tegaderm adhesive dressing      Sulfa Drugs Hives     Vicodin [Hydrocodone-Acetaminophen] Hives     Review of Systems:  -As per HPI  -Constitutional: Otherwise feeling well today, in usual state of health.  -Skin: As above in HPI. No additional skin concerns.    Physical  exam:  Vitals: There were no vitals taken for this visit.  GEN: This is a well developed, well-nourished female in no acute distress, in a pleasant mood.    SKIN: Focused examination of the scalp, face and arms was performed.  - skin type: light-skinned  - scalp: scattered alopecic patches  - hair pull test: negative  - no erythema or scale  - significant greasy scalp throughout scalp, mild erythema  - perifollicular hyperkeratosis present diffusely  - diffuse loss of hair density  - eyebrows and eyelashes normal  - part width of 1.1-1.2 was present  - frontal: 2-3 cm  - layer 1: 1-2 cm, robust  - layer 2: 5-6 cm  - layer 3: 9-10 cm  - layer 4: 14 cm  -examination of arms/elbows  - no active dermatitis noted    Impression/Plan:  1. Alopecia areata, slowly improving with evidence of hair regrowth  - Kenalog intralesional injection procedure note: after verbal consent and discussion of risks including but not limited to atrophy, pain, and bruising, time out was performed, patient positioned, area cleaned with alcohol, 1.7 ml Kenalog injected into 20 sites on the patches along the hair part and nape of the neck; patient tolerated procedure well  - Continue regimen:   - fluocinonide 0.05% solution (Lidex) PRN   - clobetasol 0.05% shampoo 3-4x/wk   - fluocinolone 0.01% oil once weekly PRN   - minoxidil 5% foam daily   - [id number] mg in the morning and cetirizine nightly    - clindamycin lotion PRN for lesions on occipital scalp.    2. Dermatitis on elbows, nearly resolved  - Recommend patient taper clobetasol daily for one week, then discontinue altogether     CC Referred Self, MD  No address on file on close of this encounter.  Follow-up in 4 months, earlier for new or changing lesions.     Staff Involved:  Monik EWING, saw and examined the patient in the presence of Dr. Brown.      Staff Physician:  I was present with the medical student who participated in the service and in the documentation of the note. I  have verified the history and personally performed the physical exam and medical decision making. I agree with the assessment and plan of care as documented in the note.  ILK injections were primarily done by me.      Nita Brown MD  Professor and Chair  Department of Dermatology  [facility]: Phone: [phone], Fax:[phone]  [facility]: Phone: [phone], Fax: [phone]

## 2020-12-06 ENCOUNTER — HEALTH MAINTENANCE LETTER (OUTPATIENT)
Age: 65
End: 2020-12-06

## 2021-01-12 ENCOUNTER — OFFICE VISIT (OUTPATIENT)
Dept: DERMATOLOGY | Facility: CLINIC | Age: 66
End: 2021-01-12
Payer: COMMERCIAL

## 2021-01-12 DIAGNOSIS — L21.9 DERMATITIS, SEBORRHEIC: ICD-10-CM

## 2021-01-12 DIAGNOSIS — L63.9 ALOPECIA AREATA: Primary | ICD-10-CM

## 2021-01-12 DIAGNOSIS — M35.9 AUTOIMMUNE DISEASE (H): ICD-10-CM

## 2021-01-12 PROCEDURE — 11901 INJECT SKIN LESIONS >7: CPT | Mod: GC | Performed by: DERMATOLOGY

## 2021-01-12 PROCEDURE — 99213 OFFICE O/P EST LOW 20 MIN: CPT | Mod: 25 | Performed by: DERMATOLOGY

## 2021-01-12 ASSESSMENT — PAIN SCALES - GENERAL: PAINLEVEL: NO PAIN (0)

## 2021-01-12 NOTE — LETTER
1/12/2021       RE: Maria C Goodman  [address] [place] [address]     Dear Colleague,    Thank you for referring your patient, Maria C Goodman, to the [facility] [place] at [facility]. Please see a copy of my visit note below.    [facility] Dermatology Note  Encounter Date: Jan 12, 2021  Office Visit     Dermatology Problem List:  1. Alopecia areata  - Lidex solution, Clobex shampoo, H&S shampoo, Derma-Smoothe oil, minoxidil foam  - Allegra 180 mg morning, cetirizine at night  - clinda lotn PRN  - s/p ILK most recently on 1/12/21  - HairMetrix 9/15/20 - plan for repeat HairMetrix at next visit  2. Psoriasis, managed by outside derm, well controlled on clobetasol oint prn    ____________________________________________    Assessment & Plan:  1. Alopecia areata  *Reviewed prior labs.  Improved on current regimen. Having occasional scalp pruritus but controlled with PRN Lidex soln  - Continue current regimen:              - fluocinonide 0.05% solution (Lidex) PRN              - clobetasol 0.05% shampoo 3-4x/wk              - fluocinolone 0.01% oil once weekly PRN              - minoxidil 5% foam up to daily (patient doing 3-4 times per week)              - Allegra 180 mg in the morning and cetirizine nightly               - clindamycin lotion PRN for lesions on occipital scalp.  - plan for repeat HairMetrix at next visit  - ILK-10, 2 ml total today     Procedures Performed:   - Intra-lesional triamcinolone procedure note: After positioning and cleansing with isopropyl alcohol, 2 total mL of triamcinolone 10 mg/mL was injected into 20 sites on the scalp. The patient tolerated the procedure well and left the dermatology clinic in good condition.    Follow-up: in 4 months    Staff and Resident:   Meka Bowling MD, Dermatology Resident, PGY-2    Patient was seen and examined with the dermatology resident.  I agree with the history, review of systems, physical examination, assessments and plan. ILK injections were done  together.    Nita Brown MD  Professor and  Chair  Department of Dermatology  [facility]  ____________________________________________    CC: Hair Loss (Maria C, is here for hairloss. )      HPI:  Ms. Maria C Goodman is a 65 year old female who presents today as a return patient for follow-up on alopecia areata. Feels that there has been much improvement since last time.  Current regimen includes:       - fluocinonide 0.05% solution (Lidex) PRN - helpful for the occasional itchiness   - clobetasol 0.05% shampoo 3-4x/wk  - fluocinolone 0.01% oil once weekly PRN  - minoxidil 5% foam 3-4 times per week  - Allegra 180 mg in the morning and cetirizine nightly   - clindamycin lotion PRN for lesions on occipital scalp. - says she hasn't needed this in a while    Denies burning of the scalp. Denies changes in eyebrows or eye lashes. Denies changes in nails.     Reports that since last visit she was diagnosed with psoriasis but her other general dermatologist. Reports it was just a small patch on the leg and it was controlled with clobetasol ointment.    Patient is otherwise feeling well, without additional concerns.    ROS: As per HPI    Labs:  CBC , CMP , Iron studies , TSH and Vitamin D reviewed.    Physical Exam:  Vitals: There were no vitals taken for this visit.  SKIN: Focused examination of the scalp and face was performed.  - scattered alopecic patches on frontal, occipital, and R/L temporal scalp - overall improved regrowth noted in these areas in comparison to prior photos  - very minimal patchy erythema on frontal and occipital scalp - improved compared to prior photos  - no scale evident   - eyebrows and eyelashes normal  - negative hair pull tests    Medications:  Current Outpatient Medications   Medication     ALBUTEROL IN     Calcium Carbonate-Vitamin D (CALCIUM + D PO)     cetirizine  (ZYRTEC) 10 MG tablet     Cholecalciferol (VITAMIN D) 1000 UNITS capsule     clindamycin (CLEOCIN T) 1 % external lotion     clobetasol (TEMOVATE) 0.05 % external cream     clobetasol (TEMOVATE) 0.05 % ointment     clobetasol propionate (CLOBEX) 0.05 % external shampoo     escitalopram (LEXAPRO) 10 MG tablet     EXEMESTANE PO     famotidine (PEPCID) 10 MG tablet     ferrous gluconate (FERGON) 324 (38 FE) MG tablet     Fluocinolone Acetonide Scalp 0.01 % OIL oil     fluocinonide (LIDEX) 0.05 % external solution     ketoconazole (NIZORAL) 2 % shampoo     levothyroxine (SYNTHROID, LEVOTHROID) 112 MCG tablet     montelukast (SINGULAIR) 10 MG tablet     omalizumab (XOLAIR) 150 MG injection     tacrolimus (PROTOPIC) 0.1 % ointment     tretinoin (RETIN-A) 0.025 % external cream     triamcinolone acetonide (KENALOG) 10 MG/ML injection     Current Facility-Administered Medications   Medication     NONFORMULARY     triamcinolone acetonide (KENALOG-10) injection 10 mg     triamcinolone acetonide (KENALOG-10) injection 30 mg      Past Medical/Surgical History:   Patient Active Problem List   Diagnosis     Alopecia areata     Dermatitis     Hypertrichosis     Urticaria     Autoimmune disease (H)     Dermatitis, seborrheic     Past Medical History:   Diagnosis Date     Asthma      Hypothyroidism      Lobular breast cancer (H)     s/p chemotherapy and radiation, in remission     Multiple allergies        CC Referred Self, MD  No address on file on close of this encounter.      Drug Administration Record    Prior to injection, verified patient identity using patient's name and date of birth.  Due to injection administration, patient instructed to remain in clinic for 15 minutes  afterwards, and to report any adverse reaction to me immediately.    Drug Name: triamcinolone acetonide(kenalog)  Dose: 2mL of triamcinolone 10mg/mL, 20mg dose  Route administered: ID  NDC #: Kenalog-10 (1672-3410-79)  Amount of waste(mL):3  Reason for  waste: Multi dose vial    LOT #: [id number]  SITE: scalp  : [employer]  EXPIRATION DATE: 04/22      Again, thank you for allowing me to participate in the care of your patient.      Sincerely,    Nita Brown MD

## 2021-01-12 NOTE — NURSING NOTE
Chief Complaint   Patient presents with     Hair Loss     Maria C, is here for hairloss.       Robin Florentino EMT

## 2021-01-12 NOTE — PROGRESS NOTES
[facility] Dermatology Note  Encounter Date: Jan 12, 2021  Office Visit     Dermatology Problem List:  1. Alopecia areata  - Lidex solution, Clobex shampoo, H&S shampoo, Derma-Smoothe oil, minoxidil foam  - Allegra 180 mg morning, cetirizine at night  - clinda lotn PRN  - s/p ILK most recently on 1/12/21  - HairMetrix 9/15/20 - plan for repeat HairMetrix at next visit  2. Psoriasis, managed by outside derm, well controlled on clobetasol oint prn    ____________________________________________    Assessment & Plan:  1. Alopecia areata  *Reviewed prior labs.  Improved on current regimen. Having occasional scalp pruritus but controlled with PRN Lidex soln  - Continue current regimen:              - fluocinonide 0.05% solution (Lidex) PRN              - clobetasol 0.05% shampoo 3-4x/wk              - fluocinolone 0.01% oil once weekly PRN              - minoxidil 5% foam up to daily (patient doing 3-4 times per week)              - Allegra 180 mg in the morning and cetirizine nightly               - clindamycin lotion PRN for lesions on occipital scalp.  - plan for repeat HairMetrix at next visit  - ILK-10, 2 ml total today     Procedures Performed:   - Intra-lesional triamcinolone procedure note: After positioning and cleansing with isopropyl alcohol, 2 total mL of triamcinolone 10 mg/mL was injected into 20 sites on the scalp. The patient tolerated the procedure well and left the dermatology clinic in good condition.    Follow-up: in 4 months    Staff and Resident:   Meka Bowling MD, Dermatology Resident, PGY-2    Patient was seen and examined with the dermatology resident. I agree with the history, review of systems, physical examination, assessments and plan. ILK injections were done  together.    Nita Brown MD  Professor and  Chair  Department of Dermatology  [facility]  ____________________________________________    CC: Hair Loss (Maria C, is here for hairloss.  )      HPI:  Ms. Maria C Goodman is a 65 year old female who presents today as a return patient for follow-up on alopecia areata. Feels that there has been much improvement since last time.  Current regimen includes:       - fluocinonide 0.05% solution (Lidex) PRN - helpful for the occasional itchiness   - clobetasol 0.05% shampoo 3-4x/wk  - fluocinolone 0.01% oil once weekly PRN  - minoxidil 5% foam 3-4 times per week  - Allegra 180 mg in the morning and cetirizine nightly   - clindamycin lotion PRN for lesions on occipital scalp. - says she hasn't needed this in a while    Denies burning of the scalp. Denies changes in eyebrows or eye lashes. Denies changes in nails.     Reports that since last visit she was diagnosed with psoriasis but her other general dermatologist. Reports it was just a small patch on the leg and it was controlled with clobetasol ointment.    Patient is otherwise feeling well, without additional concerns.    ROS: As per HPI    Labs:  CBC , CMP , Iron studies , TSH and Vitamin D reviewed.    Physical Exam:  Vitals: There were no vitals taken for this visit.  SKIN: Focused examination of the scalp and face was performed.  - scattered alopecic patches on frontal, occipital, and R/L temporal scalp - overall improved regrowth noted in these areas in comparison to prior photos  - very minimal patchy erythema on frontal and occipital scalp - improved compared to prior photos  - no scale evident   - eyebrows and eyelashes normal  - negative hair pull tests    Medications:  Current Outpatient Medications   Medication     ALBUTEROL IN     Calcium Carbonate-Vitamin D (CALCIUM + D PO)     cetirizine (ZYRTEC) 10 MG tablet     Cholecalciferol (VITAMIN D) 1000 UNITS capsule     clindamycin (CLEOCIN T) 1 % external lotion     clobetasol (TEMOVATE) 0.05 % external cream     clobetasol (TEMOVATE) 0.05 % ointment     clobetasol propionate (CLOBEX) 0.05 % external shampoo     escitalopram (LEXAPRO) 10 MG tablet      EXEMESTANE PO     famotidine (PEPCID) 10 MG tablet     ferrous gluconate (FERGON) 324 (38 FE) MG tablet     Fluocinolone Acetonide Scalp 0.01 % OIL oil     fluocinonide (LIDEX) 0.05 % external solution     ketoconazole (NIZORAL) 2 % shampoo     levothyroxine (SYNTHROID, LEVOTHROID) 112 MCG tablet     montelukast (SINGULAIR) 10 MG tablet     omalizumab (XOLAIR) 150 MG injection     tacrolimus (PROTOPIC) 0.1 % ointment     tretinoin (RETIN-A) 0.025 % external cream     triamcinolone acetonide (KENALOG) 10 MG/ML injection     Current Facility-Administered Medications   Medication     NONFORMULARY     triamcinolone acetonide (KENALOG-10) injection 10 mg     triamcinolone acetonide (KENALOG-10) injection 30 mg      Past Medical/Surgical History:   Patient Active Problem List   Diagnosis     Alopecia areata     Dermatitis     Hypertrichosis     Urticaria     Autoimmune disease (H)     Dermatitis, seborrheic     Past Medical History:   Diagnosis Date     Asthma      Hypothyroidism      Lobular breast cancer (H)     s/p chemotherapy and radiation, in remission     Multiple allergies        CC Referred Self, MD  No address on file on close of this encounter.

## 2021-01-14 NOTE — PROGRESS NOTES
Drug Administration Record    Prior to injection, verified patient identity using patient's name and date of birth.  Due to injection administration, patient instructed to remain in clinic for 15 minutes  afterwards, and to report any adverse reaction to me immediately.    Drug Name: triamcinolone acetonide(kenalog)  Dose: 2mL of triamcinolone 10mg/mL, 20mg dose  Route administered: ID  NDC #: Kenalog-10 ([id number])  Amount of waste(mL):3  Reason for waste: Multi dose vial    LOT #: [id number]  SITE: scalp  : [employer]  EXPIRATION DATE: 04/22

## 2021-02-17 DIAGNOSIS — L21.9 DERMATITIS, SEBORRHEIC: ICD-10-CM

## 2021-02-17 DIAGNOSIS — L63.9 ALOPECIA AREATA: ICD-10-CM

## 2021-02-17 DIAGNOSIS — L30.9 DERMATITIS: ICD-10-CM

## 2021-02-17 RX ORDER — CLOBETASOL PROPIONATE 0.05 G/100ML
SHAMPOO TOPICAL
Qty: 118 ML | Refills: 11 | Status: SHIPPED | OUTPATIENT
Start: 2021-02-17 | End: 2022-01-31

## 2021-02-17 NOTE — TELEPHONE ENCOUNTER
Pending Prescriptions:                       Disp   Refills    clobetasol propionate (CLOBEX) 0.05 % ext*118 mL 11           Sig: APPLY TOPICALLY TO A DRY SCALP, LEAVE ON FOR           10-15 MINUTES, THEN LATHER AND RINSE. DO THIS           EVERY OTHER DAY    Ms. Maria C Goodman is a 65 year old female who presents today as a return patient for follow-up on alopecia areata. Feels that there has been much improvement since last time.  Current regimen includes:       - fluocinonide 0.05% solution (Lidex) PRN - helpful for the occasional itchiness   - clobetasol 0.05% shampoo 3-4x/wk  - fluocinolone 0.01% oil once weekly PRN  - minoxidil 5% foam 3-4 times per week  - Allegra 180 mg in the morning and cetirizine nightly   - clindamycin lotion PRN for lesions on occipital scalp. - says she hasn't needed this in a while

## 2021-02-18 NOTE — TELEPHONE ENCOUNTER
Received refill request for clobetasol shampoo as the resident on call. Reviewed last dermatology clinic note. Plan was to continue this medication per last clinic note. Refill request was accepted.    Meka Bowling MD  Dermatology Resident, PGY-2

## 2021-02-20 DIAGNOSIS — L30.9 DERMATITIS: Primary | ICD-10-CM

## 2021-02-22 ENCOUNTER — TELEPHONE (OUTPATIENT)
Dept: ALLERGY | Facility: CLINIC | Age: 66
End: 2021-02-22

## 2021-02-22 NOTE — TELEPHONE ENCOUNTER
Health Call Center    Phone Message    May a detailed message be left on voicemail: yes     Reason for Call: Other: New patient that is scheduled with Dr Sanchez is concerned about stopping her antihistimines prior to the consult, and would like a nurse to call her to discuss this. Thanks!     Action Taken: Message routed to:  [facility] ([facility] Allergy    Travel Screening: Not Applicable

## 2021-02-23 NOTE — TELEPHONE ENCOUNTER
FUTURE VISIT INFORMATION      FUTURE VISIT INFORMATION:    Date: 5.24.21    Time: 12:00    Location: [facility]  REFERRAL INFORMATION:    Referring provider:  Dr. Brown    Referring providers clinic:  [facility]    Reason for visit/diagnosis  Dermatitis/ patch testing consult/ appt sched per pt/ referred by Dr Brown    RECORDS REQUESTED FROM:       Clinic name Comments Records Status Photos Status   [facility] 1.14.21, 1.12.21 + more with  Dr. Brown Epic Epic

## 2021-02-23 NOTE — TELEPHONE ENCOUNTER
Called to discuss questions and concerns with patient. Patient wanted to know if she needed to stop her Xolair. Informed patient that she did not need to stop that, and we would want her to stop her oral anti-histamines a minimum of 3 days before her in-person appointment. Patient was scheduled out into May 2021. Rescheduled patient to 2/24/21 for a virtual consult with  to discuss testing.    Malka Cortez RN

## 2021-02-24 ENCOUNTER — VIRTUAL VISIT (OUTPATIENT)
Dept: ALLERGY | Facility: CLINIC | Age: 66
End: 2021-02-24
Payer: COMMERCIAL

## 2021-02-24 DIAGNOSIS — L30.9 DERMATITIS: ICD-10-CM

## 2021-02-24 DIAGNOSIS — L23.89 ALLERGIC CONTACT DERMATITIS DUE TO OTHER AGENTS: ICD-10-CM

## 2021-02-24 DIAGNOSIS — L27.1 FIXED DRUG ERUPTION: Primary | ICD-10-CM

## 2021-02-24 DIAGNOSIS — L50.1 CHRONIC IDIOPATHIC URTICARIA: ICD-10-CM

## 2021-02-24 DIAGNOSIS — Z88.9 DRUG ALLERGY: ICD-10-CM

## 2021-02-24 PROCEDURE — 99203 OFFICE O/P NEW LOW 30 MIN: CPT | Mod: 95 | Performed by: DERMATOLOGY

## 2021-02-24 NOTE — PATIENT INSTRUCTIONS
We will contact Dr. Brown about alternative treatment for alopecia areata.  We will plan for patch testing and prick testing. Please hold all antihistamines for 4 days prior to testing.

## 2021-02-24 NOTE — PROGRESS NOTES
[facility] Dermato-Allergy Record     Allergy Problem List:    Specialty Problems        Allergy Diagnoses    Alopecia areata        Dermatitis        Hypertrichosis        Urticaria        Autoimmune disease (H)        Dermatitis, seborrheic              CC:   No chief complaint on file.  Rash and swelling face and arms      Encounter Date: Feb 24, 2021    History of Present Illness:  I have reviewed the teledermatology  information and the nursing intake corresponding to this issue. Maria C Goodman is a 65 year old female who presents as a teledermatology consult for the following information take directly from the nursing note (kept in this note for patient safety) and video call performed by myself: reports rash on the face and arms after ILK injections for alopecia areata. Rash presents a few days after ILK and lasts 1-3 days. Never had rash without in the absence of ILK, but also does not get the rash every time she gets ILK. She has a history of eczema and asthma well controlled with rare albuterol inhaler use. She has seasonal allergies in spring, has had scratch testing with many various positivities in the past. 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    Past Medical History:   Patient Active Problem List   Diagnosis     Alopecia areata     Dermatitis     Hypertrichosis     Urticaria     Autoimmune disease (H)     Dermatitis, seborrheic     Past Medical History:   Diagnosis Date     Asthma      Hypothyroidism      Lobular breast cancer (H)     s/p chemotherapy and radiation, in remission     Multiple allergies        Allergy History:     Allergies   Allergen Reactions     Acetaminophen Hives     Penicillins Hives     Shellfish Allergy GI Disturbance     Silver Nitrate Hives     tegaderm adhesive dressing      Sulfa Drugs Hives     Vicodin [Hydrocodone-Acetaminophen] Hives       Social  History:  The patient works. Patient has the following hobbies or non-occupational exposure n/a     reports that she has never smoked. She has never used smokeless tobacco.      Family History:  Family History   Problem Relation Age of Onset     Rheumatoid Arthritis Mother      Cancer No family hx of         skin cancer     Skin Cancer No family hx of         no skin cancer       Medications:  Current Outpatient Medications   Medication Sig Dispense Refill     ALBUTEROL IN Inhale into the lungs daily as needed       Calcium Carbonate-Vitamin D (CALCIUM + D PO) Take by mouth daily       cetirizine (ZYRTEC) 10 MG tablet Take 10 mg by mouth daily       Cholecalciferol (VITAMIN D) 1000 UNITS capsule Take 5,000 Units by mouth daily       clindamycin (CLEOCIN T) 1 % external lotion Apply topically 2 times daily Apply to back of scalp 60 mL 1     clobetasol (TEMOVATE) 0.05 % external cream Apply a fine layer to scalp in the morning after shampooing, a tube should last 2 months 60 g 1     clobetasol (TEMOVATE) 0.05 % ointment Apply a fine layer to the scalp at night after application of Rogaine, shampoo in am. A tube should last 2 months. 60 g 1     clobetasol propionate (CLOBEX) 0.05 % external shampoo APPLY TOPICALLY TO A DRY SCALP, LEAVE ON FOR 10-15 MINUTES, THEN LATHER AND RINSE. DO THIS EVERY OTHER  mL 11     escitalopram (LEXAPRO) 10 MG tablet Take 15 mg by mouth       EXEMESTANE PO Take by mouth daily       famotidine (PEPCID) 10 MG tablet TK 1 T PO QD  1     ferrous gluconate (FERGON) 324 (38 FE) MG tablet One tablet daily (Patient not taking: Reported on 9/15/2020) 60 tablet 1     Fluocinolone Acetonide Scalp 0.01 % OIL oil Apply topically once a week Use once a week. Apply 1-2 capfuls to dry scalp, massage in and leave on overnight, wash out in the morning 118.28 mL 5     fluocinonide (LIDEX) 0.05 % external solution Use to treat any areas on the scalp that are itchy as needed. Use twice daily to any areas  of complete hair loss. 60 mL 11     ketoconazole (NIZORAL) 2 % shampoo Apply to scalp, lather, then rinse, do every other day alternating with Head & Shoulders 120 mL 5     levothyroxine (SYNTHROID, LEVOTHROID) 112 MCG tablet Take by mouth daily       montelukast (SINGULAIR) 10 MG tablet Take 10 mg by mouth At Bedtime       omalizumab (XOLAIR) 150 MG injection Inject 300 mg Subcutaneous       tacrolimus (PROTOPIC) 0.1 % ointment Apply topically 2 times daily 60 g 1     tretinoin (RETIN-A) 0.025 % external cream Apply a pea size amount to face daily as tolerated - ok for night time application 45 g 1     triamcinolone acetonide (KENALOG) 10 MG/ML injection See med note 5 mL 0         Additional comments and observations from review of the patient s chart including the following:    See notes    ROS: Patient generally feeling well today   Physical Examination:  General: Well-appearing, appropriately-developed individual.  - Skin: Examination of the face and neck within the teledermatology was performed. Photos of arms and face 1/14/21. Photos show mild erythema and edema of cheeks and arms without scale. No lesions seen on video.  - upper respiratory tract: currently no obvious Rhinitis  - lungs: no signs for active and present Asthma/Wheezing or coughing  - eyes: currently no active conjunctivits  - GI system/digestion: currently no problems, no bloating or Diarrhoea    Allergy tests:    Past Allergy tests scratch testing: dog, horse, weeds    Current Allergy tests (or planned)      Order for PATCH TESTS    [x] Outpatient  [] Inpatient: [facility]..../ Bed ....      Skin Atopy (atopic dermatitis) [x] Yes   [] No  Rhinitis/Sinusitis:   [x] Yes   [] No  Allergic Asthma:   [x] Yes   [] No  Food Allergy:   [] Yes   [x] No  Leg ulcers:   [] Yes   [x] No  Hand eczema:   [] Yes   [x] No   Leading hand:   [] R   [] L       [] Ambidextrous                        Reason for tests (suspected allergy):   Known previous allergies:      Standardized panels  [x] Standard panel (40 tests)  [x] Preservatives & Antimicrobials (31 tests)  [x] Emulsifiers & Additives (25 tests)   [] Perfumes/Flavours & Plants (25 tests)  [] Hairdresser panel (12 tests)  [] Rubber Chemicals (22 tests)  [x] Plastics (26 tests)  [] Colorants/Dyes/Food additives (20 tests)  [] Metals (implants/dental) (24 tests)  [] Local anaesthetics/NSAIDs (14 tests)  [x] Antibiotics & Antimycotics (14 tests)   [x] Corticosteroids (15 tests)   [] Photopatch test (62 tests)   [] others: ...      [] Patient's own products: ...    DO NOT test if chemical or biological identity is unknown!     always ask from patient the product information and safety sheets (MSDS)   [] Atopy screen prick test (Atopic predisposition): ...    [] Patient needs consultation with Allergy team (changes of tests may apply)  [] Tests discussed with Allergy team (can have direct appointment for patch tests)     Order for PRICK TESTS    [x] Outpatient  [] Inpatient: [facility]..../ Bed ....      Skin Atopy (atopic dermatitis) [x] Yes   [] No  Contact allergies:   [x] Yes   [] No  Urticaria/Angioedema  [x] Yes   [] No  Rhinitis/Sinusitis:   [x] Yes   [] No   [x] seasonal [] perennial            Allergic Asthma:   [x] Yes   [] No  Pets :  [] Yes   [x] No  which?......  Food Allergy:   [] Yes   [x] No  Drug allergies:   [x] Yes   [] No      Reason for tests (suspected allergy):   Known previous allergies:     Standardized prick panels  [] Atopic panel (20 tests)  [] Pediatric Panel (12 tests)  [] Milk, Meat, Eggs, Grains (20 tests)   [] Dust, Epithelia, Feathers (10 tests)  [] Fish, Seafood, Shellfish (17 tests)  [] Nuts, Beans (14 tests)  [] Spice, Vegetable, Fruit (17 tests)  [] Others: ...      [] Patient's own products: ...    DO NOT test if chemical or biological identity is unknown!     always ask from patient the product information and safety sheets (MSDS)     Standardized intradermal tests  [] Penicillium  notatum [] Aspergillus fumigatus [] House dust mites  [] Bee venom   [] Wasp venom !!Specific protocol with dilutions!!  [] Others: ...    [] Patient needs consultation with Allergy team (changes of tests may apply)  [] Tests discussed with Allergy team (can have direct appointment for allergy tests)     Impression/Plan:    1) Non-eczematous reaction on face and arms after ILK injections: ddx fixed drug eruption to triamcinolone vs urticaria    Consider intradermal test on site of reaction, patient not interested in biopsy if positive reaction = test [facility]     Will send message to Dr. Brown for alternative treatment = see below, use betamethasone for AA    2) Chronic urticaria on omalizumab    Plan for atopy screen    3) History of penicillin reaction (hives) vs. Chronic urticaria without reaction to penicillin    Consider drug allergy testing, good time since urticaria suppressed by Xolair    Specifically test for penicillins and sulfonamides    4) History of reaction to adhesives or disinfectants    Plan for patch testing    Patient will stop into clinic before that to have small test area with patch to see if she reacts      Patient counseling:  Counseled that we will contact Dr. Brown about alternative treatments for AA = try betamethasone injections  No antihistamines 4 days before testing.      Literature search by Dr. Flores 02/25/2021:    I reviewed the literature and it appears that intralesional betamethasone is a safe alternative. One study compared various concentrations of both triamcinolone and betamethasone dipropionate and concluded that betamethasone dipropionate at 1.25 mg/dL was favored since it had similar results as triamcinolone but less cutaneous atrophy (PMID 02780087). Another study found that triamcinolone works faster but betamethasone 1.75 mg/dL had equal results at 12 weeks (PMID 43625299). An additional editorial argues for betamethasone as well, but this one goes  further as to state that betamethasone disodium phosphate is even better than betamethasone dipropionate, with their reasoning being that betamethasone disodium phosphate is less soluble and absorbed more slowly, allowing for a prolonged local effect in the skin (PMID 22915852). It seems like there are a lot of claims supporting intralesional betamethasone. I did not find anything in my research about intralesional dexamethasone.         Follow-up: in Derm-Allergy clinic for patch testing.    Referred by: Dr. Brown    Thank you for the opportunity be involved in the care of this patient.     Staff:  Joleen Rivera and Malka Lopez, ÁNGEL Flores MD  Dermatology Resident    Staff Physician Comments:  I saw and evaluated the patient with the resident and I agree with the assessment and plan as documented in the resident's note.    Torres Sanchez MD  Professor  Head of Dermato-Allergy Division  Department of Dermatology  [facility]    _____________________________________________________________________________    Teledermatology information:  - Location of patient: Home  - Patient presented in referral from: self other  - Location of teledermatologist:  [facility] [place] (, [place], [place])  - Reason teledermatology is appropriate:  of National Emergency Regarding Coronavirus disease (COVID 19) Outbreak  - Method of transmission:  easy[employer] Connected  - Date of images: 1/14/21  - Service start time:8:15 am  - Service end time:8:45 am  - Date of report: 02/23/21    I spent a total of 30 minutes for telemedicine consult with Maria C Goodman during today s video meeting. Over 50% of this time was spent counseling the patient and/or coordinating care. Please see Assessment and Plan for details.

## 2021-02-24 NOTE — LETTER
2/24/2021         RE: Maria C Goodman  [address] [place] [address]        Dear Colleague,    Thank you for referring your patient, Maria C Goodman, to the [facility] [place]. Please see a copy of my visit note below.    [facility] Dermato-Allergy Record     Allergy Problem List:    Specialty Problems        Allergy Diagnoses    Alopecia areata        Dermatitis        Hypertrichosis        Urticaria        Autoimmune disease (H)        Dermatitis, seborrheic              CC:   No chief complaint on file.  Rash and swelling face and arms      Encounter Date: Feb 24, 2021    History of Present Illness:  I have reviewed the teledermatology  information and the nursing intake corresponding to this issue. Maria C Goodman is a 65 year old female who presents as a teledermatology consult for the following information take directly from the nursing note (kept in this note for patient safety) and video call performed by myself: reports rash on the face and arms after ILK injections for alopecia areata. Rash presents a few days after ILK and lasts 1-3 days. Never had rash without in the absence of ILK, but also does not get the rash every time she gets ILK. She has a history of eczema and asthma well controlled with rare albuterol inhaler use. She has seasonal allergies in spring, has had scratch testing with many various positivities in the past. 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    Past Medical History:   Patient Active Problem List   Diagnosis     Alopecia areata     Dermatitis     Hypertrichosis     Urticaria     Autoimmune disease (H)     Dermatitis, seborrheic     Past Medical History:   Diagnosis Date     Asthma      Hypothyroidism      Lobular breast cancer (H)     s/p chemotherapy and radiation, in remission     Multiple allergies        Allergy  History:     Allergies   Allergen Reactions     Acetaminophen Hives     Penicillins Hives     Shellfish Allergy GI Disturbance     Silver Nitrate Hives     tegaderm adhesive dressing      Sulfa Drugs Hives     Vicodin [Hydrocodone-Acetaminophen] Hives       Social History:  The patient works. Patient has the following hobbies or non-occupational exposure n/a     reports that she has never smoked. She has never used smokeless tobacco.      Family History:  Family History   Problem Relation Age of Onset     Rheumatoid Arthritis Mother      Cancer No family hx of         skin cancer     Skin Cancer No family hx of         no skin cancer       Medications:  Current Outpatient Medications   Medication Sig Dispense Refill     ALBUTEROL IN Inhale into the lungs daily as needed       Calcium Carbonate-Vitamin D (CALCIUM + D PO) Take by mouth daily       cetirizine (ZYRTEC) 10 MG tablet Take 10 mg by mouth daily       Cholecalciferol (VITAMIN D) 1000 UNITS capsule Take 5,000 Units by mouth daily       clindamycin (CLEOCIN T) 1 % external lotion Apply topically 2 times daily Apply to back of scalp 60 mL 1     clobetasol (TEMOVATE) 0.05 % external cream Apply a fine layer to scalp in the morning after shampooing, a tube should last 2 months 60 g 1     clobetasol (TEMOVATE) 0.05 % ointment Apply a fine layer to the scalp at night after application of Rogaine, shampoo in am. A tube should last 2 months. 60 g 1     clobetasol propionate (CLOBEX) 0.05 % external shampoo APPLY TOPICALLY TO A DRY SCALP, LEAVE ON FOR 10-15 MINUTES, THEN LATHER AND RINSE. DO THIS EVERY OTHER  mL 11     escitalopram (LEXAPRO) 10 MG tablet Take 15 mg by mouth       EXEMESTANE PO Take by mouth daily       famotidine (PEPCID) 10 MG tablet TK 1 T PO QD  1     ferrous gluconate (FERGON) 324 (38 FE) MG tablet One tablet daily (Patient not taking: Reported on 9/15/2020) 60 tablet 1     Fluocinolone Acetonide Scalp 0.01 % OIL oil Apply topically once a  week Use once a week. Apply 1-2 capfuls to dry scalp, massage in and leave on overnight, wash out in the morning 118.28 mL 5     fluocinonide (LIDEX) 0.05 % external solution Use to treat any areas on the scalp that are itchy as needed. Use twice daily to any areas of complete hair loss. 60 mL 11     ketoconazole (NIZORAL) 2 % shampoo Apply to scalp, lather, then rinse, do every other day alternating with Head & Shoulders 120 mL 5     levothyroxine (SYNTHROID, LEVOTHROID) 112 MCG tablet Take by mouth daily       montelukast (SINGULAIR) 10 MG tablet Take 10 mg by mouth At Bedtime       omalizumab (XOLAIR) 150 MG injection Inject 300 mg Subcutaneous       tacrolimus (PROTOPIC) 0.1 % ointment Apply topically 2 times daily 60 g 1     tretinoin (RETIN-A) 0.025 % external cream Apply a pea size amount to face daily as tolerated - ok for night time application 45 g 1     triamcinolone acetonide (KENALOG) 10 MG/ML injection See med note 5 mL 0         Additional comments and observations from review of the patient s chart including the following:    See notes    ROS: Patient generally feeling well today   Physical Examination:  General: Well-appearing, appropriately-developed individual.  - Skin: Examination of the face and neck within the teledermatology was performed. Photos of arms and face 1/14/21. Photos show mild erythema and edema of cheeks and arms without scale. No lesions seen on video.  - upper respiratory tract: currently no obvious Rhinitis  - lungs: no signs for active and present Asthma/Wheezing or coughing  - eyes: currently no active conjunctivits  - GI system/digestion: currently no problems, no bloating or Diarrhoea    Allergy tests:    Past Allergy tests scratch testing: dog, horse, weeds    Current Allergy tests (or planned)      Order for PATCH TESTS    [x] Outpatient  [] Inpatient: [facility]..../ Bed ....      Skin Atopy (atopic dermatitis) [x] Yes   [] No  Rhinitis/Sinusitis:   [x] Yes   [] No  Allergic  Asthma:   [x] Yes   [] No  Food Allergy:   [] Yes   [x] No  Leg ulcers:   [] Yes   [x] No  Hand eczema:   [] Yes   [x] No   Leading hand:   [] R   [] L       [] Ambidextrous                        Reason for tests (suspected allergy):   Known previous allergies:     Standardized panels  [x] Standard panel (40 tests)  [x] Preservatives & Antimicrobials (31 tests)  [x] Emulsifiers & Additives (25 tests)   [] Perfumes/Flavours & Plants (25 tests)  [] Hairdresser panel (12 tests)  [] Rubber Chemicals (22 tests)  [x] Plastics (26 tests)  [] Colorants/Dyes/Food additives (20 tests)  [] Metals (implants/dental) (24 tests)  [] Local anaesthetics/NSAIDs (14 tests)  [x] Antibiotics & Antimycotics (14 tests)   [x] Corticosteroids (15 tests)   [] Photopatch test (62 tests)   [] others: ...      [] Patient's own products: ...    DO NOT test if chemical or biological identity is unknown!     always ask from patient the product information and safety sheets (MSDS)   [] Atopy screen prick test (Atopic predisposition): ...    [] Patient needs consultation with Allergy team (changes of tests may apply)  [] Tests discussed with Allergy team (can have direct appointment for patch tests)     Order for PRICK TESTS    [x] Outpatient  [] Inpatient: [facility]..../ Bed ....      Skin Atopy (atopic dermatitis) [x] Yes   [] No  Contact allergies:   [x] Yes   [] No  Urticaria/Angioedema  [x] Yes   [] No  Rhinitis/Sinusitis:   [x] Yes   [] No   [x] seasonal [] perennial            Allergic Asthma:   [x] Yes   [] No  Pets :  [] Yes   [x] No  which?......  Food Allergy:   [] Yes   [x] No  Drug allergies:   [x] Yes   [] No      Reason for tests (suspected allergy):   Known previous allergies:     Standardized prick panels  [] Atopic panel (20 tests)  [] Pediatric Panel (12 tests)  [] Milk, Meat, Eggs, Grains (20 tests)   [] Dust, Epithelia, Feathers (10 tests)  [] Fish, Seafood, Shellfish (17 tests)  [] Nuts, Beans (14 tests)  [] Spice, Vegetable, Fruit  (17 tests)  [] Others: ...      [] Patient's own products: ...    DO NOT test if chemical or biological identity is unknown!     always ask from patient the product information and safety sheets (MSDS)     Standardized intradermal tests  [] Penicillium notatum [] Aspergillus fumigatus [] House dust mites  [] Bee venom   [] Wasp venom !!Specific protocol with dilutions!!  [] Others: ...    [] Patient needs consultation with Allergy team (changes of tests may apply)  [] Tests discussed with Allergy team (can have direct appointment for allergy tests)     Impression/Plan:    1) Non-eczematous reaction on face and arms after ILK injections: ddx fixed drug eruption to triamcinolone vs urticaria    Consider intradermal test on site of reaction, patient not interested in biopsy if positive reaction    Will send message to Dr. Brown for alternative treatment (betamethasone/dexamethason) for AA    2) Chronic urticaria on omalizumab    Plan for atopy screen    3) History of penicillin reaction (hives) vs. Chronic urticaria without reaction to penicillin    Consider drug allergy testing, good time since urticaria suppressed by Xolair    Specifically test for penicillins and sulfonamides    4) History of reaction to adhesives or disinfectants    Plan for patch testing    Patient will stop into clinic before that to have small test area with patch to see if she reacts      Patient counseling:  Counseled that we will contact Dr. Brown about alternative treatments for AA  No antihistamines 4 days before testing.    Follow-up: in Derm-Allergy clinic for patch testing.    Referred by: Dr. Brown    Thank you for the opportunity be involved in the care of this patient.     Staff:  Joleen Rivera and ÁNGEL Mckeon MD  Dermatology Resident    Staff Physician Comments:  I saw and evaluated the patient with the resident and I agree with the assessment and plan as documented in the resident's  note.    Torres Sanchez MD  Professor  Head of Dermato-Allergy Division  Department of Dermatology  [facility]    _____________________________________________________________________________    Teledermatology information:  - Location of patient: Home  - Patient presented in referral from: self other  - Location of teledermatologist:  [facility] [place] (, [facility], [place])  - Reason teledermatology is appropriate:  of National Emergency Regarding Coronavirus disease (COVID 19) Outbreak  - Method of transmission:  [employer] Connected  - Date of images: 1/14/21  - Service start time:8:15 am  - Service end time:8:45 am  - Date of report: 02/23/21    I spent a total of 30 minutes for telemedicine consult with Maria C Goodman during today s video meeting. Over 50% of this time was spent counseling the patient and/or coordinating care. Please see Assessment and Plan for details.       Again, thank you for allowing me to participate in the care of your patient.        Sincerely,        Torres Sanchez MD

## 2021-02-25 RX ORDER — BETAMETHASONE SODIUM PHOSPHATE AND BETAMETHASONE ACETATE 3; 3 MG/ML; MG/ML
6 INJECTION, SUSPENSION INTRA-ARTICULAR; INTRALESIONAL; INTRAMUSCULAR; SOFT TISSUE ONCE
Status: ACTIVE | OUTPATIENT
Start: 2021-03-03

## 2021-04-12 ENCOUNTER — OFFICE VISIT (OUTPATIENT)
Dept: DERMATOLOGY | Facility: CLINIC | Age: 66
End: 2021-04-12
Payer: COMMERCIAL

## 2021-04-12 DIAGNOSIS — L30.9 DERMATITIS: ICD-10-CM

## 2021-04-12 DIAGNOSIS — L63.9 ALOPECIA AREATA: Primary | ICD-10-CM

## 2021-04-12 DIAGNOSIS — L50.9 URTICARIA: ICD-10-CM

## 2021-04-12 PROCEDURE — 11901 INJECT SKIN LESIONS >7: CPT | Performed by: DERMATOLOGY

## 2021-04-12 ASSESSMENT — PAIN SCALES - GENERAL: PAINLEVEL: NO PAIN (0)

## 2021-04-12 NOTE — NURSING NOTE
Drug Administration Record    Prior to injection, verified patient identity using patient's name and date of birth.  Due to injection administration, patient instructed to remain in clinic for 15 minutes  afterwards, and to report any adverse reaction to me immediately.    Drug Name: triamcinolone acetonide(kenalog)  Dose: 3mL of triamcinolone 10mg/mL, 30mg dose  Route administered: ID  NDC #: Kenalog-10 (3391-5353-92)  Amount of waste(mL):2  Reason for waste: Multi dose vial    LOT #: [id number]  SITE: SCalp  : [employer]  EXPIRATION DATE: 06/2022

## 2021-04-12 NOTE — LETTER
4/12/2021       RE: Maria C Goodman  [address] [place] [address]     Dear Colleague,    Thank you for referring your patient, Maria C Goodman, to the [facility] DERMATOLOGY CLINIC [place] at [facility]. Please see a copy of my visit note below.    [facility] Dermatology Note    Dermatology Problem List:  1. 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 Psoriasis, managed by outside derm, well controlled on clobetasol oint prn    Encounter Date: Apr 12, 2021    CC:   Chief Complaint   Patient presents with     Hair Loss     Alopecia areata - Maria C states there has been improvement        History of Present Illness:  Ms. Maria C Goodman is a 65 year old female who presents as a follow-up patient for hair loss.     - Last seen in clinic on 1/12/21  - Current tx: Lidex solution occasionally, Clobex shampoo 3-4 times per week, H&S shampoo on other days, Derma-Smoothe FS oil once per week, minoxidil foam  - Allegra 180 mg morning, cetirizine at night  - Clindamycin lotn PRN  - s/p ILK most recently on 4/12/21, 1/12/21  - Scalp pain: No  - Scalp burning: No  - Scalp itching: Occasional pruritus, though improved over previously  - Eyebrow or eyelash changes: No  - Nail changes: No  - Overall course: She states that she feels there has been significant improvement in her hair loss, with regrowth since her last visit. She says that she now feels that her hair is thick for the first time in a while. She is not sure if there are any major changes prior to this improvement, though she states that she feels she might have had sufficient time on her current regimen for it to have its intended effect. She is again interested in ILK injections to the  affected areas. She notes facial redness with prior ILK injections, but says she had no symptoms after her last ILK injections 1/12/21.    She states that she is being seen by another dermatologist for small areas of psoriasis, treated with clobetasol ointment prn, and she feels that this is well-controlled. She also mentions previous cold- and pressure-induced urticaria that are well-controlled by allegra, cetirizine, and omalizumab (she follows with Dr. Wilkerson for allergy).    No other concerns today and in general state of health.     Past Medical History:   Patient Active Problem List   Diagnosis     Alopecia areata     Dermatitis     Hypertrichosis     Urticaria     Autoimmune disease (H)     Dermatitis, seborrheic     Past Medical History:   Diagnosis Date     Asthma      Hypothyroidism      Lobular breast cancer (H)     s/p chemotherapy and radiation, in remission     Multiple allergies      No past surgical history on file.    Social History:   reports that she has never smoked. She has never used smokeless tobacco.    Family History:  Family History   Problem Relation Age of Onset     Rheumatoid Arthritis Mother      Cancer No family hx of         skin cancer     Skin Cancer No family hx of         no skin cancer       Medications:  Current Outpatient Medications   Medication Sig Dispense Refill     ALBUTEROL IN Inhale into the lungs daily as needed       Calcium Carbonate-Vitamin D (CALCIUM + D PO) Take by mouth daily       cetirizine (ZYRTEC) 10 MG tablet Take 10 mg by mouth daily       Cholecalciferol (VITAMIN D) 1000 UNITS capsule Take 5,000 Units by mouth daily       clindamycin (CLEOCIN T) 1 % external lotion Apply topically 2 times daily Apply to back of scalp 60 mL 1     clobetasol (TEMOVATE) 0.05 % external cream Apply a fine layer to scalp in the morning after shampooing, a tube should last 2 months 60 g 1     clobetasol (TEMOVATE) 0.05 % ointment Apply a fine layer to the scalp at night after  application of Rogaine, shampoo in am. A tube should last 2 months. 60 g 1     clobetasol propionate (CLOBEX) 0.05 % external shampoo APPLY TOPICALLY TO A DRY SCALP, LEAVE ON FOR 10-15 MINUTES, THEN LATHER AND RINSE. DO THIS EVERY OTHER  mL 11     escitalopram (LEXAPRO) 10 MG tablet Take 15 mg by mouth       EXEMESTANE PO Take by mouth daily       famotidine (PEPCID) 10 MG tablet TK 1 T PO QD  1     Fluocinolone Acetonide Scalp 0.01 % OIL oil Apply topically once a week Use once a week. Apply 1-2 capfuls to dry scalp, massage in and leave on overnight, wash out in the morning 118.28 mL 5     fluocinonide (LIDEX) 0.05 % external solution Use to treat any areas on the scalp that are itchy as needed. Use twice daily to any areas of complete hair loss. 60 mL 11     ketoconazole (NIZORAL) 2 % shampoo Apply to scalp, lather, then rinse, do every other day alternating with Head & Shoulders 120 mL 5     levothyroxine (SYNTHROID, LEVOTHROID) 112 MCG tablet Take by mouth daily       montelukast (SINGULAIR) 10 MG tablet Take 10 mg by mouth At Bedtime       tacrolimus (PROTOPIC) 0.1 % ointment Apply topically 2 times daily 60 g 1     tretinoin (RETIN-A) 0.025 % external cream Apply a pea size amount to face daily as tolerated - ok for night time application 45 g 1     triamcinolone acetonide (KENALOG) 10 MG/ML injection See med note 5 mL 0     ferrous gluconate (FERGON) 324 (38 FE) MG tablet One tablet daily (Patient not taking: Reported on 9/15/2020) 60 tablet 1     omalizumab (XOLAIR) 150 MG injection Inject 300 mg Subcutaneous        Allergies   Allergen Reactions     Acetaminophen Hives     Penicillins Hives     Shellfish Allergy GI Disturbance     Silver Nitrate Hives     tegaderm adhesive dressing      Sulfa Drugs Hives     Vicodin [Hydrocodone-Acetaminophen] Hives       Review of Systems:  -Constitutional: Otherwise doing well.   -HEENT: Patient denies nonhealing oral sores.  -Skin: As above in HPI. No additional  skin concerns.    Physical exam:  Vitals: There were no vitals taken for this visit.  GEN: Well developed, well-nourished, in no acute distress, in a pleasant mood.    SKIN: Focused examination of the hair, scalp, and nails was performed.  - Scattered alopecic patches on frontal, occipital, and R/L temporal scalp - again improved regrowth noted in these areas in comparison to prior photos  - Some perifollicular erythema at posterior hairline  - No scale evident   - Eyebrows and eyelashes normal density  - Negative hair pull tests  - Nails no pitting or dystrophy                                Impression/Plan:  1. Alopecia Areata- Again improved over previously  - Continue current regimen:              - Fluocinonide 0.05% solution (Lidex) PRN              - Clobetasol 0.05% shampoo 3-4x/wk, can continue Head and Shoulders on off days              - Fluocinolone 0.01% oil once weekly PRN              - Minoxidil 5% foam up to daily (patient doing 3-4 times per week)              - Allegra 180 mg in the morning and cetirizine nightly               - Clindamycin lotion PRN for lesions on occipital scalp.    - 2.4 mL ILK 10 today  - After verbal consent and discussion of risks including but not limited to atrophy, pain, and bruising, cleansing with isopropyl alcohol, time out was performed,  2.4  total cc of Kenalog 10 mg/cc was injected into 24 sites on the mid-scalp, temporal scalp superior to the ears bilaterally, and in the occipital region The patient tolerated the procedure well and left the Dermatology Clinic in good condition.    Follow-up June 22nd, earlier for new or changing lesions.     Dr. Brown staffed the patient.    Staff Involved:  Scribe/Fellow (Ever Lira)/Staff(as above)    Staff Physician:  I was present with the medical student who participated in the service and in the documentation of the note. I have verified the history and personally performed the physical exam and medical decision  making. I agree with the assessment and plan of care as documented in the note.  Hair metrix pictures reviewed and incorporated into the decision making progress for the plan.      Nita Brown MD  Professor and Chair  Department of Dermatology  [facility]: Phone: [phone], Fax:[phone]  [facility]: Phone: [phone], Fax: [phone]            [employer] Photography 4/12/2021:    Frontal anterior scalp     Mid scalp    Vertex scalp     Occipital scalp     Right temporal scalp     Left temporal scalp       Again, thank you for allowing me to participate in the care of your patient.      Sincerely,    Nita Brown MD

## 2021-04-12 NOTE — PROGRESS NOTES
[facility] Dermatology Note    Dermatology Problem List:  1. 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 Psoriasis, managed by outside derm, well controlled on clobetasol oint prn    Encounter Date: Apr 12, 2021    CC:   Chief Complaint   Patient presents with     Hair Loss     Alopecia areata - Maria C states there has been improvement        History of Present Illness:  Ms. Maria C Goodman is a 65 year old female who presents as a follow-up patient for hair loss.     - Last seen in clinic on 1/12/21  - Current tx: Lidex solution occasionally, Clobex shampoo 3-4 times per week, H&S shampoo on other days, Derma-Smoothe FS oil once per week, minoxidil foam  - Allegra 180 mg morning, cetirizine at night  - Clindamycin lotn PRN  - s/p ILK most recently on 4/12/21, 1/12/21  - Scalp pain: No  - Scalp burning: No  - Scalp itching: Occasional pruritus, though improved over previously  - Eyebrow or eyelash changes: No  - Nail changes: No  - Overall course: She states that she feels there has been significant improvement in her hair loss, with regrowth since her last visit. She says that she now feels that her hair is thick for the first time in a while. She is not sure if there are any major changes prior to this improvement, though she states that she feels she might have had sufficient time on her current regimen for it to have its intended effect. She is again interested in ILK injections to the affected areas. She notes facial redness with prior ILK injections, but says she had no symptoms after her last ILK injections 1/12/21.    She states that she is being seen by another dermatologist for small areas of psoriasis, treated with clobetasol ointment prn, and she feels that this is well-controlled. She  also mentions previous cold- and pressure-induced urticaria that are well-controlled by allegra, cetirizine, and omalizumab (she follows with Dr. Wilkerson for allergy).    No other concerns today and in general state of health.     Past Medical History:   Patient Active Problem List   Diagnosis     Alopecia areata     Dermatitis     Hypertrichosis     Urticaria     Autoimmune disease (H)     Dermatitis, seborrheic     Past Medical History:   Diagnosis Date     Asthma      Hypothyroidism      Lobular breast cancer (H)     s/p chemotherapy and radiation, in remission     Multiple allergies      No past surgical history on file.    Social History:   reports that she has never smoked. She has never used smokeless tobacco.    Family History:  Family History   Problem Relation Age of Onset     Rheumatoid Arthritis Mother      Cancer No family hx of         skin cancer     Skin Cancer No family hx of         no skin cancer       Medications:  Current Outpatient Medications   Medication Sig Dispense Refill     ALBUTEROL IN Inhale into the lungs daily as needed       Calcium Carbonate-Vitamin D (CALCIUM + D PO) Take by mouth daily       cetirizine (ZYRTEC) 10 MG tablet Take 10 mg by mouth daily       Cholecalciferol (VITAMIN D) 1000 UNITS capsule Take 5,000 Units by mouth daily       clindamycin (CLEOCIN T) 1 % external lotion Apply topically 2 times daily Apply to back of scalp 60 mL 1     clobetasol (TEMOVATE) 0.05 % external cream Apply a fine layer to scalp in the morning after shampooing, a tube should last 2 months 60 g 1     clobetasol (TEMOVATE) 0.05 % ointment Apply a fine layer to the scalp at night after application of Rogaine, shampoo in am. A tube should last 2 months. 60 g 1     clobetasol propionate (CLOBEX) 0.05 % external shampoo APPLY TOPICALLY TO A DRY SCALP, LEAVE ON FOR 10-15 MINUTES, THEN LATHER AND RINSE. DO THIS EVERY OTHER  mL 11     escitalopram (LEXAPRO) 10 MG tablet Take 15 mg by mouth        EXEMESTANE PO Take by mouth daily       famotidine (PEPCID) 10 MG tablet TK 1 T PO QD  1     Fluocinolone Acetonide Scalp 0.01 % OIL oil Apply topically once a week Use once a week. Apply 1-2 capfuls to dry scalp, massage in and leave on overnight, wash out in the morning 118.28 mL 5     fluocinonide (LIDEX) 0.05 % external solution Use to treat any areas on the scalp that are itchy as needed. Use twice daily to any areas of complete hair loss. 60 mL 11     ketoconazole (NIZORAL) 2 % shampoo Apply to scalp, lather, then rinse, do every other day alternating with Head & Shoulders 120 mL 5     levothyroxine (SYNTHROID, LEVOTHROID) 112 MCG tablet Take by mouth daily       montelukast (SINGULAIR) 10 MG tablet Take 10 mg by mouth At Bedtime       tacrolimus (PROTOPIC) 0.1 % ointment Apply topically 2 times daily 60 g 1     tretinoin (RETIN-A) 0.025 % external cream Apply a pea size amount to face daily as tolerated - ok for night time application 45 g 1     triamcinolone acetonide (KENALOG) 10 MG/ML injection See med note 5 mL 0     ferrous gluconate (FERGON) 324 (38 FE) MG tablet One tablet daily (Patient not taking: Reported on 9/15/2020) 60 tablet 1     omalizumab (XOLAIR) 150 MG injection Inject 300 mg Subcutaneous        Allergies   Allergen Reactions     Acetaminophen Hives     Penicillins Hives     Shellfish Allergy GI Disturbance     Silver Nitrate Hives     tegaderm adhesive dressing      Sulfa Drugs Hives     Vicodin [Hydrocodone-Acetaminophen] Hives       Review of Systems:  -Constitutional: Otherwise doing well.   -HEENT: Patient denies nonhealing oral sores.  -Skin: As above in HPI. No additional skin concerns.    Physical exam:  Vitals: There were no vitals taken for this visit.  GEN: Well developed, well-nourished, in no acute distress, in a pleasant mood.    SKIN: Focused examination of the hair, scalp, and nails was performed.  - Scattered alopecic patches on frontal, occipital, and R/L temporal  scalp - again improved regrowth noted in these areas in comparison to prior photos  - Some perifollicular erythema at posterior hairline  - No scale evident   - Eyebrows and eyelashes normal density  - Negative hair pull tests  - Nails no pitting or dystrophy                                Impression/Plan:  1. Alopecia Areata- Again improved over previously  - Continue current regimen:              - Fluocinonide 0.05% solution (Lidex) PRN              - Clobetasol 0.05% shampoo 3-4x/wk, can continue Head and Shoulders on off days              - Fluocinolone 0.01% oil once weekly PRN              - Minoxidil 5% foam up to daily (patient doing 3-4 times per week)              - Allegra 180 mg in the morning and cetirizine nightly               - Clindamycin lotion PRN for lesions on occipital scalp.    - 2.4 mL ILK 10 today  - After verbal consent and discussion of risks including but not limited to atrophy, pain, and bruising, cleansing with isopropyl alcohol, time out was performed,  2.4  total cc of Kenalog 10 mg/cc was injected into 24 sites on the mid-scalp, temporal scalp superior to the ears bilaterally, and in the occipital region The patient tolerated the procedure well and left the Dermatology Clinic in good condition.    Follow-up June 22nd, earlier for new or changing lesions.     Dr. Brown staffed the patient.    Staff Involved:  Scribe/Fellow (Ever Lira)/Staff(as above)    Staff Physician:  I was present with the medical student who participated in the service and in the documentation of the note. I have verified the history and personally performed the physical exam and medical decision making. I agree with the assessment and plan of care as documented in the note.  Hair metrix pictures reviewed and incorporated into the decision making progress for the plan.      Nita Brown MD  Professor and Chair  Department of Dermatology  [facility]  Clinics: Phone: [phone], Fax:[phone]  [facility]: Phone: [phone], Fax: [phone]

## 2021-04-12 NOTE — NURSING NOTE
Dermatology Rooming Note    Maria C Goodman's goals for this visit include:   Chief Complaint   Patient presents with     Hair Loss     Alopecia areata - Maria C states there has been improvement      Anna Peters, CMA

## 2021-05-05 NOTE — PROGRESS NOTES
[employer] Photography 4/12/2021:    Frontal anterior scalp     Mid scalp    Vertex scalp     Occipital scalp     Right temporal scalp     Left temporal scalp

## 2021-05-24 ENCOUNTER — PRE VISIT (OUTPATIENT)
Dept: ALLERGY | Facility: CLINIC | Age: 66
End: 2021-05-24

## 2021-06-22 ENCOUNTER — OFFICE VISIT (OUTPATIENT)
Dept: DERMATOLOGY | Facility: CLINIC | Age: 66
End: 2021-06-22
Payer: COMMERCIAL

## 2021-06-22 DIAGNOSIS — L63.9 ALOPECIA AREATA: Primary | ICD-10-CM

## 2021-06-22 DIAGNOSIS — M35.9 AUTOIMMUNE DISEASE (H): ICD-10-CM

## 2021-06-22 PROCEDURE — 99213 OFFICE O/P EST LOW 20 MIN: CPT | Mod: GC | Performed by: DERMATOLOGY

## 2021-06-22 ASSESSMENT — PAIN SCALES - GENERAL: PAINLEVEL: NO PAIN (0)

## 2021-06-22 NOTE — LETTER
6/22/2021       RE: Maria C Goodman  [address] [place] [address]     Dear Colleague,    Thank you for referring your patient, Maria C Goodman, to the [facility] DERMATOLOGY CLINIC [facility]. Please see a copy of my visit note below.    No notes on file    Again, thank you for allowing me to participate in the care of your patient.      Sincerely,    Nita Brown MD

## 2021-06-22 NOTE — LETTER
6/22/2021      RE: Maria C Goodman  [address] [place] [address]       Dermatology Rooming Note    Maria C Goodman's goals for this visit include:   Chief Complaint   Patient presents with     Hair Loss     Here to follow up for hairloss.  Patient states that she feels it is going good.     Jessika Pittman, RN      [facility] Dermatology Note  Encounter Date: Jun 22, 2021  Office Visit     Dermatology Problem List:  1. 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 Psoriasis, managed by outside derm, well controlled on clobetasol oint prn    ____________________________________________    Assessment & Plan:     # Alopecia areata.  Improved since last visit. Discussed with patient that would not recommend stopping all treatments at once.   - continue to use fluocinolone 0.01% oil at least once weekly, may increase to twice weekly if tolerating  - continue to use minoxidil 5% foam daily  - If she notices increased hair loss, go back to previous regimen.     Follow-up: 4 month(s) in-person, or earlier for new or changing lesions    Staff and Resident:     Staffed with Dr. Brown.   ____________________________________________    CC: Hair Loss (Here to follow up for hairloss.  Patient states that she feels it is going good.)    HPI:  Ms. Maria C Goodman is a(n) 66 year old female who presents today for follow-up  for alopecia areata who says that she feels like her hair loss has significantly improved since last time and has actually stopped doing any treatments over the last month.   Says that her hair growth and scalp have been doing very well.     Patient is otherwise feeling well, without additional skin concerns.    Labs Reviewed:  N/A    Physical Exam:  Vitals: There were no  vitals taken for this visit.  SKIN: Focused examination of scalp, face, hands/nails was performed.  - there are alopecia patches of hair loss located on the bilateral inferior temporal scalp, vertex scalp, occipital scalp, but all areas with evidence of hair growth  - bilateral lateral eyebrows with decreased thickness of eyebrows   - normal appearing fingernails  - No other lesions of concern on areas examined.     Medications:  Current Outpatient Medications   Medication     ALBUTEROL IN     Calcium Carbonate-Vitamin D (CALCIUM + D PO)     cetirizine (ZYRTEC) 10 MG tablet     Cholecalciferol (VITAMIN D) 1000 UNITS capsule     clindamycin (CLEOCIN T) 1 % external lotion     clobetasol (TEMOVATE) 0.05 % external cream     clobetasol (TEMOVATE) 0.05 % ointment     clobetasol propionate (CLOBEX) 0.05 % external shampoo     escitalopram (LEXAPRO) 10 MG tablet     EXEMESTANE PO     famotidine (PEPCID) 10 MG tablet     ferrous gluconate (FERGON) 324 (38 FE) MG tablet     Fluocinolone Acetonide Scalp 0.01 % OIL oil     fluocinonide (LIDEX) 0.05 % external solution     ketoconazole (NIZORAL) 2 % shampoo     levothyroxine (SYNTHROID, LEVOTHROID) 112 MCG tablet     montelukast (SINGULAIR) 10 MG tablet     tacrolimus (PROTOPIC) 0.1 % ointment     tretinoin (RETIN-A) 0.025 % external cream     triamcinolone acetonide (KENALOG) 10 MG/ML injection     omalizumab (XOLAIR) 150 MG injection     Current Facility-Administered Medications   Medication     betamethasone acet & sod phos (CELESTONE) injection 6 mg     NONFORMULARY     triamcinolone acetonide (KENALOG-10) injection 10 mg     triamcinolone acetonide (KENALOG-10) injection 10 mg     triamcinolone acetonide (KENALOG-10) injection 20 mg     triamcinolone acetonide (KENALOG-10) injection 24 mg     triamcinolone acetonide (KENALOG-10) injection 30 mg      Past Medical History:   Patient Active Problem List   Diagnosis     Alopecia areata     Dermatitis     Hypertrichosis      Urticaria     Autoimmune disease (H)     Dermatitis, seborrheic     Past Medical History:   Diagnosis Date     Asthma      Hypothyroidism      Lobular breast cancer (H)     s/p chemotherapy and radiation, in remission     Multiple allergies        CC Referred Self, MD  No address on file on close of this encounter.        Nita Brown MD

## 2021-06-22 NOTE — PROGRESS NOTES
Dermatology Rooming Note    Maria C Goodman's goals for this visit include:   Chief Complaint   Patient presents with     Hair Loss     Here to follow up for hairloss.  Patient states that she feels it is going good.     Jessika Pittman RN

## 2021-06-22 NOTE — PATIENT INSTRUCTIONS
From today:  - continue to use fluocinolone 0.01% oil at least once weekly, may increase to twice weekly if tolerating  - continue to use minoxidil 5% foam daily    If you are having increased hair loss, go back to previous regimen.

## 2021-06-22 NOTE — PROGRESS NOTES
[facility] Dermatology Note  Encounter Date: Jun 22, 2021  Office Visit     Dermatology Problem List:  1. 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 Psoriasis, managed by outside derm, well controlled on clobetasol oint prn    ____________________________________________    Assessment & Plan:     # Alopecia areata.  Improved since last visit. Discussed with patient that would not recommend stopping all treatments at once.   - continue to use fluocinolone 0.01% oil at least once weekly, may increase to twice weekly if tolerating  - continue to use minoxidil 5% foam daily  - If she notices increased hair loss, go back to previous regimen.     Follow-up: 4 month(s) in-person, or earlier for new or changing lesions    Staff and Resident:     Staffed with Dr. Brown.     Patient was seen and examined with the medicine/dermatology resident. I agree with the history, review of systems, physical examination, assessments and plan.    Nita Brown MD  Professor and  Chair  Department of Dermatology  [facility]  ____________________________________________    CC: Hair Loss (Here to follow up for hairloss.  Patient states that she feels it is going good.)    HPI:  Ms. Maria C Goodman is a(n) 66 year old female who presents today for follow-up  for alopecia areata who says that she feels like her hair loss has significantly improved since last time and has actually stopped doing any treatments over the last month.   Says that her hair growth and scalp have been doing very well.     Patient is otherwise feeling well, without additional skin concerns.    Labs Reviewed:  N/A    Physical Exam:  Vitals: There were no vitals taken for this visit.  SKIN: Focused examination of scalp, face,  hands/nails was performed.  - there are alopecia patches of hair loss located on the bilateral inferior temporal scalp, vertex scalp, occipital scalp, but all areas with evidence of hair growth  - negative hair pull tests, no folliculitis  - bilateral lateral eyebrows with decreased thickness of eyebrows   - normal appearing fingernails  - No other lesions of concern on areas examined.     Medications:  Current Outpatient Medications   Medication     ALBUTEROL IN     Calcium Carbonate-Vitamin D (CALCIUM + D PO)     cetirizine (ZYRTEC) 10 MG tablet     Cholecalciferol (VITAMIN D) 1000 UNITS capsule     clindamycin (CLEOCIN T) 1 % external lotion     clobetasol (TEMOVATE) 0.05 % external cream     clobetasol (TEMOVATE) 0.05 % ointment     clobetasol propionate (CLOBEX) 0.05 % external shampoo     escitalopram (LEXAPRO) 10 MG tablet     EXEMESTANE PO     famotidine (PEPCID) 10 MG tablet     ferrous gluconate (FERGON) 324 (38 FE) MG tablet     Fluocinolone Acetonide Scalp 0.01 % OIL oil     fluocinonide (LIDEX) 0.05 % external solution     ketoconazole (NIZORAL) 2 % shampoo     levothyroxine (SYNTHROID, LEVOTHROID) 112 MCG tablet     montelukast (SINGULAIR) 10 MG tablet     tacrolimus (PROTOPIC) 0.1 % ointment     tretinoin (RETIN-A) 0.025 % external cream     triamcinolone acetonide (KENALOG) 10 MG/ML injection     omalizumab (XOLAIR) 150 MG injection     Current Facility-Administered Medications   Medication     betamethasone acet & sod phos (CELESTONE) injection 6 mg     NONFORMULARY     triamcinolone acetonide (KENALOG-10) injection 10 mg     triamcinolone acetonide (KENALOG-10) injection 10 mg     triamcinolone acetonide (KENALOG-10) injection 20 mg     triamcinolone acetonide (KENALOG-10) injection 24 mg     triamcinolone acetonide (KENALOG-10) injection 30 mg      Past Medical History:   Patient Active Problem List   Diagnosis     Alopecia areata     Dermatitis     Hypertrichosis     Urticaria     Autoimmune  disease (H)     Dermatitis, seborrheic     Past Medical History:   Diagnosis Date     Asthma      Hypothyroidism      Lobular breast cancer (H)     s/p chemotherapy and radiation, in remission     Multiple allergies        CC Referred Self, MD  No address on file on close of this encounter.

## 2021-09-03 ENCOUNTER — HOSPITAL ENCOUNTER (OUTPATIENT)
Age: 66
Discharge: HOME | End: 2021-09-03
Payer: COMMERCIAL

## 2021-09-03 VITALS
SYSTOLIC BLOOD PRESSURE: 116 MMHG | TEMPERATURE: 99 F | DIASTOLIC BLOOD PRESSURE: 60 MMHG | RESPIRATION RATE: 18 BRPM | OXYGEN SATURATION: 98 % | HEART RATE: 73 BPM

## 2021-09-03 DIAGNOSIS — H10.31 ACUTE BACTERIAL CONJUNCTIVITIS OF RIGHT EYE: Primary | ICD-10-CM

## 2021-09-03 PROCEDURE — 99203 OFFICE O/P NEW LOW 30 MIN: CPT | Performed by: NURSE PRACTITIONER

## 2021-09-03 RX ORDER — POLYMYXIN B SULFATE AND TRIMETHOPRIM 1; 10000 MG/ML; [USP'U]/ML
1 SOLUTION OPHTHALMIC
Qty: 10 ML | Refills: 0 | Status: SHIPPED | OUTPATIENT
Start: 2021-09-03 | End: 2021-09-08

## 2021-09-03 NOTE — ED PROVIDER NOTES
Patient Seen in: [facility]      History   Patient presents with:  Eye Problem    Stated Complaint: pink eye     HPI/Subjective:   HPI    This is a well appearing 60 y/o who presents with R eye irritation and drainage as well as redness sinc ill-appearing, toxic-appearing or diaphoretic. HENT:      Head: Normocephalic and atraumatic.       Right Ear: Tympanic membrane, ear canal and external ear normal.      Left Ear: Tympanic membrane, ear canal and external ear normal.      Nose: Nose shanna above.  Discussed with the patient we will treat with Polytrim at this time. She has tolerated this well in the past.  We also discussed supportive care and close follow-up. She does have an appoint with her ophthalmologist for next week.     Medical Abbe

## 2021-09-03 NOTE — ED INITIAL ASSESSMENT (HPI)
Pt here with right eye redness, itching and slight drainage that started this morning. Denies pain or visual changes.

## 2021-09-25 ENCOUNTER — HEALTH MAINTENANCE LETTER (OUTPATIENT)
Age: 66
End: 2021-09-25

## 2021-10-22 ENCOUNTER — LAB (OUTPATIENT)
Dept: LAB | Facility: CLINIC | Age: 66
End: 2021-10-22
Payer: COMMERCIAL

## 2021-10-22 ENCOUNTER — OFFICE VISIT (OUTPATIENT)
Dept: DERMATOLOGY | Facility: CLINIC | Age: 66
End: 2021-10-22
Payer: COMMERCIAL

## 2021-10-22 DIAGNOSIS — L63.9 ALOPECIA AREATA: ICD-10-CM

## 2021-10-22 DIAGNOSIS — L63.9 ALOPECIA AREATA: Primary | ICD-10-CM

## 2021-10-22 DIAGNOSIS — M25.60 MORNING STIFFNESS OF JOINTS: ICD-10-CM

## 2021-10-22 LAB — TSH SERPL DL<=0.005 MIU/L-ACNC: 0.82 MU/L (ref 0.4–4)

## 2021-10-22 PROCEDURE — 36415 COLL VENOUS BLD VENIPUNCTURE: CPT | Performed by: PATHOLOGY

## 2021-10-22 PROCEDURE — 11901 INJECT SKIN LESIONS >7: CPT | Mod: GC | Performed by: DERMATOLOGY

## 2021-10-22 PROCEDURE — 86376 MICROSOMAL ANTIBODY EACH: CPT | Mod: 90 | Performed by: PATHOLOGY

## 2021-10-22 PROCEDURE — 99213 OFFICE O/P EST LOW 20 MIN: CPT | Mod: 25 | Performed by: DERMATOLOGY

## 2021-10-22 PROCEDURE — 99000 SPECIMEN HANDLING OFFICE-LAB: CPT | Performed by: PATHOLOGY

## 2021-10-22 PROCEDURE — 84443 ASSAY THYROID STIM HORMONE: CPT | Performed by: PATHOLOGY

## 2021-10-22 ASSESSMENT — PAIN SCALES - GENERAL: PAINLEVEL: NO PAIN (0)

## 2021-10-22 NOTE — PROGRESS NOTES
[facility] Dermatology Note  Encounter Date: Oct 22, 2021  Office Visit     Dermatology Problem List:  # Alopecia areata  - Lidex solution, Clobex shampoo, H&S shampoo, Derma-Smoothe oil, minoxidil foam  - Allegra 180 mg morning, cetirizine at night  - Clindamycin lotn PRN  - s/p ILK most recently on 4/12/21, 1/12/21, 10/22/2021   - HairMetrix 9/15/20, 4/12/21  # Psoriasis, managed by outside derm, well controlled on clobetasol oint prn  # Joint pain / pos JESSICA - Rheumatology referral  ____________________________________________    Assessment & Plan:     # Alopecia areata.  Increased thinning since reducing treatment after improvement at last visit.  - Increase clobetasol shampoo to three times per week  - Continue to use fluocinolone 0.01% oil weekly - can increase to three times per week as needed  - Increase minoxidil 5% foam to daily use  - ILK today, see below  - Referral to rheumatology for JESSICA positivity and >1hr AM stiffness / diffuse joint pain  - Recheck TSH, TPO rick (w/ JESSICA positivity)    Procedures Performed:   - Intra-lesional triamcinolone procedure note. After positioning and cleansing with isopropyl alcohol, 2 total mL of triamcinolone 10 mg/mL was injected into 20 sites on the scalp. The patient tolerated the procedure well and left the dermatology clinic in good condition.    Follow-up: 3-4 month(s) in-person, or earlier for new or changing lesions    Staff and Scribe:     This patient was staffed with Dr. Brown.    Patricio Cross MD  Internal Medicine - Dermatology PGY 4    Scribe Disclosure:  I, Damon Vizcarra, am serving as a scribe to document services personally performed by Nita Brown MD based on data collection and the provider's statements to me.     Provider Disclosure:   The documentation recorded by the scribe accurately reflects the services I personally performed and the decisions made by me.    Patient was seen and examined with the  medicine/dermatology resident. I agree with the history, review of systems, physical examination, assessments and plan. ILK injections were done together.     Nita Brown MD  Professor and  Chair  Department of Dermatology  [facility]  ____________________________________________    CC: No chief complaint on file.    HPI:  Ms. Maria C Goodman is a 66 year old female who presents as a return patient for follow-up of hair loss, diagnosed as alopecia areata.   - Last seen in-clinic on 6/22/2021, at which time patient was continued on fluocinolone 0.01% oil and minoxidil 5% foam for treatment of alopecia areata.  - Today, patient also reports concern about autoimmune conditions due to combination of alopecia, psoriasis, joint problems, and plantar fasciitis, and she is interested in seeing rheumatology.  - Shedding or thinning, or both: thinning, patient reports not being diligent about treatments.  - Current tx: Clobetasol shampoo 1x/week, fluocinolone oil every couple of weeks, minoxidil 5% foam weekly  No Any new medications, supplements, or products? (please list below)     No Scalp pain   No Scalp burning   Yes Scalp itching    Yes Eyebrow changes    Yes Eyelash changes   No Beard changes    No Other body hair changes    No Nail changes    No Additional symptoms? (please list below)     Patient is otherwise feeling well, in usual state of health, and has no additional skin concerns today.     Labs:  JESSICA, TSH reviewed.    Physical Exam:  Vitals: There were no vitals taken for this visit.  GEN: Well developed, well-nourished, in no acute distress, in a pleasant mood.    SKIN: Focused examination of scalp and face was performed.  - Scaling diffusely with Ophiasis patterned loss / thinning and patchy loss of vertex scalp; some vellus fibers and admixed terminal hairs in areas of involvement   - thinning of eyebrow fibers laterally  - eyelash fibers are varying lengths.  - No other lesions of concern on  areas examined.     Medications:  Current Outpatient Medications   Medication     ALBUTEROL IN     Calcium Carbonate-Vitamin D (CALCIUM + D PO)     cetirizine (ZYRTEC) 10 MG tablet     Cholecalciferol (VITAMIN D) 1000 UNITS capsule     clindamycin (CLEOCIN T) 1 % external lotion     clobetasol (TEMOVATE) 0.05 % external cream     clobetasol (TEMOVATE) 0.05 % ointment     clobetasol propionate (CLOBEX) 0.05 % external shampoo     escitalopram (LEXAPRO) 10 MG tablet     EXEMESTANE PO     famotidine (PEPCID) 10 MG tablet     ferrous gluconate (FERGON) 324 (38 FE) MG tablet     Fluocinolone Acetonide Scalp 0.01 % OIL oil     fluocinonide (LIDEX) 0.05 % external solution     ketoconazole (NIZORAL) 2 % shampoo     levothyroxine (SYNTHROID, LEVOTHROID) 112 MCG tablet     montelukast (SINGULAIR) 10 MG tablet     omalizumab (XOLAIR) 150 MG injection     tacrolimus (PROTOPIC) 0.1 % ointment     tretinoin (RETIN-A) 0.025 % external cream     triamcinolone acetonide (KENALOG) 10 MG/ML injection     Current Facility-Administered Medications   Medication     betamethasone acet & sod phos (CELESTONE) injection 6 mg     NONFORMULARY     triamcinolone acetonide (KENALOG-10) injection 10 mg     triamcinolone acetonide (KENALOG-10) injection 10 mg     triamcinolone acetonide (KENALOG-10) injection 20 mg     triamcinolone acetonide (KENALOG-10) injection 24 mg     triamcinolone acetonide (KENALOG-10) injection 30 mg      Past Medical History:   Patient Active Problem List   Diagnosis     Alopecia areata     Dermatitis     Hypertrichosis     Urticaria     Autoimmune disease (H)     Dermatitis, seborrheic     Past Medical History:   Diagnosis Date     Asthma      Hypothyroidism      Lobular breast cancer (H)     s/p chemotherapy and radiation, in remission     Multiple allergies        CC No referring provider defined for this encounter. on close of this encounter.

## 2021-10-22 NOTE — LETTER
10/22/2021        RE: Maria C Goodman  [address] [place] [address]     Dear Colleague,    Thank you for referring your patient, Maria C Goodman, to the [facility] [place] at [facility]. Please see a copy of my visit note below.    [facility] Dermatology Note  Encounter Date: Oct 22, 2021  Office Visit     Dermatology Problem List:  1. 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 Psoriasis, managed by outside derm, well controlled on clobetasol oint prn  ____________________________________________    Assessment & Plan:     # Alopecia areata.  Improved since last visit. Discussed with patient that would not recommend stopping all treatments at once.   - continue to use fluocinolone 0.01% oil at least once weekly, may increase to twice weekly if tolerating  - continue to use minoxidil 5% foam daily  - If she notices increased hair loss, go back to previous regimen.     Procedures Performed:   {kkprocedurenotes:213725}    Follow-up: {kkfollowup:920857}    Staff and Scribe:     ***    Scribe Disclosure:  I, Damon Vizcarra, am serving as a scribe to document services personally performed by Nita Brown MD based on data collection and the provider's statements to me.   ____________________________________________    CC: No chief complaint on file.    HPI:  Ms. Maria C Goodman is a 66 year old female who presents as a return patient for follow-up of hair loss, diagnosed as alopecia areata.   - Last seen in-clinic on 6/22/2021, at which time patient was continued on fluocinolone 0.01% oil and minoxidil 5% foam for treatment of alopecia areata.  - Shedding or thinning, or both: ***  - Current tx:  ***  - If using Rogaine, 1 cannister lasts how long: ***   - Scalp or hair care habits/products: ***  ***Yes/No Any new medications, supplements, or products? (please list below)     ***Yes/No Scalp pain   ***Yes/No Scalp burning   ***Yes/No Scalp itching    ***Yes/No Eyebrow changes    ***Yes/No Eyelash changes   ***Yes/No Beard changes    ***Yes/No Other body hair changes    ***Yes/No Nail changes    ***Yes/No Additional symptoms? (please list below)     - Overall course: ***  - COVID status: ***yes/no/unknown, ***date(if known)    Patient is otherwise feeling well, in usual state of health, and has no additional skin concerns today.       Labs:  {kkreviewedlabs:919285} reviewed.    Physical Exam:  Vitals: There were no vitals taken for this visit.  GEN: Well developed, well-nourished, in no acute distress, in a pleasant mood.    SKIN: {kkSkinExam:661149}  - Kim type: ***  - Will part width of ***  - The layers of hair regrowth layers were noted to be  - *** cm for the first  - *** cm at the second  - *** cm at the third   - *** cm at the fourth   - *** diffuse erythema   - *** perifollicular erythema  - *** perifollicular scale   - *** scaling of the scalp   - *** negative hair pull test   - *** normal eyelash density  - *** normal eyebrow density  - *** no nail pitting or dystrophy   - *** scalp folliculitis/pustules   - In comparison to prior photographs, ***  - {Skin Exam Derm:646323}.   - No other lesions of concern on areas examined.     ***If FFA:  - R lateral forehead vein: elevated***/neutral***/depressed***  - Midline forehead vein: elevated***/neutral***/depressed***  - L ateral forehead vein: elevated***/neutral***/depressed***  - *** facial papules   - *** measurement of lateral canthus to lateral hairline   - *** measurement nasal bridge to anterior hairline    Medications:  Current Outpatient Medications   Medication     ALBUTEROL IN     Calcium Carbonate-Vitamin D (CALCIUM + D PO)      cetirizine (ZYRTEC) 10 MG tablet     Cholecalciferol (VITAMIN D) 1000 UNITS capsule     clindamycin (CLEOCIN T) 1 % external lotion     clobetasol (TEMOVATE) 0.05 % external cream     clobetasol (TEMOVATE) 0.05 % ointment     clobetasol propionate (CLOBEX) 0.05 % external shampoo     escitalopram (LEXAPRO) 10 MG tablet     EXEMESTANE PO     famotidine (PEPCID) 10 MG tablet     ferrous gluconate (FERGON) 324 (38 FE) MG tablet     Fluocinolone Acetonide Scalp 0.01 % OIL oil     fluocinonide (LIDEX) 0.05 % external solution     ketoconazole (NIZORAL) 2 % shampoo     levothyroxine (SYNTHROID, LEVOTHROID) 112 MCG tablet     montelukast (SINGULAIR) 10 MG tablet     omalizumab (XOLAIR) 150 MG injection     tacrolimus (PROTOPIC) 0.1 % ointment     tretinoin (RETIN-A) 0.025 % external cream     triamcinolone acetonide (KENALOG) 10 MG/ML injection     Current Facility-Administered Medications   Medication     betamethasone acet & sod phos (CELESTONE) injection 6 mg     NONFORMULARY     triamcinolone acetonide (KENALOG-10) injection 10 mg     triamcinolone acetonide (KENALOG-10) injection 10 mg     triamcinolone acetonide (KENALOG-10) injection 20 mg     triamcinolone acetonide (KENALOG-10) injection 24 mg     triamcinolone acetonide (KENALOG-10) injection 30 mg      Past Medical History:   Patient Active Problem List   Diagnosis     Alopecia areata     Dermatitis     Hypertrichosis     Urticaria     Autoimmune disease (H)     Dermatitis, seborrheic     Past Medical History:   Diagnosis Date     Asthma      Hypothyroidism      Lobular breast cancer (H)     s/p chemotherapy and radiation, in remission     Multiple allergies        CC No referring provider defined for this encounter. on close of this encounter.      Again, thank you for allowing me to participate in the care of your patient.      Sincerely,    Nita Brown MD

## 2021-10-22 NOTE — LETTER
10/22/2021      RE: Maria C Goodman  [address] [place] [address]       [facility] Dermatology Note  Encounter Date: Oct 22, 2021  Office Visit     Dermatology Problem List:  # Alopecia areata  - Lidex solution, Clobex shampoo, H&S shampoo, Derma-Smoothe oil, minoxidil foam  - Allegra 180 mg morning, cetirizine at night  - Clindamycin lotn PRN  - s/p ILK most recently on 4/12/21, 1/12/21, 10/22/2021   - HairMetrix 9/15/20, 4/12/21  # Psoriasis, managed by outside derm, well controlled on clobetasol oint prn  # Joint pain / pos JESSICA - Rheumatology referral  ____________________________________________    Assessment & Plan:     # Alopecia areata.  Increased thinning since reducing treatment after improvement at last visit.  - Increase clobetasol shampoo to three times per week  - Continue to use fluocinolone 0.01% oil weekly - can increase to three times per week as needed  - Increase minoxidil 5% foam to daily use  - ILK today, see below  - Referral to rheumatology for JESSICA positivity and >1hr AM stiffness / diffuse joint pain  - Recheck TSH, TPO rick (w/ JESSICA positivity)    Procedures Performed:   - Intra-lesional triamcinolone procedure note. After positioning and cleansing with isopropyl alcohol, 2 total mL of triamcinolone 10 mg/mL was injected into 20 lesion(s) on the scalp. The patient tolerated the procedure well and left the dermatology clinic in good condition.    Follow-up: 3-4 month(s) in-person, or earlier for new or changing lesions    Staff and Scribe:     This patient was staffed with Dr. Brown.    Patricio Cross MD  Internal Medicine - Dermatology PGY 4    Scribe Disclosure:  Damon EWING, am serving as a scribe to document services personally performed by Nita Brown MD based on data collection and the provider's statements to me.   ____________________________________________    CC: No chief complaint on file.    HPI:  Ms. Maria C Goodman  is a 66 year old female who presents as a return patient for follow-up of hair loss, diagnosed as alopecia areata.   - Last seen in-clinic on 6/22/2021, at which time patient was continued on fluocinolone 0.01% oil and minoxidil 5% foam for treatment of alopecia areata.  - Today, patient also reports concern about autoimmune conditions due to combination of alopecia, psoriasis, joint problems, and plantar fasciitis, and she is interested in seeing rheumatology.  - Shedding or thinning, or both: thinning, patient reports not being diligent about treatments.  - Current tx: Clobetasol shampoo 1x/week, fluocinolone oil every couple of weeks, minoxidil 5% foam weekly  No Any new medications, supplements, or products? (please list below)     No Scalp pain   No Scalp burning   Yes Scalp itching    Yes Eyebrow changes    Yes Eyelash changes   No Beard changes    No Other body hair changes    No Nail changes    No Additional symptoms? (please list below)     Patient is otherwise feeling well, in usual state of health, and has no additional skin concerns today.     Labs:  JESSICA, TSH reviewed.    Physical Exam:  Vitals: There were no vitals taken for this visit.  GEN: Well developed, well-nourished, in no acute distress, in a pleasant mood.    SKIN: Focused examination of scalp was performed.  - Scaling diffusely with Ophiasis patterned loss / thinning and patchy loss of vertex scalp; some vellus fibers and admixed terminal hairs in areas of involvement   - thinning of eyebrow fiber laterally  - eyelash fibers are varying lengths.  - No other lesions of concern on areas examined.     Medications:  Current Outpatient Medications   Medication     ALBUTEROL IN     Calcium Carbonate-Vitamin D (CALCIUM + D PO)     cetirizine (ZYRTEC) 10 MG tablet     Cholecalciferol (VITAMIN D) 1000 UNITS capsule     clindamycin (CLEOCIN T) 1 % external lotion     clobetasol (TEMOVATE) 0.05 % external cream     clobetasol (TEMOVATE) 0.05 % ointment      clobetasol propionate (CLOBEX) 0.05 % external shampoo     escitalopram (LEXAPRO) 10 MG tablet     EXEMESTANE PO     famotidine (PEPCID) 10 MG tablet     ferrous gluconate (FERGON) 324 (38 FE) MG tablet     Fluocinolone Acetonide Scalp 0.01 % OIL oil     fluocinonide (LIDEX) 0.05 % external solution     ketoconazole (NIZORAL) 2 % shampoo     levothyroxine (SYNTHROID, LEVOTHROID) 112 MCG tablet     montelukast (SINGULAIR) 10 MG tablet     omalizumab (XOLAIR) 150 MG injection     tacrolimus (PROTOPIC) 0.1 % ointment     tretinoin (RETIN-A) 0.025 % external cream     triamcinolone acetonide (KENALOG) 10 MG/ML injection     Current Facility-Administered Medications   Medication     betamethasone acet & sod phos (CELESTONE) injection 6 mg     NONFORMULARY     triamcinolone acetonide (KENALOG-10) injection 10 mg     triamcinolone acetonide (KENALOG-10) injection 10 mg     triamcinolone acetonide (KENALOG-10) injection 20 mg     triamcinolone acetonide (KENALOG-10) injection 24 mg     triamcinolone acetonide (KENALOG-10) injection 30 mg      Past Medical History:   Patient Active Problem List   Diagnosis     Alopecia areata     Dermatitis     Hypertrichosis     Urticaria     Autoimmune disease (H)     Dermatitis, seborrheic     Past Medical History:   Diagnosis Date     Asthma      Hypothyroidism      Lobular breast cancer (H)     s/p chemotherapy and radiation, in remission     Multiple allergies        CC No referring provider defined for this encounter. on close of this encounter.      Nita Brown MD

## 2021-10-22 NOTE — NURSING NOTE
Drug Administration Record    Prior to injection, verified patient identity using patient's name and date of birth.  Due to injection administration, patient instructed to remain in clinic for 15 minutes  afterwards, and to report any adverse reaction to me immediately.    Drug Name: triamcinolone acetonide(kenalog)  Dose: 2mL of triamcinolone 10mg/mL, 20mg dose  Route administered: ID  NDC #: Kenalog-10 (2720-1078-87)  Amount of waste(mL):3mL  Reason for waste: Single use vial    LOT #: [id number]  SITE: see provider's note  : [employer]  EXPIRATION DATE: 03/2023

## 2021-10-22 NOTE — PATIENT INSTRUCTIONS
Resume prior regimen. Shampoo three times per week. Minoxidil daily. Fluocinolone oil at least weekly; can increase to three times per week as needed.    We placed rheumatology referral and ordered some labs for your thyroid.    We will see you back in 3 months time.

## 2021-10-22 NOTE — NURSING NOTE
Dermatology Rooming Note    Maria C Goodman's goals for this visit include:   Chief Complaint   Patient presents with     Hair Loss     Here for hair loss follow up     Jessika Pittman RN

## 2021-10-25 ENCOUNTER — TELEPHONE (OUTPATIENT)
Dept: DERMATOLOGY | Facility: CLINIC | Age: 66
End: 2021-10-25
Payer: COMMERCIAL

## 2021-10-25 LAB — THYROPEROXIDASE AB SERPL-ACNC: 52 IU/ML

## 2021-10-25 NOTE — TELEPHONE ENCOUNTER
"[facility] Call Center    Phone Message    May a detailed message be left on voicemail: yes     Reason for Call: Appointment Intake    Referring Provider Name: Nita Brown MD in [facility] DERMATOLOGY  Diagnosis and/or Symptoms: Alopecia areata [L63.9]  Morning stiffness of joints [M25.60]    Additional notes from referring provider states \"Hair loss patient. Elevated JESSICA (TPO rick pending given worsened hypothyroidism). 1 hour AM stiffness; diffuse polyarticular pain.\"    Pt would like to go to the [facility] as this is where her doctor recommended.     Action Taken: Message routed to:  [facility] Rheumatology     Travel Screening: Not Applicable    "

## 2021-11-03 NOTE — TELEPHONE ENCOUNTER
Review of chart shows +JESSICA 1:1280 centromere pattern.  Joint pain. All other rheumatology labs are negative. Last saw rheumatology in 2010.  Requesting to see rheumatology again.     Ok to schedule in fellow's clinic.    Connie Bui RN  Rheumatology Clinic

## 2021-12-08 ENCOUNTER — LAB (OUTPATIENT)
Dept: LAB | Facility: CLINIC | Age: 66
End: 2021-12-08
Attending: INTERNAL MEDICINE
Payer: COMMERCIAL

## 2021-12-08 ENCOUNTER — OFFICE VISIT (OUTPATIENT)
Dept: RHEUMATOLOGY | Facility: CLINIC | Age: 66
End: 2021-12-08
Attending: DERMATOLOGY
Payer: COMMERCIAL

## 2021-12-08 VITALS
SYSTOLIC BLOOD PRESSURE: 129 MMHG | DIASTOLIC BLOOD PRESSURE: 72 MMHG | BODY MASS INDEX: 28.48 KG/M2 | WEIGHT: 166.8 LBS | HEIGHT: 64 IN | HEART RATE: 89 BPM | TEMPERATURE: 98.1 F

## 2021-12-08 DIAGNOSIS — M25.60 MORNING STIFFNESS OF JOINTS: ICD-10-CM

## 2021-12-08 DIAGNOSIS — R76.8 POSITIVE ANA (ANTINUCLEAR ANTIBODY): Primary | ICD-10-CM

## 2021-12-08 DIAGNOSIS — L63.9 ALOPECIA AREATA: ICD-10-CM

## 2021-12-08 DIAGNOSIS — M35.9 AUTOIMMUNE DISEASE (H): ICD-10-CM

## 2021-12-08 LAB
ALBUMIN UR-MCNC: NEGATIVE MG/DL
APPEARANCE UR: CLEAR
BILIRUB UR QL STRIP: NEGATIVE
COLOR UR AUTO: YELLOW
GLUCOSE UR STRIP-MCNC: NEGATIVE MG/DL
HGB UR QL STRIP: NEGATIVE
KETONES UR STRIP-MCNC: NEGATIVE MG/DL
LEUKOCYTE ESTERASE UR QL STRIP: NEGATIVE
MUCOUS THREADS #/AREA URNS LPF: PRESENT /LPF
NITRATE UR QL: NEGATIVE
PH UR STRIP: 5 [PH] (ref 5–7)
RBC URINE: 0 /HPF
SP GR UR STRIP: 1.02 (ref 1–1.03)
UROBILINOGEN UR STRIP-MCNC: NORMAL MG/DL
WBC URINE: 0 /HPF

## 2021-12-08 PROCEDURE — G0463 HOSPITAL OUTPT CLINIC VISIT: HCPCS

## 2021-12-08 PROCEDURE — 81001 URINALYSIS AUTO W/SCOPE: CPT | Performed by: PATHOLOGY

## 2021-12-08 PROCEDURE — 86235 NUCLEAR ANTIGEN ANTIBODY: CPT | Mod: 90 | Performed by: PATHOLOGY

## 2021-12-08 PROCEDURE — 36415 COLL VENOUS BLD VENIPUNCTURE: CPT | Performed by: PATHOLOGY

## 2021-12-08 PROCEDURE — 99204 OFFICE O/P NEW MOD 45 MIN: CPT | Performed by: STUDENT IN AN ORGANIZED HEALTH CARE EDUCATION/TRAINING PROGRAM

## 2021-12-08 PROCEDURE — 99000 SPECIMEN HANDLING OFFICE-LAB: CPT | Performed by: PATHOLOGY

## 2021-12-08 RX ORDER — AZELASTINE 1 MG/ML
SPRAY, METERED NASAL
COMMUNITY
Start: 2021-07-14

## 2021-12-08 ASSESSMENT — PAIN SCALES - GENERAL: PAINLEVEL: NO PAIN (0)

## 2021-12-08 ASSESSMENT — MIFFLIN-ST. JEOR: SCORE: 1291.6

## 2021-12-08 NOTE — NURSING NOTE
"Chief Complaint   Patient presents with     Consult     establish care with alopecia areata     /72   Pulse 89   Temp 98.1  F (36.7  C) (Oral)   Ht 1.626 m (5' 4\")   Wt 75.7 kg (166 lb 12.8 oz)   BMI 28.63 kg/m    Federica Wood CMA on 12/8/2021 at 8:03 AM    "

## 2021-12-08 NOTE — PROGRESS NOTES
Rheumatology Clinic Initial Consult  12/08/21    Reason for visit: JESSICA positive (Anticentromere pattern)     HPI:  Maria C Goodman is a 66 year old female with a history of alopecia areata, Psoriasis, hypothyroidism and Asthma who is referred for further evaluation of positive JESSICA (1: 1280 centromere pattern).  Patient reports that she gets occasional joint pain mainly in the right shoulder. She denies any pain or stiffness in her hands, wrist, elbows, knees or feet joints. She hx of reflux that remains well controlled with famotidine. She denies difficulty swelling, dysmotility, skin tightness, skin tightening on fingers,  raynaud's, telangiectasias, photosensitivity dry eyes, dry mouth or digital pitting/ ulceration.   Her mother had a hx of rheumatoid arthritis and she wanted to be evaluated for any rheumatologic autoimmune condition. She has few small patches of psoriasis on left lower extremity and upper extremity. Psoriasis is under well control with topicals. She is currently on Xolair for allergies. She denies fever, chills, diarrhea, abdominal pain, urinary sx, hematuria or any weight changes    14 point review of systems collected and negative if not documented above.     Past Medical History  Past Medical History:   Diagnosis Date     Asthma      Hypothyroidism      Lobular breast cancer (H)     s/p chemotherapy and radiation, in remission     Multiple allergies      No past surgical history on file.  Social History     Socioeconomic History     Marital status:      Spouse name: Not on file     Number of children: Not on file     Years of education: Not on file     Highest education level: Not on file   Occupational History     Not on file   Tobacco Use     Smoking status: Never Smoker     Smokeless tobacco: Never Used   Substance and Sexual Activity     Alcohol use: Not on file     Drug use: Not on file     Sexual activity: Not on file   Other Topics Concern     Parent/sibling w/ CABG, MI or  angioplasty before 65F 55M? Not Asked   Social History Narrative     Not on file     Social Determinants of Health     Financial Resource Strain: Not on file   Food Insecurity: Not on file   Transportation Needs: Not on file   Physical Activity: Not on file   Stress: Not on file   Social Connections: Not on file   Intimate Partner Violence: Not on file   Housing Stability: Not on file     Family History   Problem Relation Age of Onset     Rheumatoid Arthritis Mother      Cancer No family hx of         skin cancer     Skin Cancer No family hx of         no skin cancer       Medications:  Current Outpatient Medications   Medication     ALBUTEROL IN     azelastine (ASTELIN) 0.1 % nasal spray     Calcium Carbonate-Vitamin D (CALCIUM + D PO)     cetirizine (ZYRTEC) 10 MG tablet     clobetasol (TEMOVATE) 0.05 % ointment     clobetasol propionate (CLOBEX) 0.05 % external shampoo     escitalopram (LEXAPRO) 10 MG tablet     EXEMESTANE PO     famotidine (PEPCID) 10 MG tablet     ferrous gluconate (FERGON) 324 (38 FE) MG tablet     Fluocinolone Acetonide Scalp 0.01 % OIL oil     fluocinonide (LIDEX) 0.05 % external solution     ketoconazole (NIZORAL) 2 % shampoo     levothyroxine (SYNTHROID, LEVOTHROID) 112 MCG tablet     montelukast (SINGULAIR) 10 MG tablet     omalizumab (XOLAIR) 150 MG injection     tacrolimus (PROTOPIC) 0.1 % ointment     triamcinolone acetonide (KENALOG) 10 MG/ML injection     Cholecalciferol (VITAMIN D) 1000 UNITS capsule     clindamycin (CLEOCIN T) 1 % external lotion     clobetasol (TEMOVATE) 0.05 % external cream     tretinoin (RETIN-A) 0.025 % external cream     Current Facility-Administered Medications   Medication     betamethasone acet & sod phos (CELESTONE) injection 6 mg     NONFORMULARY     triamcinolone acetonide (KENALOG-10) injection 10 mg     triamcinolone acetonide (KENALOG-10) injection 10 mg     triamcinolone acetonide (KENALOG-10) injection 10 mg     triamcinolone acetonide (KENALOG-10)  injection 20 mg     triamcinolone acetonide (KENALOG-10) injection 24 mg     triamcinolone acetonide (KENALOG-10) injection 30 mg       Allergies:     Allergies   Allergen Reactions     Acetaminophen Hives     Penicillins Hives     Shellfish Allergy GI Disturbance     Silver Nitrate Hives     tegaderm adhesive dressing      Sulfa Drugs Hives     Vicodin [Hydrocodone-Acetaminophen] Hives       Physical Exam:  Constitutional: well-developed, appearing stated age; cooperative,  Skin: psoriatic patches on extremities, thinning and patchy loss of hair on vertex,  no nail pitting, nodules or lesions, faint single teleangiectasia on front R chest and chin  Eyes: nl EOM, vision, conjunctiva, sclera,   Neck: no mass, non-tender thyroid  Resp: lungs clear to auscultation, breathing comfortably on room air  CV: RRR, no murmurs, rubs or gallops, no edema  GI: soft, non-tender, non-distended  Lymph: no cervical, supraclavicular, or epitrochlear nodes  Neuro: nl cranial nerves, strength, sensation,   Psych: nl judgement, orientation, memory, affect.  MS: The TMJ, neck, shoulder, elbow, wrist, MCP/PIP/DIP, spine, hip, knee, ankle, and MTP/IP joints were examined and found normal. No active synovitis or altered joint anatomy. Full joint ROM. Normal  strength. No dactylitis,  tenosynovitis, enthesopathy.    Lab/Imaging: Reviewed recent labs and imaging.     Assessment/Plan:     67 yo female with hx of Psoriasis, Hypothyroidism (TPO +), alopecia areata and hives presented for evaluation of JESSICA positivity. She has high titer JESSICA with centromere pattern. This pattern is commonly associated with limited scleroderma (CREST syndrome). She doesn't have raynaud's, skin tightness, sclerodactyly, dysmotility or any other features of this condition except mild reflux. She has felt some worsening of reflux symptoms recently. Autoimmune work up including KAREN, RF, CCP and inflammatory markers have remained normal. Given her unremarkable  extensive review of system and physical exam - She does't seem to have any autoimmune connective tissue disease at this time. We plan to repeat UA and centromere ab that hasn't been checked recently. Patient is advised to follow up sooner in case she develops any symptoms or signs related to scleroderma/any other autoimmune condition.     Plan  Check Anti Centromere Ab and UA   Follow up in 1 year      The patient was seen and staffed with Dr. Mike MD.       Obinna Dunaway MD  Rheumatology Fellow  Pager [phone]    Staff addendum  Apart from very mild telangectasias on chest and chin, no other clinical findings suggestive of limited cutaneous systemic sclerosis. GERD is chronic and not progressive. No dysphagia. No raynauds. No calcinosis. No sclerodactyly. Will confirm the anti centromere pattern with centromere AB though will expect this to be positive. Patient counseled to let us know should she develop any symptoms consistent with the disease she needs to let us know so that we can see her back in clinic sooner. Also, she should reach out with any new/evolving symptoms. She understands. She has good understanding of the plan.     I performed the history and physical examination of the patient and discussed the management with the fellow. I reviewed the available lab and imaging studies. I reviewed the fellow's note and agree with the documented findings and plan of care.    Mahesh Mckeon MD  Rheumatology

## 2021-12-08 NOTE — LETTER
12/8/2021       RE: Maria C Goodman  [address] [place] [address]     Dear Colleague,    Thank you for referring your patient, Maria C Goodman, to the [facility] RHEUMATOLOGY [facility]INIC [place] [facility]. Please see a copy of my visit note below.    Rheumatology Clinic Initial Consult  12/08/21    Reason for visit: JESSICA positive (Anticentromere pattern)     HPI:  Maria C Goodman is a 66 year old female with a history of alopecia areata, Psoriasis, hypothyroidism and Asthma who is referred for further evaluation of positive JESSICA (1: 1280 centromere pattern).  Patient reports that she gets occasional joint pain mainly in the right shoulder. She denies any pain or stiffness in her hands, wrist, elbows, knees or feet joints. She hx of reflux that remains well controlled with famotidine. She denies difficulty swelling, dysmotility, skin tightness, skin tightening on fingers,  raynaud's, telangiectasias, photosensitivity dry eyes, dry mouth or digital pitting/ ulceration.   Her mother had a hx of rheumatoid arthritis and she wanted to be evaluated for any rheumatologic autoimmune condition. She has few small patches of psoriasis on left lower extremity and upper extremity. Psoriasis is under well control with topicals. She is currently on Xolair for allergies. She denies fever, chills, diarrhea, abdominal pain, urinary sx, hematuria or any weight changes    14 point review of systems collected and negative if not documented above.     Past Medical History  Past Medical History:   Diagnosis Date     Asthma      Hypothyroidism      Lobular breast cancer (H)     s/p chemotherapy and radiation, in remission     Multiple allergies      No past surgical history on file.  Social History     Socioeconomic History     Marital status:      Spouse name: Not on file     Number of children: Not on file     Years of education: Not on file     Highest education  level: Not on file   Occupational History     Not on file   Tobacco Use     Smoking status: Never Smoker     Smokeless tobacco: Never Used   Substance and Sexual Activity     Alcohol use: Not on file     Drug use: Not on file     Sexual activity: Not on file   Other Topics Concern     Parent/sibling w/ CABG, MI or angioplasty before 65F 55M? Not Asked   Social History Narrative     Not on file     Social Determinants of Health     Financial Resource Strain: Not on file   Food Insecurity: Not on file   Transportation Needs: Not on file   Physical Activity: Not on file   Stress: Not on file   Social Connections: Not on file   Intimate Partner Violence: Not on file   Housing Stability: Not on file     Family History   Problem Relation Age of Onset     Rheumatoid Arthritis Mother      Cancer No family hx of         skin cancer     Skin Cancer No family hx of         no skin cancer       Medications:  Current Outpatient Medications   Medication     ALBUTEROL IN     azelastine (ASTELIN) 0.1 % nasal spray     Calcium Carbonate-Vitamin D (CALCIUM + D PO)     cetirizine (ZYRTEC) 10 MG tablet     clobetasol (TEMOVATE) 0.05 % ointment     clobetasol propionate (CLOBEX) 0.05 % external shampoo     escitalopram (LEXAPRO) 10 MG tablet     EXEMESTANE PO     famotidine (PEPCID) 10 MG tablet     ferrous gluconate (FERGON) 324 (38 FE) MG tablet     Fluocinolone Acetonide Scalp 0.01 % OIL oil     fluocinonide (LIDEX) 0.05 % external solution     ketoconazole (NIZORAL) 2 % shampoo     levothyroxine (SYNTHROID, LEVOTHROID) 112 MCG tablet     montelukast (SINGULAIR) 10 MG tablet     omalizumab (XOLAIR) 150 MG injection     tacrolimus (PROTOPIC) 0.1 % ointment     triamcinolone acetonide (KENALOG) 10 MG/ML injection     Cholecalciferol (VITAMIN D) 1000 UNITS capsule     clindamycin (CLEOCIN T) 1 % external lotion     clobetasol (TEMOVATE) 0.05 % external cream     tretinoin (RETIN-A) 0.025 % external cream     Current  Facility-Administered Medications   Medication     betamethasone acet & sod phos (CELESTONE) injection 6 mg     NONFORMULARY     triamcinolone acetonide (KENALOG-10) injection 10 mg     triamcinolone acetonide (KENALOG-10) injection 10 mg     triamcinolone acetonide (KENALOG-10) injection 10 mg     triamcinolone acetonide (KENALOG-10) injection 20 mg     triamcinolone acetonide (KENALOG-10) injection 24 mg     triamcinolone acetonide (KENALOG-10) injection 30 mg       Allergies:     Allergies   Allergen Reactions     Acetaminophen Hives     Penicillins Hives     Shellfish Allergy GI Disturbance     Silver Nitrate Hives     tegaderm adhesive dressing      Sulfa Drugs Hives     Vicodin [Hydrocodone-Acetaminophen] Hives       Physical Exam:  Constitutional: well-developed, appearing stated age; cooperative,  Skin: psoriatic patches on extremities, thinning and patchy loss of hair on vertex,  no nail pitting, nodules or lesions, faint single teleangiectasia on front R chest and chin  Eyes: nl EOM, vision, conjunctiva, sclera,   Neck: no mass, non-tender thyroid  Resp: lungs clear to auscultation, breathing comfortably on room air  CV: RRR, no murmurs, rubs or gallops, no edema  GI: soft, non-tender, non-distended  Lymph: no cervical, supraclavicular, or epitrochlear nodes  Neuro: nl cranial nerves, strength, sensation,   Psych: nl judgement, orientation, memory, affect.  MS: The TMJ, neck, shoulder, elbow, wrist, MCP/PIP/DIP, spine, hip, knee, ankle, and MTP/IP joints were examined and found normal. No active synovitis or altered joint anatomy. Full joint ROM. Normal  strength. No dactylitis,  tenosynovitis, enthesopathy.    Lab/Imaging: Reviewed recent labs and imaging.     Assessment/Plan:     65 yo female with hx of Psoriasis, Hypothyroidism (TPO +), alopecia areata and hives presented for evaluation of JESSICA positivity. She has high titer JESSICA with centromere pattern. This pattern is commonly associated with  limited scleroderma (CREST syndrome). She doesn't have raynaud's, skin tightness, sclerodactyly, dysmotility or any other features of this condition except mild reflux. She has felt some worsening of reflux symptoms recently. Autoimmune work up including KAREN, RF, CCP and inflammatory markers have remained normal. Given her unremarkable extensive review of system and physical exam - She does't seem to have any autoimmune connective tissue disease at this time. We plan to repeat UA and centromere ab that hasn't been checked recently. Patient is advised to follow up sooner in case she develops any symptoms or signs related to scleroderma/any other autoimmune condition.     Plan  Check Anti Centromere Ab and UA   Follow up in 1 year      The patient was seen and staffed with Dr. Mike MD.       Obinna Dunaway MD  Rheumatology Fellow  Pager [phone]    Staff addendum  Apart from very mild telangectasias on chest and chin, no other clinical findings suggestive of limited cutaneous systemic sclerosis. GERD is chronic and not progressive. No dysphagia. No raynauds. No calcinosis. No sclerodactyly. Will confirm the anti centromere pattern with centromere AB though will expect this to be positive. Patient counseled to let us know should she develop any symptoms consistent with the disease she needs to let us know so that we can see her back in clinic sooner. Also, she should reach out with any new/evolving symptoms. She understands. She has good understanding of the plan.     I performed the history and physical examination of the patient and discussed the management with the fellow. I reviewed the available lab and imaging studies. I reviewed the fellow's note and agree with the documented findings and plan of care.    Mahesh Mckeon MD  Rheumatology              Again, thank you for allowing me to participate in the care of your patient.      Sincerely,    Obinna Dunaway MD

## 2021-12-10 LAB
CENTROMERE B AB SER-ACNC: 33 U/ML
CENTROMERE B AB SER-ACNC: POSITIVE

## 2021-12-13 ENCOUNTER — TELEPHONE (OUTPATIENT)
Dept: RHEUMATOLOGY | Facility: CLINIC | Age: 66
End: 2021-12-13
Payer: COMMERCIAL

## 2021-12-13 NOTE — TELEPHONE ENCOUNTER
"Patient has the following question, \"is there any change or do I need to be seen given the results of Anticentromere Ab and UA? I have many questions, should we arrange a virtual visit?\"    Please respond via Mychart or phone call.       "

## 2022-01-15 ENCOUNTER — HEALTH MAINTENANCE LETTER (OUTPATIENT)
Age: 67
End: 2022-01-15

## 2022-01-31 ENCOUNTER — OFFICE VISIT (OUTPATIENT)
Dept: DERMATOLOGY | Facility: CLINIC | Age: 67
End: 2022-01-31
Payer: COMMERCIAL

## 2022-01-31 VITALS — SYSTOLIC BLOOD PRESSURE: 117 MMHG | HEART RATE: 78 BPM | DIASTOLIC BLOOD PRESSURE: 73 MMHG

## 2022-01-31 DIAGNOSIS — L63.9 ALOPECIA AREATA: ICD-10-CM

## 2022-01-31 DIAGNOSIS — L40.9 PSORIASIS: Primary | ICD-10-CM

## 2022-01-31 DIAGNOSIS — L21.9 DERMATITIS, SEBORRHEIC: ICD-10-CM

## 2022-01-31 PROCEDURE — 99214 OFFICE O/P EST MOD 30 MIN: CPT | Mod: 25 | Performed by: DERMATOLOGY

## 2022-01-31 PROCEDURE — 11901 INJECT SKIN LESIONS >7: CPT | Mod: GC | Performed by: DERMATOLOGY

## 2022-01-31 RX ORDER — CLOBETASOL PROPIONATE 0.05 G/100ML
SHAMPOO TOPICAL
Qty: 118 ML | Refills: 11 | Status: SHIPPED | OUTPATIENT
Start: 2022-01-31 | End: 2022-06-13

## 2022-01-31 RX ORDER — FLUOCINOLONE ACETONIDE 0.11 MG/ML
OIL TOPICAL
Qty: 118.28 ML | Refills: 5 | Status: SHIPPED | OUTPATIENT
Start: 2022-01-31 | End: 2022-06-13

## 2022-01-31 RX ORDER — CLOBETASOL PROPIONATE 0.5 MG/G
CREAM TOPICAL
Qty: 60 G | Refills: 1 | Status: SHIPPED | OUTPATIENT
Start: 2022-01-31 | End: 2023-04-11 | Stop reason: ALTCHOICE

## 2022-01-31 RX ORDER — CLOBETASOL PROPIONATE 0.5 MG/G
OINTMENT TOPICAL
Qty: 60 G | Refills: 1 | Status: SHIPPED | OUTPATIENT
Start: 2022-01-31 | End: 2023-04-11 | Stop reason: ALTCHOICE

## 2022-01-31 ASSESSMENT — PAIN SCALES - GENERAL: PAINLEVEL: NO PAIN (0)

## 2022-01-31 NOTE — PROGRESS NOTES
[facility] Dermatology Note  Encounter Date: Jan 31, 2022  Office Visit     Dermatology Problem List:  # Alopecia areata  - Clobex shampoo, H&S shampoo, Derma-Smoothe oil, minoxidil foam, Allegra 180 mg morning, cetirizine at night  - Prior: lidex soln, clindamycin lotn PRN  - s/p ILK most recently on 4/12/21, 1/12/21, 10/22/2021, 1/31/22  - HairMetrix 9/15/20, 4/12/21, 1/31/22  # Psoriasis, managed by outside derm, well controlled on clobetasol oint prn  # Joint pain/pos JESSICA   - Saw rheum 12/2021. No concern for inflammatory arthritis. Pt plans for second opinion at [facility].  ____________________________________________    Assessment & Plan:     # Alopecia areata  Overall somewhat in proved with ILK. Did have a lapse in home topical regimen with some appreciable perifollicular and loose scale on exam today that we need to get under control. She does have some appreciable eyelash thinning on exam today. We could consider starting Latisse if her eye doctor is okay with this.  - Continue alternating Clobex shampoo and H&S shampoo, continue dermasmoothe oil 1x weekly, continue minoxidil daily  - ILK today, see below  - Discuss Latisse with ophthalmology     # Joint pain/positive JESSICA  Saw rheum 12/2021 for JESSICA positivity and >1hr AM stiffness/diffuse joint pain, no concern for inflammatory arthritis at present per their note. Pt seeking a second opinion at [facility]    Procedures Performed:   - Intra-lesional triamcinolone procedure note. After positioning and cleansing with isopropyl alcohol, 2 total mL of triamcinolone 10 mg/mL was injected into 20 sites on the vertex, R occipital scalp. The patient tolerated the procedure well and left the dermatology clinic in good condition.    Follow-up: 4 month(s) in-person, or earlier for new or changing lesions    Staff and Resident:     Tana Frederick MD  Dermatology Resident PGY2    Patient was seen and examined with the dermatology resident. I agree with the  history, review of systems, physical examination, assessments and plan. ILK injections were done together.    Nita Brown MD  Professor and  Chair  Department of Dermatology  [facility]  ____________________________________________    CC: Hair Loss (Maria C reports no major changes, wants to do hair metrics.)    HPI:  Ms. Maria C Goodman is a 66 year old female who presents as a return patient for follow-up of hair loss, diagnosed as alopecia areata. After her last visit she was not very compliant with her home regimen given she was quite busy over the holidays. Despite this did not notice excessive shedding or scalp symptoms. We referred her to rheum last visit for positive JESSICA and joint pain. Per review of their notes no concern for inflammatory arthropathy at present. Today she has an itchy patch behind the R ear.  - Last seen in-clinic on 10/22/2021  - Shedding or thinning, or both: Stable amount of shedding and thinning  - Current tx: Rogaine, Clobex, Dermasmooth oil  - If using Rogaine, 1 cannister lasts how long: uses after each shampoo, lasts a couple of months  - Scalp or hair care habits/products: every other or every 3rd day, alternating Clobex and H&S, rogaine after each shower, dermasmooth oil 1x weekly  No Any new medications, supplements, or products? (please list below)     Yes- one episode of pain over the scalp, lasted 1 day Scalp pain   No Scalp burning   Yes, behind R ear Scalp itching    No Eyebrow changes    Yes, Thinner in L upper Eyelash changes   No Beard changes    Yes, pubic hair thinning Other body hair changes    Yes, more brittle over last 6 months Nail changes    No Additional symptoms? (please list below)     - Overall course: stable  - COVID status: negative    Patient is otherwise feeling well, in usual state of health, and has no additional skin concerns today.     Labs:  Reviewed. See dermatology problem list.    Physical Exam:  Vitals: There were no vitals taken for  this visit.  GEN: Well developed, well-nourished, in no acute distress, in a pleasant mood.    SKIN: Focused examination of scalp, eyebrows, eyelashes was performed.  - Kim type: I-II  - There is mild diffuse thinning, worst on vertex and ophiasis pattern especially on the temporal scalp though significant regrowth compared to prior photos. There are discrete alopecic patches on the vertex and R occipital scalp with erythema within these patches.  - Mild diffuse erythema  - Mild loose and perifollicular scale  - Decreased density of eyelashes with varying lengths of eyelashes  - Normal eyebrow density with some thinning on the lateral aspects bilaterally  - No nail pitting or dystrophy   - No scalp folliculitis/pustules   - In comparison to prior photographs, overall improved especially in ophiasis region  - No other lesions of concern on areas examined.     Hair Metrix Summary:  - Frontal: increased fibers, some mild loose and perifollicular scale  - Mid: slight decreased density but more terminal fibers  - Vertex: slight increased in hair counts with some more miniaturization  - Occiput: increased fibers, increased scale  - R and L temporal: increased hair count, miniaturization of hairs    Medications:  Current Outpatient Medications   Medication     ALBUTEROL IN     azelastine (ASTELIN) 0.1 % nasal spray     Calcium Carbonate-Vitamin D (CALCIUM + D PO)     cetirizine (ZYRTEC) 10 MG tablet     Cholecalciferol (VITAMIN D) 1000 UNITS capsule     clindamycin (CLEOCIN T) 1 % external lotion     clobetasol (TEMOVATE) 0.05 % external cream     clobetasol (TEMOVATE) 0.05 % ointment     clobetasol propionate (CLOBEX) 0.05 % external shampoo     escitalopram (LEXAPRO) 10 MG tablet     EXEMESTANE PO     famotidine (PEPCID) 10 MG tablet     ferrous gluconate (FERGON) 324 (38 FE) MG tablet     Fluocinolone Acetonide Scalp 0.01 % OIL oil     fluocinonide (LIDEX) 0.05 % external solution     ketoconazole (NIZORAL) 2 %  shampoo     levothyroxine (SYNTHROID, LEVOTHROID) 112 MCG tablet     montelukast (SINGULAIR) 10 MG tablet     omalizumab (XOLAIR) 150 MG injection     tacrolimus (PROTOPIC) 0.1 % ointment     tretinoin (RETIN-A) 0.025 % external cream     triamcinolone acetonide (KENALOG) 10 MG/ML injection     Current Facility-Administered Medications   Medication     betamethasone acet & sod phos (CELESTONE) injection 6 mg     NONFORMULARY     triamcinolone acetonide (KENALOG-10) injection 10 mg     triamcinolone acetonide (KENALOG-10) injection 10 mg     triamcinolone acetonide (KENALOG-10) injection 10 mg     triamcinolone acetonide (KENALOG-10) injection 20 mg     triamcinolone acetonide (KENALOG-10) injection 24 mg     triamcinolone acetonide (KENALOG-10) injection 30 mg      Past Medical History:   Patient Active Problem List   Diagnosis     Alopecia areata     Dermatitis     Hypertrichosis     Urticaria     Autoimmune disease (H)     Dermatitis, seborrheic     Past Medical History:   Diagnosis Date     Asthma      Hypothyroidism      Lobular breast cancer (H)     s/p chemotherapy and radiation, in remission     Multiple allergies        CC Referred Self, MD  No address on file on close of this encounter.

## 2022-01-31 NOTE — PROGRESS NOTES
HairMetrix Summary (1/31/2022)    Frontal anterior    Mid scalp    Vertex    Occipital    Right temporal    Left temporal    Summary

## 2022-01-31 NOTE — LETTER
1/31/2022       RE: Maria C Goodman  [address] [place] [address]     Dear Colleague,    Thank you for referring your patient, Maria C Goodman, to the [facility] [place] at [facility]. Please see a copy of my visit note below.    [facility] Dermatology Note  Encounter Date: Jan 31, 2022  Office Visit     Dermatology Problem List:  # Alopecia areata  - Clobex shampoo, H&S shampoo, Derma-Smoothe oil, minoxidil foam, Allegra 180 mg morning, cetirizine at night  - Prior: lidex soln, clindamycin lotn PRN  - s/p ILK most recently on 4/12/21, 1/12/21, 10/22/2021, 1/31/22  - HairMetrix 9/15/20, 4/12/21, 1/31/22  # Psoriasis, managed by outside derm, well controlled on clobetasol oint prn  # Joint pain/pos JESSICA   - Saw rheum 12/2021. No concern for inflammatory arthritis. Pt plans for second opinion at [facility].  ____________________________________________    Assessment & Plan:     # Alopecia areata  Overall somewhat in proved with ILK. Did have a lapse in home topical regimen with some appreciable perifollicular and loose scale on exam today that we need to get under control. She does have some appreciable eyelash thinning on exam today. We could consider starting Latisse if her eye doctor is okay with this.  - Continue alternating Clobex shampoo and H&S shampoo, continue dermasmoothe oil 1x weekly, continue minoxidil daily  - ILK today, see below  - Discuss Latisse with ophthalmology     # Joint pain/positive JESSICA  Saw rheum 12/2021 for JESSICA positivity and >1hr AM stiffness/diffuse joint pain, no concern for inflammatory arthritis at present per their note. Pt seeking a second opinion at [facility]    Procedures Performed:   - Intra-lesional triamcinolone procedure note. After positioning and cleansing with isopropyl alcohol, 2 total mL of triamcinolone 10 mg/mL was injected into 20 sites on the vertex, R occipital scalp.  The patient tolerated the procedure well and left the dermatology clinic in good condition.    Follow-up: 4 month(s) in-person, or earlier for new or changing lesions    Staff and Resident:     Tana Frederick MD  Dermatology Resident PGY2    Patient was seen and examined with the dermatology resident. I agree with the history, review of systems, physical examination, assessments and plan. ILK injections were done together.    Nita Brown MD  Professor and  Chair  Department of Dermatology  [facility]  ____________________________________________    CC: Hair Loss (Maria C reports no major changes, wants to do hair metrics.)    HPI:  Ms. Maria C Goodman is a 66 year old female who presents as a return patient for follow-up of hair loss, diagnosed as alopecia areata. After her last visit she was not very compliant with her home regimen given she was quite busy over the holidays. Despite this did not notice excessive shedding or scalp symptoms. We referred her to rheum last visit for positive JESSICA and joint pain. Per review of their notes no concern for inflammatory arthropathy at present. Today she has an itchy patch behind the R ear.  - Last seen in-clinic on 10/22/2021  - Shedding or thinning, or both: Stable amount of shedding and thinning  - Current tx: Rogaine, Clobex, Dermasmooth oil  - If using Rogaine, 1 cannister lasts how long: uses after each shampoo, lasts a couple of months  - Scalp or hair care habits/products: every other or every 3rd day, alternating Clobex and H&S, rogaine after each shower, dermasmooth oil 1x weekly  No Any new medications, supplements, or products? (please list below)     Yes- one episode of pain over the scalp, lasted 1 day Scalp pain   No Scalp burning   Yes, behind R ear Scalp itching    No Eyebrow changes    Yes, Thinner in L upper Eyelash changes   No Beard changes    Yes, pubic hair thinning Other body hair changes    Yes, more brittle over last 6 months Nail  changes    No Additional symptoms? (please list below)     - Overall course: stable  - COVID status: negative    Patient is otherwise feeling well, in usual state of health, and has no additional skin concerns today.     Labs:  Reviewed. See dermatology problem list.    Physical Exam:  Vitals: There were no vitals taken for this visit.  GEN: Well developed, well-nourished, in no acute distress, in a pleasant mood.    SKIN: Focused examination of scalp, eyebrows, eyelashes was performed.  - Kim type: I-II  - There is mild diffuse thinning, worst on vertex and ophiasis pattern especially on the temporal scalp though significant regrowth compared to prior photos. There are discrete alopecic patches on the vertex and R occipital scalp with erythema within these patches.  - Mild diffuse erythema  - Mild loose and perifollicular scale  - Decreased density of eyelashes with varying lengths of eyelashes  - Normal eyebrow density with some thinning on the lateral aspects bilaterally  - No nail pitting or dystrophy   - No scalp folliculitis/pustules   - In comparison to prior photographs, overall improved especially in ophiasis region  - No other lesions of concern on areas examined.     Hair Metrix Summary:  - Frontal: increased fibers, some mild loose and perifollicular scale  - Mid: slight decreased density but more terminal fibers  - Vertex: slight increased in hair counts with some more miniaturization  - Occiput: increased fibers, increased scale  - R and L temporal: increased hair count, miniaturization of hairs    Medications:  Current Outpatient Medications   Medication     ALBUTEROL IN     azelastine (ASTELIN) 0.1 % nasal spray     Calcium Carbonate-Vitamin D (CALCIUM + D PO)     cetirizine (ZYRTEC) 10 MG tablet     Cholecalciferol (VITAMIN D) 1000 UNITS capsule     clindamycin (CLEOCIN T) 1 % external lotion     clobetasol (TEMOVATE) 0.05 % external cream     clobetasol (TEMOVATE) 0.05 % ointment      clobetasol propionate (CLOBEX) 0.05 % external shampoo     escitalopram (LEXAPRO) 10 MG tablet     EXEMESTANE PO     famotidine (PEPCID) 10 MG tablet     ferrous gluconate (FERGON) 324 (38 FE) MG tablet     Fluocinolone Acetonide Scalp 0.01 % OIL oil     fluocinonide (LIDEX) 0.05 % external solution     ketoconazole (NIZORAL) 2 % shampoo     levothyroxine (SYNTHROID, LEVOTHROID) 112 MCG tablet     montelukast (SINGULAIR) 10 MG tablet     omalizumab (XOLAIR) 150 MG injection     tacrolimus (PROTOPIC) 0.1 % ointment     tretinoin (RETIN-A) 0.025 % external cream     triamcinolone acetonide (KENALOG) 10 MG/ML injection     Current Facility-Administered Medications   Medication     betamethasone acet & sod phos (CELESTONE) injection 6 mg     NONFORMULARY     triamcinolone acetonide (KENALOG-10) injection 10 mg     triamcinolone acetonide (KENALOG-10) injection 10 mg     triamcinolone acetonide (KENALOG-10) injection 10 mg     triamcinolone acetonide (KENALOG-10) injection 20 mg     triamcinolone acetonide (KENALOG-10) injection 24 mg     triamcinolone acetonide (KENALOG-10) injection 30 mg      Past Medical History:   Patient Active Problem List   Diagnosis     Alopecia areata     Dermatitis     Hypertrichosis     Urticaria     Autoimmune disease (H)     Dermatitis, seborrheic     Past Medical History:   Diagnosis Date     Asthma      Hypothyroidism      Lobular breast cancer (H)     s/p chemotherapy and radiation, in remission     Multiple allergies        CC Referred Self, MD  No address on file on close of this encounter.    HairMetrix Summary (1/31/2022)    Frontal anterior    Mid scalp    Vertex    Occipital    Right temporal    Left temporal    Summary          Again, thank you for allowing me to participate in the care of your patient.      Sincerely,    Nita Brown MD

## 2022-01-31 NOTE — NURSING NOTE
Drug Administration Record    Prior to injection, verified patient identity using patient's name and date of birth.  Due to injection administration, patient instructed to remain in clinic for 15 minutes  afterwards, and to report any adverse reaction to me immediately.    Drug Name: triamcinolone acetonide(kenalog)  Dose: 2mL of triamcinolone 10mg/mL, 20mg dose  Route administered: ID  NDC #: Kenalog-10 (5488-0718-84)  Amount of waste(mL):3mL  Reason for waste: Multi dose vial    LOT #: [id number]  SITE: scalp  : [employer]  EXPIRATION DATE: 04/2023

## 2022-01-31 NOTE — NURSING NOTE
Dermatology Rooming Note    Maria C Goodman's goals for this visit include:   Chief Complaint   Patient presents with     Hair Loss     Maria C reports no major changes, wants to do hair metrics.     Virginie Vick, EMT

## 2022-01-31 NOTE — LETTER
1/31/2022      RE: Maria C Goodman  [address] [place] [address]       [facility] Dermatology Note  Encounter Date: Jan 31, 2022  Office Visit     Dermatology Problem List:  # Alopecia areata  - Clobex shampoo, H&S shampoo, Derma-Smoothe oil, minoxidil foam, Allegra 180 mg morning, cetirizine at night  - Prior: lidex soln, clindamycin lotn PRN  - s/p ILK most recently on 4/12/21, 1/12/21, 10/22/2021, 1/31/22  - HairMetrix 9/15/20, 4/12/21, 1/31/22  # Psoriasis, managed by outside derm, well controlled on clobetasol oint prn  # Joint pain/pos JESSICA   - Saw rheum 12/2021. No concern for inflammatory arthritis. Pt plans for second opinion at [facility].  ____________________________________________    Assessment & Plan:     # Alopecia areata  Overall somewhat in proved with ILK. Did have a lapse in home topical regimen with some appreciable perifollicular and loose scale on exam today that we need to get under control. She does have some appreciable eyelash thinning on exam today. We could consider starting Latisse if her eye doctor is okay with this.  - Continue alternating Clobex shampoo and H&S shampoo, continue dermasmoothe oil 1x weekly, continue minoxidil daily  - ILK today, see below  - Discuss Latisse with ophthalmology     # Joint pain/positive JESSICA  Saw rheum 12/2021 for JESSICA positivity and >1hr AM stiffness/diffuse joint pain, no concern for inflammatory arthritis at present per their note. Pt seeking a second opinion at [facility]    Procedures Performed:   - Intra-lesional triamcinolone procedure note. After positioning and cleansing with isopropyl alcohol, 2 total mL of triamcinolone 10 mg/mL was injected into 20 sites on the vertex, R occipital scalp. The patient tolerated the procedure well and left the dermatology clinic in good condition.    Follow-up: 4 month(s) in-person, or earlier for new or changing lesions    Staff and Resident:     Tana Frederick MD  Dermatology  Resident PGY2    Patient was seen and examined with the dermatology resident. I agree with the history, review of systems, physical examination, assessments and plan. ILK injections were done together.    Nita Brown MD  Professor and  Chair  Department of Dermatology  [facility]  ____________________________________________    CC: Hair Loss (Maria C reports no major changes, wants to do hair metrics.)    HPI:  Ms. Maria C Goodman is a 66 year old female who presents as a return patient for follow-up of hair loss, diagnosed as alopecia areata. After her last visit she was not very compliant with her home regimen given she was quite busy over the holidays. Despite this did not notice excessive shedding or scalp symptoms. We referred her to rheum last visit for positive JESSICA and joint pain. Per review of their notes no concern for inflammatory arthropathy at present. Today she has an itchy patch behind the R ear.  - Last seen in-clinic on 10/22/2021  - Shedding or thinning, or both: Stable amount of shedding and thinning  - Current tx: Rogaine, Clobex, Dermasmooth oil  - If using Rogaine, 1 cannister lasts how long: uses after each shampoo, lasts a couple of months  - Scalp or hair care habits/products: every other or every 3rd day, alternating Clobex and H&S, rogaine after each shower, dermasmooth oil 1x weekly  No Any new medications, supplements, or products? (please list below)     Yes- one episode of pain over the scalp, lasted 1 day Scalp pain   No Scalp burning   Yes, behind R ear Scalp itching    No Eyebrow changes    Yes, Thinner in L upper Eyelash changes   No Beard changes    Yes, pubic hair thinning Other body hair changes    Yes, more brittle over last 6 months Nail changes    No Additional symptoms? (please list below)     - Overall course: stable  - COVID status: negative    Patient is otherwise feeling well, in usual state of health, and has no additional skin concerns today.      Labs:  Reviewed. See dermatology problem list.    Physical Exam:  Vitals: There were no vitals taken for this visit.  GEN: Well developed, well-nourished, in no acute distress, in a pleasant mood.    SKIN: Focused examination of scalp, eyebrows, eyelashes was performed.  - Kim type: I-II  - There is mild diffuse thinning, worst on vertex and ophiasis pattern especially on the temporal scalp though significant regrowth compared to prior photos. There are discrete alopecic patches on the vertex and R occipital scalp with erythema within these patches.  - Mild diffuse erythema  - Mild loose and perifollicular scale  - Decreased density of eyelashes with varying lengths of eyelashes  - Normal eyebrow density with some thinning on the lateral aspects bilaterally  - No nail pitting or dystrophy   - No scalp folliculitis/pustules   - In comparison to prior photographs, overall improved especially in ophiasis region  - No other lesions of concern on areas examined.     Hair Metrix Summary:  - Frontal: increased fibers, some mild loose and perifollicular scale  - Mid: slight decreased density but more terminal fibers  - Vertex: slight increased in hair counts with some more miniaturization  - Occiput: increased fibers, increased scale  - R and L temporal: increased hair count, miniaturization of hairs    Medications:  Current Outpatient Medications   Medication     ALBUTEROL IN     azelastine (ASTELIN) 0.1 % nasal spray     Calcium Carbonate-Vitamin D (CALCIUM + D PO)     cetirizine (ZYRTEC) 10 MG tablet     Cholecalciferol (VITAMIN D) 1000 UNITS capsule     clindamycin (CLEOCIN T) 1 % external lotion     clobetasol (TEMOVATE) 0.05 % external cream     clobetasol (TEMOVATE) 0.05 % ointment     clobetasol propionate (CLOBEX) 0.05 % external shampoo     escitalopram (LEXAPRO) 10 MG tablet     EXEMESTANE PO     famotidine (PEPCID) 10 MG tablet     ferrous gluconate (FERGON) 324 (38 FE) MG tablet     Fluocinolone  Acetonide Scalp 0.01 % OIL oil     fluocinonide (LIDEX) 0.05 % external solution     ketoconazole (NIZORAL) 2 % shampoo     levothyroxine (SYNTHROID, LEVOTHROID) 112 MCG tablet     montelukast (SINGULAIR) 10 MG tablet     omalizumab (XOLAIR) 150 MG injection     tacrolimus (PROTOPIC) 0.1 % ointment     tretinoin (RETIN-A) 0.025 % external cream     triamcinolone acetonide (KENALOG) 10 MG/ML injection     Current Facility-Administered Medications   Medication     betamethasone acet & sod phos (CELESTONE) injection 6 mg     NONFORMULARY     triamcinolone acetonide (KENALOG-10) injection 10 mg     triamcinolone acetonide (KENALOG-10) injection 10 mg     triamcinolone acetonide (KENALOG-10) injection 10 mg     triamcinolone acetonide (KENALOG-10) injection 20 mg     triamcinolone acetonide (KENALOG-10) injection 24 mg     triamcinolone acetonide (KENALOG-10) injection 30 mg      Past Medical History:   Patient Active Problem List   Diagnosis     Alopecia areata     Dermatitis     Hypertrichosis     Urticaria     Autoimmune disease (H)     Dermatitis, seborrheic     Past Medical History:   Diagnosis Date     Asthma      Hypothyroidism      Lobular breast cancer (H)     s/p chemotherapy and radiation, in remission     Multiple allergies        CC Referred Self, MD  No address on file on close of this encounter.    HairMetrix Summary (1/31/2022)    Frontal anterior    Mid scalp    Vertex    Occipital    Right temporal    Left temporal    Summary          Nita Brown MD

## 2022-06-13 ENCOUNTER — OFFICE VISIT (OUTPATIENT)
Dept: DERMATOLOGY | Facility: CLINIC | Age: 67
End: 2022-06-13
Payer: COMMERCIAL

## 2022-06-13 VITALS — DIASTOLIC BLOOD PRESSURE: 73 MMHG | SYSTOLIC BLOOD PRESSURE: 120 MMHG

## 2022-06-13 DIAGNOSIS — M35.9 AUTOIMMUNE DISEASE (H): ICD-10-CM

## 2022-06-13 DIAGNOSIS — L40.9 PSORIASIS: ICD-10-CM

## 2022-06-13 DIAGNOSIS — L63.9 ALOPECIA AREATA: Primary | ICD-10-CM

## 2022-06-13 DIAGNOSIS — L21.9 DERMATITIS, SEBORRHEIC: ICD-10-CM

## 2022-06-13 PROCEDURE — 11901 INJECT SKIN LESIONS >7: CPT | Mod: GC | Performed by: DERMATOLOGY

## 2022-06-13 PROCEDURE — 99213 OFFICE O/P EST LOW 20 MIN: CPT | Mod: 25 | Performed by: DERMATOLOGY

## 2022-06-13 RX ORDER — FLUOCINOLONE ACETONIDE 0.11 MG/ML
OIL TOPICAL
Qty: 118.28 ML | Refills: 5 | Status: SHIPPED | OUTPATIENT
Start: 2022-06-13 | End: 2023-04-11

## 2022-06-13 RX ORDER — CLOBETASOL PROPIONATE 0.05 G/100ML
SHAMPOO TOPICAL
Qty: 118 ML | Refills: 11 | Status: SHIPPED | OUTPATIENT
Start: 2022-06-13 | End: 2023-04-11

## 2022-06-13 RX ORDER — CLOBETASOL PROPIONATE 0.5 MG/G
OINTMENT TOPICAL
Qty: 60 G | Refills: 5 | Status: SHIPPED | OUTPATIENT
Start: 2022-06-13 | End: 2024-05-23

## 2022-06-13 ASSESSMENT — PAIN SCALES - GENERAL: PAINLEVEL: NO PAIN (0)

## 2022-06-13 NOTE — NURSING NOTE
Dermatology Rooming Note    Maria C Goodman's goals for this visit include:   Chief Complaint   Patient presents with     Hair Loss     Dayanna Arshad, Visit Facilitator

## 2022-06-13 NOTE — PROGRESS NOTES
[facility] Dermatology Note  Encounter Date: Jun 13, 2022  Office Visit     Dermatology Problem List:  # Alopecia areata  - Clobex shampoo, H&S shampoo, fluocinolone oil, Rogaine foam, Allegra 180 mg morning, cetirizine at night  - Prior: lidex soln, clindamycin lotn PRN  - s/p ILK most recently on 4/12/21, 1/12/21, 10/22/2021, 1/31/22, 6/13/2022  - HairMetrix 9/15/20, 4/12/21, 1/31/22, 6/13/2022  # Psoriasis, managed by [facility] dermatologist, Dr. Joshi, well controlled on clobetasol oint prn  - Clobetasol ointment   # Joint pain/pos JESSICA   - rheum 12/2021. No concern for inflammatory arthritis, will follow-up at [facility] 11/2022  ____________________________________________    Assessment & Plan:     # Alopecia areata  Overall somewhat in proved with ILK. Did have a lapse in home topical regimen with some appreciable perifollicular and loose scale on exam today that we need to get under control. She does have some appreciable eyelash thinning on exam today. We could consider starting Latisse if her eye doctor is okay with this.  - Continue alternating Clobex shampoo and H&S shampoo, continue dermasmoothe oil 1x weekly, continue Rogaine daily  - ILK today, see below  - Discuss Latisse with ophthalmology     # Psoriasis, extremities  Discussed etiology and moisturizers for dry skin  - Continue Cetaphil cream  - Follow up with your [facility] dermatologist  - Clobetasol ointment refill provided.       Procedures Performed:   - Intra-lesional triamcinolone procedure note. After positioning and cleansing with isopropyl alcohol, 2 total mL of triamcinolone 10 mg/mL was injected into 20 sites on the vertex, R occipital scalp. The patient tolerated the procedure well and left the dermatology clinic in good condition.    Hair Metrix:stability, general scalp health stable   - Frontal Scalp: improved hair fiber count, loose scale and perifollicular scale   - Mid-Scalp: improved hair fiber count, decreased diffuse  erythema, small increase in perifollicular scale   - Vertex Scalp: stable to slightly decreased hair fiber count, more diffuse erythema   - Occipital Scalp: stable hair fiber count, increase in perifollicular scale   - Right Temporal Scalp: different measurements so looking at scalp health, patchy erythema, minor increase in scale   - Left Temporal Scalp: different measurement so looking at scalp health, patchy erythema, minor increase in scale    Follow-up: 4 month(s) in-person, or earlier for new or changing lesions    Staff and Scribe:     Scribe Disclosure:  I, Che Dunham, am serving as a scribe to document services personally performed by Nita Brown MD based on data collection and the provider's statements to me.     Christine Tee MD  Dermatology Resident  [facility]      Provider Disclosure:   The documentation recorded by the scribe accurately reflects the services I personally performed and the decisions made by me.    Patient was seen and examined with the dermatology resident. I agree with the history, review of systems, physical examination, assessments and plan. ILK injections were done together.     Nita Brown MD  Professor and  Chair  Department of Dermatology  [facility]    ____________________________________________    CC: Hair Loss    HPI:  Ms. Maria C Goodman is a 67 year old female who presents as a return patient for follow-up of hair loss, diagnosed as alopecia areata.   - Last seen in-clinic on 1/31/22  - Shedding or thinning, or both: stable amount of shedding and thinning  - Current tx: Clobex shampoo 3-4x weekly, H&S shampoo, Derma-Smoothe oil without consistent use, Rogaine foam, Allegra 180 mg morning, cetirizine at night  - If using Rogaine, 1 cannister lasts how long: uses after each shampoo, lasts a couple of months   - Scalp or hair care habits/products: shampoos every other day, alternating Clobex and H&S, Rogaine after each shower,  dermasmooth oil weekly    No Any new medications, supplements, or products? (please list below)     No Scalp pain   No Scalp burning   Yes- where a headband would sit Scalp itching    No Eyebrow changes    No Eyelash changes   No Beard changes    Yes- new recurrent psoriasis  Other body hair changes    No Nail changes    No Additional symptoms? (please list below)     - Overall course: Notes things are going well however feels there has been a plateau. No hair loss.    Patient is otherwise feeling well, in usual state of health, and has no additional skin concerns today.     ROS: As per HPI    Labs:  None reviewed.    Physical Exam:  Vitals: /73 (BP Location: Left arm)   GEN: Well developed, well-nourished, in no acute distress, in a pleasant mood.    SKIN: Focused examination of the scalp, eyebrows, eyelashes was performed.  - Kim type: I-II  - There is mild diffuse thinning, worse on vertex and ophiasis pattern especially on the temporal scalp though significant regrowth compared to prior photos. There are discrete alopecic patches on the vertex and right occipital scalp with erythema within these patches.   - Mild diffuse erythema   - Mild loose and perifollicular erythema   - Decreased density of eyelashes with varying lengths of eyelashes  - Negative hair pull test   - Normal eyebrow density with some thinning on the lateral aspects bilaterally  - No nail pitting or dystrophy   - No scalp folliculitis/pustules   - In comparison to prior photographs, overall improved especially in the ophiasis region.  - No other lesions of concern on areas examined.     Medications:  Current Outpatient Medications   Medication     ALBUTEROL IN     azelastine (ASTELIN) 0.1 % nasal spray     Calcium Carbonate-Vitamin D (CALCIUM + D PO)     cetirizine (ZYRTEC) 10 MG tablet     clobetasol (TEMOVATE) 0.05 % external cream     clobetasol (TEMOVATE) 0.05 % external ointment     clobetasol propionate (CLOBEX) 0.05 %  external shampoo     escitalopram (LEXAPRO) 10 MG tablet     EXEMESTANE PO     famotidine (PEPCID) 10 MG tablet     ferrous gluconate (FERGON) 324 (38 FE) MG tablet     Fluocinolone Acetonide Scalp 0.01 % OIL oil     ketoconazole (NIZORAL) 2 % shampoo     levothyroxine (SYNTHROID, LEVOTHROID) 112 MCG tablet     Cholecalciferol (VITAMIN D) 1000 UNITS capsule     clindamycin (CLEOCIN T) 1 % external lotion     fluocinonide (LIDEX) 0.05 % external solution     montelukast (SINGULAIR) 10 MG tablet     omalizumab (XOLAIR) 150 MG injection     tacrolimus (PROTOPIC) 0.1 % ointment     tretinoin (RETIN-A) 0.025 % external cream     triamcinolone acetonide (KENALOG) 10 MG/ML injection     Current Facility-Administered Medications   Medication     betamethasone acet & sod phos (CELESTONE) injection 6 mg     NONFORMULARY     triamcinolone acetonide (KENALOG-10) injection 10 mg     triamcinolone acetonide (KENALOG-10) injection 10 mg     triamcinolone acetonide (KENALOG-10) injection 10 mg     triamcinolone acetonide (KENALOG-10) injection 20 mg     triamcinolone acetonide (KENALOG-10) injection 20 mg     triamcinolone acetonide (KENALOG-10) injection 24 mg     triamcinolone acetonide (KENALOG-10) injection 30 mg      Past Medical History:   Patient Active Problem List   Diagnosis     Alopecia areata     Dermatitis     Hypertrichosis     Urticaria     Autoimmune disease (H)     Dermatitis, seborrheic     Past Medical History:   Diagnosis Date     Asthma      Hypothyroidism      Lobular breast cancer (H)     s/p chemotherapy and radiation, in remission     Multiple allergies        CC Damaris Mckeon  [facility]  [address] [place] [address] on close of this encounter.

## 2022-06-13 NOTE — LETTER
6/13/2022       RE: Maria C Goodman  [address] [place] [address]     Dear Colleague,    Thank you for referring your patient, Maria C Goodman, to the [facility] [place] at [facility]. Please see a copy of my visit note below.    [facility] Dermatology Note  Encounter Date: Jun 13, 2022  Office Visit     Dermatology Problem List:  # Alopecia areata  - Clobex shampoo, H&S shampoo, fluocinolone oil, Rogaine foam, Allegra 180 mg morning, cetirizine at night  - Prior: lidex soln, clindamycin lotn PRN  - s/p ILK most recently on 4/12/21, 1/12/21, 10/22/2021, 1/31/22, 6/13/2022  - HairMetrix 9/15/20, 4/12/21, 1/31/22, 6/13/2022  # Psoriasis, managed by [facility], Dr. Joshi, well controlled on clobetasol oint prn  - Clobetasol ointment   # Joint pain/pos JESSICA   - rheum 12/2021. No concern for inflammatory arthritis, will follow-up at [facility] 11/2022  ____________________________________________    Assessment & Plan:     # Alopecia areata  Overall somewhat in proved with ILK. Did have a lapse in home topical regimen with some appreciable perifollicular and loose scale on exam today that we need to get under control. She does have some appreciable eyelash thinning on exam today. We could consider starting Latisse if her eye doctor is okay with this.  - Continue alternating Clobex shampoo and H&S shampoo, continue dermasmoothe oil 1x weekly, continue Rogaine daily  - ILK today, see below  - Discuss Latisse with ophthalmology     # Psoriasis, extremities  Discussed etiology and moisturizers for dry skin  - Continue Cetaphil cream  - Follow up with your [facility] dermatologist  - Clobetasol ointment refill provided.       Procedures Performed:   - Intra-lesional triamcinolone procedure note. After positioning and cleansing with isopropyl alcohol, 2 total mL of triamcinolone 10 mg/mL was injected into 20 sites on  the vertex, R occipital scalp. The patient tolerated the procedure well and left the dermatology clinic in good condition.    Hair Metrix:stability, general scalp health stable   - Frontal Scalp: improved hair fiber count, loose scale and perifollicular scale   - Mid-Scalp: improved hair fiber count, decreased diffuse erythema, small increase in perifollicular scale   - Vertex Scalp: stable to slightly decreased hair fiber count, more diffuse erythema   - Occipital Scalp: stable hair fiber count, increase in perifollicular scale   - Right Temporal Scalp: different measurements so looking at scalp health, patchy erythema, minor increase in scale   - Left Temporal Scalp: different measurement so looking at scalp health, patchy erythema, minor increase in scale    Follow-up: 4 month(s) in-person, or earlier for new or changing lesions    Staff and Scribe:     Scribe Disclosure:  I, Che Dunham, am serving as a scribe to document services personally performed by Nita Brown MD based on data collection and the provider's statements to me.     Christine Tee MD  Dermatology Resident  [facility]      Provider Disclosure:   The documentation recorded by the scribe accurately reflects the services I personally performed and the decisions made by me.    Patient was seen and examined with the dermatology resident. I agree with the history, review of systems, physical examination, assessments and plan. ILK injections were done together.     Nita Brown MD  Professor and  Chair  Department of Dermatology  [facility]    ____________________________________________    CC: Hair Loss    HPI:  Ms. Maria C Goodman is a 67 year old female who presents as a return patient for follow-up of hair loss, diagnosed as alopecia areata.   - Last seen in-clinic on 1/31/22  - Shedding or thinning, or both: stable amount of shedding and thinning  - Current tx: Clobex shampoo 3-4x weekly, H&S shampoo,  Derma-Smoothe oil without consistent use, Rogaine foam, Allegra 180 mg morning, cetirizine at night  - If using Rogaine, 1 cannister lasts how long: uses after each shampoo, lasts a couple of months   - Scalp or hair care habits/products: shampoos every other day, alternating Clobex and H&S, Rogaine after each shower, dermasmooth oil weekly    No Any new medications, supplements, or products? (please list below)     No Scalp pain   No Scalp burning   Yes- where a headband would sit Scalp itching    No Eyebrow changes    No Eyelash changes   No Beard changes    Yes- new recurrent psoriasis  Other body hair changes    No Nail changes    No Additional symptoms? (please list below)     - Overall course: Notes things are going well however feels there has been a plateau. No hair loss.    Patient is otherwise feeling well, in usual state of health, and has no additional skin concerns today.     ROS: As per HPI    Labs:  None reviewed.    Physical Exam:  Vitals: /73 (BP Location: Left arm)   GEN: Well developed, well-nourished, in no acute distress, in a pleasant mood.    SKIN: Focused examination of the scalp, eyebrows, eyelashes was performed.  - Kim type: I-II  - There is mild diffuse thinning, worse on vertex and ophiasis pattern especially on the temporal scalp though significant regrowth compared to prior photos. There are discrete alopecic patches on the vertex and right occipital scalp with erythema within these patches.   - Mild diffuse erythema   - Mild loose and perifollicular erythema   - Decreased density of eyelashes with varying lengths of eyelashes  - Negative hair pull test   - Normal eyebrow density with some thinning on the lateral aspects bilaterally  - No nail pitting or dystrophy   - No scalp folliculitis/pustules   - In comparison to prior photographs, overall improved especially in the ophiasis region.  - No other lesions of concern on areas examined.     Medications:  Current  Outpatient Medications   Medication     ALBUTEROL IN     azelastine (ASTELIN) 0.1 % nasal spray     Calcium Carbonate-Vitamin D (CALCIUM + D PO)     cetirizine (ZYRTEC) 10 MG tablet     clobetasol (TEMOVATE) 0.05 % external cream     clobetasol (TEMOVATE) 0.05 % external ointment     clobetasol propionate (CLOBEX) 0.05 % external shampoo     escitalopram (LEXAPRO) 10 MG tablet     EXEMESTANE PO     famotidine (PEPCID) 10 MG tablet     ferrous gluconate (FERGON) 324 (38 FE) MG tablet     Fluocinolone Acetonide Scalp 0.01 % OIL oil     ketoconazole (NIZORAL) 2 % shampoo     levothyroxine (SYNTHROID, LEVOTHROID) 112 MCG tablet     Cholecalciferol (VITAMIN D) 1000 UNITS capsule     clindamycin (CLEOCIN T) 1 % external lotion     fluocinonide (LIDEX) 0.05 % external solution     montelukast (SINGULAIR) 10 MG tablet     omalizumab (XOLAIR) 150 MG injection     tacrolimus (PROTOPIC) 0.1 % ointment     tretinoin (RETIN-A) 0.025 % external cream     triamcinolone acetonide (KENALOG) 10 MG/ML injection     Current Facility-Administered Medications   Medication     betamethasone acet & sod phos (CELESTONE) injection 6 mg     NONFORMULARY     triamcinolone acetonide (KENALOG-10) injection 10 mg     triamcinolone acetonide (KENALOG-10) injection 10 mg     triamcinolone acetonide (KENALOG-10) injection 10 mg     triamcinolone acetonide (KENALOG-10) injection 20 mg     triamcinolone acetonide (KENALOG-10) injection 20 mg     triamcinolone acetonide (KENALOG-10) injection 24 mg     triamcinolone acetonide (KENALOG-10) injection 30 mg      Past Medical History:   Patient Active Problem List   Diagnosis     Alopecia areata     Dermatitis     Hypertrichosis     Urticaria     Autoimmune disease (H)     Dermatitis, seborrheic     Past Medical History:   Diagnosis Date     Asthma      Hypothyroidism      Lobular breast cancer (H)     s/p chemotherapy and radiation, in remission     Multiple allergies        [facility]  [facility]  [address] [place] [address] on close of this encounter.    Drug Administration Record    Prior to injection, verified patient identity using patient's name and date of birth.  Due to injection administration, patient instructed to remain in clinic for 15 minutes  afterwards, and to report any adverse reaction to me immediately.    Drug Name: triamcinolone acetonide(kenalog)  Dose: 2mL of triamcinolone 10mg/mL, 20mg dose  Route administered: ID  NDC #: Kenalog-10 (3555-7457-93)  Amount of waste(mL):3ml  Reason for waste: Single use vial    LOT #: [id number]  SITE: see note  : [employer]  EXPIRATION DATE: 10/2023      Again, thank you for allowing me to participate in the care of your patient.      Sincerely,    Nita Brown MD

## 2022-06-14 NOTE — PROGRESS NOTES
Drug Administration Record    Prior to injection, verified patient identity using patient's name and date of birth.  Due to injection administration, patient instructed to remain in clinic for 15 minutes  afterwards, and to report any adverse reaction to me immediately.    Drug Name: triamcinolone acetonide(kenalog)  Dose: 2mL of triamcinolone 10mg/mL, 20mg dose  Route administered: ID  NDC #: Kenalog-10 (6814-7003-05)  Amount of waste(mL):3ml  Reason for waste: Single use vial    LOT #: [id number]  SITE: see note  : [employer]  EXPIRATION DATE: 10/2023   17.5

## 2022-07-02 ENCOUNTER — HEALTH MAINTENANCE LETTER (OUTPATIENT)
Age: 67
End: 2022-07-02

## 2022-07-18 NOTE — PROGRESS NOTES
HairMetrix Summary (6/13/2022)    Frontal anterior    Mid scalp    Vertex    Occipital    Right temporal    Left temporal    Summary

## 2022-12-05 ENCOUNTER — OFFICE VISIT (OUTPATIENT)
Dept: DERMATOLOGY | Facility: CLINIC | Age: 67
End: 2022-12-05
Payer: COMMERCIAL

## 2022-12-05 DIAGNOSIS — L40.9 PSORIASIS: ICD-10-CM

## 2022-12-05 DIAGNOSIS — L63.9 ALOPECIA AREATA: Primary | ICD-10-CM

## 2022-12-05 PROCEDURE — 99207 PR NO BILLABLE SERVICE THIS VISIT: CPT | Performed by: DERMATOLOGY

## 2022-12-05 PROCEDURE — 99213 OFFICE O/P EST LOW 20 MIN: CPT | Mod: 25 | Performed by: DERMATOLOGY

## 2022-12-05 PROCEDURE — 11901 INJECT SKIN LESIONS >7: CPT | Mod: GC | Performed by: DERMATOLOGY

## 2022-12-05 ASSESSMENT — PAIN SCALES - GENERAL: PAINLEVEL: NO PAIN (0)

## 2022-12-05 NOTE — NURSING NOTE
Dermatology Rooming Note    Maria C Goodman's goals for this visit include:   Chief Complaint   Patient presents with     Hair Loss     Maria C is here for a HL follow-up. States condition has slightly worsened since last seen.     Loc Preston, EMT

## 2022-12-05 NOTE — NURSING NOTE
Drug Administration Record    Prior to injection, verified patient identity using patient's name and date of birth.  Due to injection administration, patient instructed to remain in clinic for 15 minutes  afterwards, and to report any adverse reaction to me immediately.    Drug Name: triamcinolone acetonide(kenalog)  Dose: 2.0mL of triamcinolone 10mg/mL, 20mg dose  Route administered: ID  NDC #: Kenalog-10 (2398-8419-22)  Amount of waste(mL):3.0mL  Reason for waste: Multi dose vial    LOT #: [id number]  SITE: See Provider Note  : [employer]  EXPIRATION DATE: JUN2024

## 2022-12-05 NOTE — PROGRESS NOTES
[facility] Dermatology Note  Encounter Date: Dec 5, 2022  Office Visit     Dermatology Problem List:   1. Alopecia areata  - Current tx:  ILK, Clobex shampoo (1-2x a week), H&S shampoo, fluocinolone oil (less since June 2x a month), Rogaine foam (1x a week), Allegra 180 mg morning, cetirizine at night  - Prior: Lidex soln, clindamycin lotn PRN  - s/p ILK most recently on 4/12/21, 1/12/21, 10/22/2021, 1/31/22, 6/13/2022, 12/5/2022  - HairMetrix 9/15/20, 4/12/21, 1/31/22, 6/13/2022  2. Psoriasis, managed by [facility], Dr. Joshi  - Clobetasol ointment   3. Joint pain/pos JESSICA   - rheum 12/2021. No concern for inflammatory arthritis, will follow-up at [facility] 11/2022  ____________________________________________    Assessment & Plan:     # Alopecia areata. Overall somewhat in proved with ILK.   - Continue alternating Clobex shampoo and H&S shampoo, alternate with the Clobex 2-3x weekly   - Continue dermasmoothe oil 1x weekly    - Continue Rogaine at least 5x weekly   - ILK today, see procedure below    # Psoriasis, extremities  - Continue Cetaphil cream  - Follow up with her [facility] dermatologist  - Continue clobetasol ointment prn     Procedures Performed:   - Intra-lesional triamcinolone procedure note. After positioning and cleansing with isopropyl alcohol, 2 total mL of triamcinolone 10 mg/mL was injected into 20 sites on the ophiasis region of the scalp. The patient tolerated the procedure well and left the dermatology clinic in good condition.    Follow-up: As scheduled on 4/3/2023     Staff and Scribe:      Scribe Disclosure:  MAKENZIE EWING, am serving as a scribe to document services personally performed by Nita Brown MD based on data collection and the provider's statements to me.     Tesha Jimenez MD  Dermatology Resident, PGY-2    Provider Disclosure:   The documentation recorded by the scribe accurately reflects the services I personally performed and the  decisions made by me.    Patient was seen and examined with the dermatology resident. I agree with the history, review of systems, physical examination, assessments and plan.    Nita Brown MD  Professor and  Chair  Department of Dermatology  [facility]    ____________________________________________    CC: Hair Loss (Maria C is here for a HL follow-up. States condition has slightly worsened since last seen.)    HPI:  Ms. Maria C Goodman is a 67 year old female who presents as a return patient for follow-up of hair loss, diagnosed as alopecia areata. Patient reports a flare of her psoriasis on the back of her arms and calves, but her alopecia areata is improving.   - Last seen in-clinic on 6/13/22  - Shedding or thinning, or both: thinning, no increased shedding   - Current tx: ILK, Clobex shampoo (1-2x a week), H&S shampoo, fluocinolone oil (less since June 2x a month), Rogaine foam (1x a week), Allegra 180 mg morning, cetirizine at night  - If using Rogaine, 1 cannister lasts how long: couple months   - Scalp or hair care habits/products: post shampoo conditioner, no irritation associated with the new conditioner     No Any new medications, supplements, or products? (please list below)  -Fe unsure dose MWF      No Scalp pain   No Scalp burning   Yes Scalp itching - occasional center part to the left    Yes Eyebrow changes - thinning center    No Eyelash changes - since Alopecia no changes    No Beard changes    No Other body hair changes    No Nail changes - consistently brittle since Alopecia   No Additional symptoms? (please list below)     - Overall course: Stable with a little bit of thinning.   - COVID status: No     Patient is otherwise feeling well, in usual state of health, and has no additional skin concerns today.     ROS: Negative     Labs:  None reviewed.    Physical Exam:  Vitals: There were no vitals taken for this visit.  GEN: Well developed, well-nourished, in no acute distress, in a  pleasant mood.    SKIN: Focused examination of the scalp and face was performed.  - Kim type: I-II  - Mild diffuse erythema   - Negative hair pull test   - New fibers coming in on the left eyelid.   - Fine eyebrows fibers coming out laterally.   - no nail pitting or dystrophy   - no scalp folliculitis/pustules   - In comparison to prior photographs, greatly improved hair growth on the scalp.   - No other lesions of concern on areas examined.     Medications:  Current Outpatient Medications   Medication     ALBUTEROL IN     azelastine (ASTELIN) 0.1 % nasal spray     Calcium Carbonate-Vitamin D (CALCIUM + D PO)     cetirizine (ZYRTEC) 10 MG tablet     clobetasol (TEMOVATE) 0.05 % external cream     clobetasol (TEMOVATE) 0.05 % external ointment     escitalopram (LEXAPRO) 10 MG tablet     EXEMESTANE PO     famotidine (PEPCID) 10 MG tablet     ferrous gluconate (FERGON) 324 (38 FE) MG tablet     Fluocinolone Acetonide Scalp 0.01 % OIL oil     ketoconazole (NIZORAL) 2 % shampoo     levothyroxine (SYNTHROID, LEVOTHROID) 112 MCG tablet     VITAMIN D PO     Cholecalciferol (VITAMIN D) 1000 UNITS capsule     clindamycin (CLEOCIN T) 1 % external lotion     clobetasol (TEMOVATE) 0.05 % external ointment     clobetasol propionate (CLOBEX) 0.05 % external shampoo     montelukast (SINGULAIR) 10 MG tablet     omalizumab (XOLAIR) 150 MG injection     tacrolimus (PROTOPIC) 0.1 % ointment     tretinoin (RETIN-A) 0.025 % external cream     triamcinolone acetonide (KENALOG) 10 MG/ML injection     Current Facility-Administered Medications   Medication     betamethasone acet & sod phos (CELESTONE) injection 6 mg     NONFORMULARY     triamcinolone acetonide (KENALOG-10) injection 10 mg     triamcinolone acetonide (KENALOG-10) injection 10 mg     triamcinolone acetonide (KENALOG-10) injection 10 mg     triamcinolone acetonide (KENALOG-10) injection 10 mg     triamcinolone acetonide (KENALOG-10) injection 20 mg     triamcinolone  acetonide (KENALOG-10) injection 20 mg     triamcinolone acetonide (KENALOG-10) injection 24 mg     triamcinolone acetonide (KENALOG-10) injection 30 mg      Past Medical History:   Patient Active Problem List   Diagnosis     Alopecia areata     Dermatitis     Hypertrichosis     Urticaria     Autoimmune disease (H)     Dermatitis, seborrheic     Past Medical History:   Diagnosis Date     Asthma      Hypothyroidism      Lobular breast cancer (H)     s/p chemotherapy and radiation, in remission     Multiple allergies        CC Barrett Mccarty MD  [address] [place] [phone] on close of this encounter.

## 2022-12-05 NOTE — LETTER
12/5/2022       RE: Maria C Goodman  [address] [place] [address]     Dear Colleague,    Thank you for referring your patient, Maria C Goodman, to the [facility] [place] at [facility]. Please see a copy of my visit note below.    [facility] Dermatology Note  Encounter Date: Dec 5, 2022  Office Visit     Dermatology Problem List:   1. Alopecia areata  - Clobex shampoo, H&S shampoo, fluocinolone oil, Rogaine foam, Allegra 180 mg morning, cetirizine at night  - Prior: lidex soln, clindamycin lotn PRN  - s/p ILK most recently on 4/12/21, 1/12/21, 10/22/2021, 1/31/22, 6/13/2022  - HairMetrix 9/15/20, 4/12/21, 1/31/22, 6/13/2022  2. Psoriasis, managed by [facility] dermatologist, Dr. Joshi, well controlled on clobetasol oint prn  - Clobetasol ointment   3. Joint pain/pos JESSICA   - rheum 12/2021. No concern for inflammatory arthritis, will follow-up at [facility] 11/2022  ____________________________________________    Assessment & Plan:     # Alopecia areata  Overall somewhat in proved with ILK. Did have a lapse in home topical regimen with some appreciable perifollicular and loose scale on exam today that we need to get under control. She does have some appreciable eyelash thinning on exam today. We could consider starting Latisse if her eye doctor is okay with this.  - Continue alternating Clobex shampoo and H&S shampoo, continue dermasmoothe oil 1x weekly, continue Rogaine daily  - ILK today, see below  - Discuss Latisse with ophthalmology      # Psoriasis, extremities  Discussed etiology and moisturizers for dry skin  - Continue Cetaphil cream  - Follow up with your [facility] dermatologist  - Clobetasol ointment refill provided.    Procedures Performed:   {kkprocedurenotes:310779}    Follow-up: {kkfollowup:733958}    Staff and Scribe:      Scribe Disclosure:  I, MAKENZIE KO, am serving as a scribe to document services  personally performed by Nita Brown MD based on data collection and the provider's statements to me.     ***  ____________________________________________    CC: No chief complaint on file.    HPI:  Ms. Maria C Goodman is a 67 year old female who presents as a return patient for follow-up of hair loss, diagnosed as alopecia areata.   - Last seen in-clinic on 6/13/22  - Shedding or thinning, or both: ***  - Current tx: ILK, Clobex shampoo, H&S shampoo, fluocinolone oil, Rogaine foam, Allegra 180 mg morning, cetirizine at night  - If using Rogaine, 1 cannister lasts how long: ***   - Scalp or hair care habits/products: ***  ***Yes/No Any new medications, supplements, or products? (please list below)     ***Yes/No Scalp pain   ***Yes/No Scalp burning   ***Yes/No Scalp itching    ***Yes/No Eyebrow changes    ***Yes/No Eyelash changes   ***Yes/No Beard changes    ***Yes/No Other body hair changes    ***Yes/No Nail changes    ***Yes/No Additional symptoms? (please list below)     - Overall course: ***  - COVID status: ***yes/no/unknown, ***date(if known)    Patient is otherwise feeling well, in usual state of health, and has no additional skin concerns today.       {kkROSoptional:066824}    Labs:  {kkreviewedlabs:195476} reviewed.    Physical Exam:  Vitals: There were no vitals taken for this visit.  GEN: Well developed, well-nourished, in no acute distress, in a pleasant mood.    SKIN: {kkSkinExam:668286}  - Kim type: ***  - Will part width of ***  - The layers of hair regrowth layers were noted to be  - *** cm for the first  - *** cm at the second  - *** cm at the third   - *** cm at the fourth   - *** diffuse erythema   - *** perifollicular erythema  - *** perifollicular scale   - *** scaling of the scalp   - *** negative hair pull test   - *** normal eyelash density  - *** normal eyebrow density  - *** no nail pitting or dystrophy   - *** scalp folliculitis/pustules   - In comparison to prior  photographs, ***  - {Skin Exam Derm:280238}.   - No other lesions of concern on areas examined.     Medications:  Current Outpatient Medications   Medication     ALBUTEROL IN     azelastine (ASTELIN) 0.1 % nasal spray     Calcium Carbonate-Vitamin D (CALCIUM + D PO)     cetirizine (ZYRTEC) 10 MG tablet     Cholecalciferol (VITAMIN D) 1000 UNITS capsule     clindamycin (CLEOCIN T) 1 % external lotion     clobetasol (TEMOVATE) 0.05 % external cream     clobetasol (TEMOVATE) 0.05 % external ointment     clobetasol (TEMOVATE) 0.05 % external ointment     clobetasol propionate (CLOBEX) 0.05 % external shampoo     escitalopram (LEXAPRO) 10 MG tablet     EXEMESTANE PO     famotidine (PEPCID) 10 MG tablet     ferrous gluconate (FERGON) 324 (38 FE) MG tablet     Fluocinolone Acetonide Scalp 0.01 % OIL oil     ketoconazole (NIZORAL) 2 % shampoo     levothyroxine (SYNTHROID, LEVOTHROID) 112 MCG tablet     montelukast (SINGULAIR) 10 MG tablet     omalizumab (XOLAIR) 150 MG injection     tacrolimus (PROTOPIC) 0.1 % ointment     tretinoin (RETIN-A) 0.025 % external cream     triamcinolone acetonide (KENALOG) 10 MG/ML injection     Current Facility-Administered Medications   Medication     betamethasone acet & sod phos (CELESTONE) injection 6 mg     NONFORMULARY     triamcinolone acetonide (KENALOG-10) injection 10 mg     triamcinolone acetonide (KENALOG-10) injection 10 mg     triamcinolone acetonide (KENALOG-10) injection 10 mg     triamcinolone acetonide (KENALOG-10) injection 10 mg     triamcinolone acetonide (KENALOG-10) injection 20 mg     triamcinolone acetonide (KENALOG-10) injection 20 mg     triamcinolone acetonide (KENALOG-10) injection 24 mg     triamcinolone acetonide (KENALOG-10) injection 30 mg      Past Medical History:   Patient Active Problem List   Diagnosis     Alopecia areata     Dermatitis     Hypertrichosis     Urticaria     Autoimmune disease (H)     Dermatitis, seborrheic     Past Medical History:    Diagnosis Date     Asthma      Hypothyroidism      Lobular breast cancer (H)     s/p chemotherapy and radiation, in remission     Multiple allergies        CC Barrett Mccarty MD  [address] [place] [phone] on close of this encounter.      Again, thank you for allowing me to participate in the care of your patient.      Sincerely,    Nita Brown MD

## 2023-01-07 ENCOUNTER — HEALTH MAINTENANCE LETTER (OUTPATIENT)
Age: 68
End: 2023-01-07

## 2023-02-17 ENCOUNTER — TELEPHONE (OUTPATIENT)
Dept: DERMATOLOGY | Facility: CLINIC | Age: 68
End: 2023-02-17
Payer: COMMERCIAL

## 2023-02-17 NOTE — TELEPHONE ENCOUNTER
Writer called patient and let her know I have added her to the wait list for Dr. Brown.    Caroline LEIVA RN

## 2023-02-17 NOTE — TELEPHONE ENCOUNTER
[facility] Call Center    Phone Message    May a detailed message be left on voicemail: yes     Reason for Call: Other: Patient would like to be put on Dr. Brown's wait list for either [facility] or [facility] for late summer or fall. Writer unable to find any time slots   Please call back  Thank you    Action Taken: Message routed to:  [facility] Derm    Travel Screening: Not Applicable

## 2023-03-21 ENCOUNTER — ANCILLARY ORDERS (OUTPATIENT)
Dept: BONE DENSITY | Facility: HOSPITAL | Age: 68
End: 2023-03-21

## 2023-03-21 ENCOUNTER — ANCILLARY ORDERS (OUTPATIENT)
Dept: MAMMOGRAPHY | Facility: CLINIC | Age: 68
End: 2023-03-21

## 2023-03-21 DIAGNOSIS — C50.212 PRIMARY MALIGNANT NEOPLASM OF UPPER INNER QUADRANT OF FEMALE BREAST, LEFT (H): ICD-10-CM

## 2023-04-11 ENCOUNTER — OFFICE VISIT (OUTPATIENT)
Dept: DERMATOLOGY | Facility: CLINIC | Age: 68
End: 2023-04-11
Payer: COMMERCIAL

## 2023-04-11 VITALS — DIASTOLIC BLOOD PRESSURE: 73 MMHG | HEART RATE: 77 BPM | SYSTOLIC BLOOD PRESSURE: 131 MMHG

## 2023-04-11 DIAGNOSIS — L21.9 DERMATITIS, SEBORRHEIC: ICD-10-CM

## 2023-04-11 DIAGNOSIS — L63.9 ALOPECIA AREATA: Primary | ICD-10-CM

## 2023-04-11 DIAGNOSIS — L40.9 PSORIASIS: ICD-10-CM

## 2023-04-11 PROCEDURE — 11901 INJECT SKIN LESIONS >7: CPT | Mod: GC | Performed by: DERMATOLOGY

## 2023-04-11 RX ORDER — CLOBETASOL PROPIONATE 0.05 G/100ML
SHAMPOO TOPICAL
Qty: 118 ML | Refills: 11 | Status: SHIPPED | OUTPATIENT
Start: 2023-04-11 | End: 2024-05-06

## 2023-04-11 RX ORDER — FLUOCINOLONE ACETONIDE 0.11 MG/ML
OIL TOPICAL
Qty: 118.28 ML | Refills: 5 | Status: SHIPPED | OUTPATIENT
Start: 2023-04-11

## 2023-04-11 ASSESSMENT — PAIN SCALES - GENERAL: PAINLEVEL: NO PAIN (0)

## 2023-04-11 NOTE — NURSING NOTE
Drug Administration Record    Prior to injection, verified patient identity using patient's name and date of birth.  Due to injection administration, patient instructed to remain in clinic for 15 minutes  afterwards, and to report any adverse reaction to me immediately.    Drug Name: triamcinolone acetonide(kenalog)  Dose: 3mL of triamcinolone 10mg/mL, 30mg dose  Route administered: ID  NDC #: Kenalog-10 (8605-0993-65)  Amount of waste(mL):2ml  Reason for waste: Multi dose vial    LOT #: [id number]  SITE: see provider note  : [employer]  EXPIRATION DATE: 08/2024

## 2023-04-11 NOTE — PROGRESS NOTES
[facility] Dermatology Note   Encounter Date: Apr 11, 2023  Office visit    Dermatology Problem List:    1. Alopecia areata  - Current tx:  ILK, Clobex shampoo (1-2x a week), H&S shampoo, fluocinolone oil (less since June 2x a month), Rogaine foam (1x a week), Allegra 180 mg morning, cetirizine at night  - Prior: Lidex soln, clindamycin lotn PRN  - s/p ILK most recently on 4/12/21, 1/12/21, 10/22/2021, 1/31/22, 6/13/2022, 12/5/2022, 4/11/23 (2.3cc)  - HairMetrix 9/15/20, 4/12/21, 1/31/22, 6/13/22  2. Psoriasis, managed by [facility] dermatologist, Dr. Joshi  - Clobetasol ointment   3. Joint pain/pos JESSICA   - rheum 12/2021. No concern for inflammatory arthritis, will follow-up at [facility] 11/2022  4. Enjoys bird watching    ___________________________________________    Assessment & Plan:    # Alopecia areata  Condition has persisted with some new areas of involvement (frntal scalp) and some hair regrowth noted in previous ones (post-auricular scalp and posterior). Agreeable to ILK today. Will continue with current regimen and reassess in 6-8 weeks  - See procedure note below  - Continue with clobetasol shampoo  - Continue dermasmoothe oil 1x weekly    - Continue Rogaine at least 5x weekly   - Photodocumentation obtained in clinic  - Future: consider PO minoxidil if interested     Procedures Performed:  - Kenalog intralesional injection procedure note: after verbal consent and discussion of risks including but not limited to atrophy, pain, and bruising, time out was performed, patient positioned, area cleaned with alcohol, 2.3 ml Kenalog 10 mg/ml injected into >7 site on scalp; patient tolerated procedure well    Follow-up: 6-8 weeks for ILK with me, periodic visits with Dr. Brown    Staff Involved:  Patient was seen and staffed with attending physician Dr. Tomas Conner MD  Med/Derm Resident PGY-5  P:[phone]    Staff Addendum:  Provider Disclosure:   Staff Physician Comments:    I saw and evaluated the patient with the resident and I agree with the assessment and plan.  I was present for the entire minor procedure and examination.    Dilia Marin MD    Department of Dermatology  [facility]: Phone: [phone], Fax: [phone]  4/13/2023     ___________________________________________      CC: Derm Problem (Maria C is here alopecia follow up. ILK injections)      HPI:  Ms. Maria C Goodman is a(n) 67 year old female who presents to clinic today for alopecia areata follow up. Reports:  - thinks that her hair loss is relatively stable  - notes some hair regrowth on old spots but also a couple new ones  - has been using clobetasol shampoo, dermasmoothe oil, and topical minoxidil  - notes some thinning affect lateral eyebrows  - no involvement of eyelashes and body hair  - no pruritus, pain, or bleeding  - otherwise feeling well in usual state of health    Physical exam:  General: in no acute distress, well-developed, well-nourished  Skin:  - skin type: fair  - scattered few new alopecic patches on the frontal, temporal, and vertex scalp  - scattered many previously noted alopecic patches with hair regrowth on the temporal and posterior scalp  - No other lesions of concern on areas examined.                             Medications:  Current Outpatient Medications   Medication     ALBUTEROL IN     azelastine (ASTELIN) 0.1 % nasal spray     Calcium Carbonate-Vitamin D (CALCIUM + D PO)     cetirizine (ZYRTEC) 10 MG tablet     Cholecalciferol (VITAMIN D) 1000 UNITS capsule     clindamycin (CLEOCIN T) 1 % external lotion     clobetasol (TEMOVATE) 0.05 % external cream     clobetasol (TEMOVATE) 0.05 % external ointment     clobetasol (TEMOVATE) 0.05 % external ointment     clobetasol propionate (CLOBEX) 0.05 % external shampoo     escitalopram (LEXAPRO) 10 MG tablet     EXEMESTANE PO     famotidine  (PEPCID) 10 MG tablet     ferrous gluconate (FERGON) 324 (38 FE) MG tablet     Fluocinolone Acetonide Scalp 0.01 % OIL oil     ketoconazole (NIZORAL) 2 % shampoo     levothyroxine (SYNTHROID, LEVOTHROID) 112 MCG tablet     montelukast (SINGULAIR) 10 MG tablet     tacrolimus (PROTOPIC) 0.1 % ointment     tretinoin (RETIN-A) 0.025 % external cream     triamcinolone acetonide (KENALOG) 10 MG/ML injection     VITAMIN D PO     omalizumab (XOLAIR) 150 MG injection     Current Facility-Administered Medications   Medication     betamethasone acet & sod phos (CELESTONE) injection 6 mg     NONFORMULARY     triamcinolone acetonide (KENALOG-10) injection 10 mg     triamcinolone acetonide (KENALOG-10) injection 10 mg     triamcinolone acetonide (KENALOG-10) injection 10 mg     triamcinolone acetonide (KENALOG-10) injection 10 mg     triamcinolone acetonide (KENALOG-10) injection 20 mg     triamcinolone acetonide (KENALOG-10) injection 20 mg     triamcinolone acetonide (KENALOG-10) injection 24 mg     triamcinolone acetonide (KENALOG-10) injection 30 mg      Past Medical History:   Patient Active Problem List   Diagnosis     Alopecia areata     Dermatitis     Hypertrichosis     Urticaria     Autoimmune disease (H)     Dermatitis, seborrheic     Past Medical History:   Diagnosis Date     Asthma      Hypothyroidism      Lobular breast cancer (H)     s/p chemotherapy and radiation, in remission     Multiple allergies        IVY Mckeon  [facility]  [address] [place] [phone] on close of this encounter.

## 2023-04-11 NOTE — LETTER
4/11/2023       RE: Maria C Goodman  [address] [place] [address]     Dear Colleague,    Thank you for referring your patient, Maria C Goodman, to the [facility] [place] at [facility]. Please see a copy of my visit note below.    [facility] Dermatology Note   Encounter Date: Apr 11, 2023  Office visit    Dermatology Problem List:    1. Alopecia areata  - Current tx:  ILK, Clobex shampoo (1-2x a week), H&S shampoo, fluocinolone oil (less since June 2x a month), Rogaine foam (1x a week), Allegra 180 mg morning, cetirizine at night  - Prior: Lidex soln, clindamycin lotn PRN  - s/p ILK most recently on 4/12/21, 1/12/21, 10/22/2021, 1/31/22, 6/13/2022, 12/5/2022, 4/11/23 (2.3cc)  - HairMetrix 9/15/20, 4/12/21, 1/31/22, 6/13/22  2. Psoriasis, managed by [facility]ologist, Dr. Joshi  - Clobetasol ointment   3. Joint pain/pos JESSICA   - rheum 12/2021. No concern for inflammatory arthritis, will follow-up at [facility] 11/2022  4. Enjoys bird watching    ___________________________________________    Assessment & Plan:    # Alopecia areata  Condition has persisted with some new areas of involvement (frntal scalp) and some hair regrowth noted in previous ones (post-auricular scalp and posterior). Agreeable to ILK today. Will continue with current regimen and reassess in 6-8 weeks  - See procedure note below  - Continue with clobetasol shampoo  - Continue dermasmoothe oil 1x weekly    - Continue Rogaine at least 5x weekly   - Photodocumentation obtained in clinic  - Future: consider PO minoxidil if interested     Procedures Performed:  - Kenalog intralesional injection procedure note: after verbal consent and discussion of risks including but not limited to atrophy, pain, and bruising, time out was performed, patient positioned, area cleaned with alcohol, 2.3 ml Kenalog 10 mg/ml injected into >7 site on scalp; patient  tolerated procedure well    Follow-up: 6-8 weeks for ILK with me, periodic visits with Dr. Brown    Staff Involved:  Patient was seen and staffed with attending physician Dr. Tomas Conner MD  Med/Derm Resident PGY-5  P:[phone]    Staff Addendum:  Provider Disclosure:   Staff Physician Comments:   I saw and evaluated the patient with the resident and I agree with the assessment and plan.  I was present for the entire minor procedure and examination.    Dilia Marin MD    Department of Dermatology  [facility]: Phone: [phone], Fax: [phone]  4/13/2023     ___________________________________________      CC: Derm Problem (Maria C is here alopecia follow up. ILK injections)      HPI:  Ms. Maria C Goodman is a(n) 67 year old female who presents to clinic today for alopecia areata follow up. Reports:  - thinks that her hair loss is relatively stable  - notes some hair regrowth on old spots but also a couple new ones  - has been using clobetasol shampoo, dermasmoothe oil, and topical minoxidil  - notes some thinning affect lateral eyebrows  - no involvement of eyelashes and body hair  - no pruritus, pain, or bleeding  - otherwise feeling well in usual state of health    Physical exam:  General: in no acute distress, well-developed, well-nourished  Skin:  - skin type: fair  - scattered few new alopecic patches on the frontal, temporal, and vertex scalp  - scattered many previously noted alopecic patches with hair regrowth on the temporal and posterior scalp  - No other lesions of concern on areas examined.                             Medications:  Current Outpatient Medications   Medication    ALBUTEROL IN    azelastine (ASTELIN) 0.1 % nasal spray    Calcium Carbonate-Vitamin D (CALCIUM + D PO)    cetirizine (ZYRTEC) 10 MG tablet    Cholecalciferol (VITAMIN D) 1000 UNITS capsule    clindamycin  (CLEOCIN T) 1 % external lotion    clobetasol (TEMOVATE) 0.05 % external cream    clobetasol (TEMOVATE) 0.05 % external ointment    clobetasol (TEMOVATE) 0.05 % external ointment    clobetasol propionate (CLOBEX) 0.05 % external shampoo    escitalopram (LEXAPRO) 10 MG tablet    EXEMESTANE PO    famotidine (PEPCID) 10 MG tablet    ferrous gluconate (FERGON) 324 (38 FE) MG tablet    Fluocinolone Acetonide Scalp 0.01 % OIL oil    ketoconazole (NIZORAL) 2 % shampoo    levothyroxine (SYNTHROID, LEVOTHROID) 112 MCG tablet    montelukast (SINGULAIR) 10 MG tablet    tacrolimus (PROTOPIC) 0.1 % ointment    tretinoin (RETIN-A) 0.025 % external cream    triamcinolone acetonide (KENALOG) 10 MG/ML injection    VITAMIN D PO    omalizumab (XOLAIR) 150 MG injection     Current Facility-Administered Medications   Medication    betamethasone acet & sod phos (CELESTONE) injection 6 mg    NONFORMULARY    triamcinolone acetonide (KENALOG-10) injection 10 mg    triamcinolone acetonide (KENALOG-10) injection 10 mg    triamcinolone acetonide (KENALOG-10) injection 10 mg    triamcinolone acetonide (KENALOG-10) injection 10 mg    triamcinolone acetonide (KENALOG-10) injection 20 mg    triamcinolone acetonide (KENALOG-10) injection 20 mg    triamcinolone acetonide (KENALOG-10) injection 24 mg    triamcinolone acetonide (KENALOG-10) injection 30 mg      Past Medical History:   Patient Active Problem List   Diagnosis    Alopecia areata    Dermatitis    Hypertrichosis    Urticaria    Autoimmune disease (H)    Dermatitis, seborrheic     Past Medical History:   Diagnosis Date    Asthma     Hypothyroidism     Lobular breast cancer (H)     s/p chemotherapy and radiation, in remission    Multiple allergies        CC Damaris Mckeon  [facility]  [facility] [place] [address] on close of this encounter.

## 2023-04-11 NOTE — NURSING NOTE
Dermatology Rooming Note    Maria C Goodman's goals for this visit include:   Chief Complaint   Patient presents with     Derm Problem     Maria C is here alopecia follow up. ILK injections     Sidra Campuzano RN

## 2023-04-11 NOTE — PATIENT INSTRUCTIONS
The use of minoxidil by  mouth to grow hair is not FDA approved.   Description and Brand Names ([facility])       Drug information provided by: [email or web address]    US Brand Name  Brock Courtney    Minoxidil belongs to the general class of medicines called antihypertensives. It is used to treat high blood pressure (hypertension).    High blood pressure adds to the workload of the heart and arteries. If it continues for a long time, the heart and arteries may not function properly. This can damage the blood vessels of the brain, heart, and kidneys, resulting in a stroke, heart failure, or kidney failure. High blood pressure may also increase the risk of heart attacks. These problems may be less likely to occur if blood pressure is controlled.    Minoxidil works by relaxing blood vessels so that blood passes through them more easily. This helps to lower blood pressure.    Minoxidil has other effects that could be bothersome for some patients. These include increased hair growth, weight gain, fast heartbeat, and chest pain. Before you take this medicine, be sure that you have discussed the use of it with your doctor.    Minoxidil is being applied to the scalp in liquid form by some balding men to stimulate hair growth. However, improper use of liquids made from minoxidil tablets can result in minoxidil being absorbed into the body, where it may cause unwanted effects on the heart and blood vessels.    Minoxidil is available only with your doctor's prescription.      Before Using  Drug information provided by: [email or web address]    In deciding to use a medicine, the risks of taking the medicine must be weighed against the good it will do. This is a decision you and your doctor will make. For this medicine, the following should be considered:    Allergies  Tell your doctor if you have ever had any unusual or allergic reaction to this medicine or any other medicines. Also tell your health care professional if you have  any other types of allergies, such as to foods, dyes, preservatives, or animals. For non-prescription products, read the label or package ingredients carefully.    Geriatric  Elderly patients may be more sensitive to the effects of minoxidil. In addition, minoxidil may reduce tolerance to cold temperatures in elderly patients.    Breastfeeding  Studies in women suggest that this medication poses minimal risk to the infant when used during breastfeeding.    Drug Interactions  Although certain medicines should not be used together at all, in other cases two different medicines may be used together even if an interaction might occur. In these cases, your doctor may want to change the dose, or other precautions may be necessary. Tell your healthcare professional if you are taking any other prescription or nonprescription (over-the-counter [OTC]) medicine.    Other Interactions  Certain medicines should not be used at or around the time of eating food or eating certain types of food since interactions may occur. Using alcohol or tobacco with certain medicines may also cause interactions to occur. Discuss with your healthcare professional the use of your medicine with food, alcohol, or tobacco.    Other Medical Problems  The presence of other medical problems may affect the use of this medicine. Make sure you tell your doctor if you have any other medical problems, especially:    Angina (chest pain)--Minoxidil may make this condition worse  Heart attack or stroke (recent)--Lowering blood pressure may make problems resulting from heart attack or stroke worse  Heart or blood vessel disease--Minoxidil can cause fluid buildup, which can cause problems  Kidney disease--Effects may be increased because of slower removal of minoxidil from the body  Pheochromocytoma--Minoxidil may cause the tumor to be more active    Storage  Store the medicine in a closed container at room temperature, away from heat, moisture, and direct light.  Keep from freezing.    Keep out of the reach of children.    Do not keep outdated medicine or medicine no longer needed.    Precautions  Drug information provided by: [employer]    It is important that your doctor check your progress at regular visits to make sure that this medicine is working properly.    Ask your doctor about checking your pulse rate before and after taking minoxidil. Then, while you are taking this medicine, check your pulse regularly while you are resting. If it increases by 20 beats or more a minute, check with your doctor right away.    While you are taking minoxidil, weigh yourself every day. A weight gain of 2 to 3 pounds (about 1 kg) in an adult is normal and should be lost with continued treatment. However, if you suddenly gain 5 pounds (2 kg) or more (for a child, 2 pounds [1 kg] or more) or if you notice swelling of your feet or lower legs, check with your doctor right away.    Do not take other medicines unless they have been discussed with your doctor. This especially includes over-the-counter (nonprescription) medicines for appetite control, asthma, colds, cough, hay fever, or sinus problems, since they may tend to increase your blood pressure.    Side Effects  Drug information provided by: [employer]    Along with its needed effects, a medicine may cause some unwanted effects. Although not all of these side effects may occur, if they do occur they may need medical attention.    Check with your doctor immediately if any of the following side effects occur:    More common  Fast or irregular heartbeat  weight gain (rapid) of more than 5 pounds (2 pounds in children)  Less common  Chest pain  shortness of breath  Check with your doctor as soon as possible if any of the following side effects occur:    More common  Bloating  flushing or redness of skin  swelling of feet or lower legs  Less common  Numbness or tingling of hands, feet, or face  Rare  Skin rash and itching  Some side  effects may occur that usually do not need medical attention. These side effects may go away during treatment as your body adjusts to the medicine. Also, your health care professional may be able to tell you about ways to prevent or reduce some of these side effects. Check with your health care professional if any of the following side effects continue or are bothersome or if you have any questions about them:    More common  Increase in hair growth, usually on face, arms, and back  Less common or rare  Breast tenderness in males and females  headache  This medicine causes a temporary increase in hair growth in most people. Hair may grow longer and darker in both men and women. This may first be noticed on the face several weeks after you start taking minoxidil. Later, new hair growth may be noticed on the back, arms, legs, and scalp. Talk to your doctor about shaving or using a hair remover during this time. After treatment with minoxidil has ended, the hair will stop growing, although it may take several months for the new hair growth to go away.    Other side effects not listed may also occur in some patients. If you notice any other effects, check with your healthcare professional.    Call your doctor for medical advice about side effects. You may report side effects to the FDA at 7-532-FDA-0601.

## 2023-05-10 ENCOUNTER — OFFICE VISIT (OUTPATIENT)
Dept: DERMATOLOGY | Facility: CLINIC | Age: 68
End: 2023-05-10
Payer: COMMERCIAL

## 2023-05-10 DIAGNOSIS — L63.9 ALOPECIA AREATA: Primary | ICD-10-CM

## 2023-05-10 PROCEDURE — 11901 INJECT SKIN LESIONS >7: CPT | Performed by: DERMATOLOGY

## 2023-05-10 ASSESSMENT — PAIN SCALES - GENERAL: PAINLEVEL: NO PAIN (0)

## 2023-05-10 NOTE — NURSING NOTE
Drug Administration Record    Prior to injection, verified patient identity using patient's name and date of birth.  Due to injection administration, patient instructed to remain in clinic for 15 minutes  afterwards, and to report any adverse reaction to me immediately.    Drug Name: triamcinolone acetonide(kenalog)  Dose: 2mL of triamcinolone 10mg/mL, 20mg dose  Route administered: ID  NDC #: Kenalog-10 (5269-1226-72)  Amount of waste(mL):3ml  Reason for waste: Multi dose vial    LOT #: [id number]  SITE: see provider note  : [employer]  EXPIRATION DATE: 9/2024

## 2023-05-10 NOTE — LETTER
5/10/2023       RE: Maria C Goodman  [address] [place] [address]     Dear Colleague,    Thank you for referring your patient, Maria C Goodman, to the [facility] [place] at [facility]. Please see a copy of my visit note below.    [facility] Dermatology Note   Encounter Date: May 10, 2023  Office visit    Dermatology Problem List:    1. Alopecia areata  - Current tx:  ILK, Clobex shampoo (1-2x a week), H&S shampoo, fluocinolone oil (less since June 2x a month), Rogaine foam (1x a week), Allegra 180 mg morning, cetirizine at night  - Prior: Lidex soln, clindamycin lotn PRN  - s/p ILK most recently on 4/12/21, 1/12/21, 10/22/2021, 1/31/22, 6/13/2022, 12/5/2022, 4/11/23, 5/10/2023  - HairMetrix 9/15/20, 4/12/21, 1/31/22, 6/13/22  2. Psoriasis, managed by [facility] dermatologist, Dr. Joshi  - Clobetasol ointment   3. Joint pain/pos JESSICA   - rheum 12/2021. No concern for inflammatory arthritis, will follow-up at [facility] 11/2022  4. Enjoys bird watching    ___________________________________________    Assessment & Plan:    # Alopecia areata- chronic, active - continues to respond to ILK, no other changes today  - See procedure note below  - Continue with clobetasol shampoo  - Continue dermasmoothe oil 1x weekly    - Continue Rogaine at least 5x weekly   - Photodocumentation obtained in clinic  - Future: consider PO minoxidil if interested     Procedures Performed:  - Kenalog intralesional injection procedure note: after verbal consent and discussion of risks including but not limited to atrophy, pain, and bruising, time out was performed, patient positioned, area cleaned with alcohol, 2.0 ml Kenalog 10 mg/ml injected into >7 sites on scalp; patient tolerated procedure well    Follow-up: 6-8 weeks for ILK with me, periodic visits with Dr. Brown    Staff Involved:  Patient was seen and staffed with attending  physician Dr. Tomas Marin MD    Department of Dermatology  [facility]: Phone: [phone], Fax: [phone]  5/10/2023      ___________________________________________      CC: Derm Problem (ILK injection. Has no known areas of concern)      HPI:  Ms. Maria C Goodman is a(n) 68 year old female who presents to clinic today for alopecia areata follow up.    Overall doing well, here for ILK.    Physical exam:  General: in no acute distress, well-developed, well-nourished  Skin: few more discrete alopecic patches on frontal and parietal scalp, more diffuse thinning bitemporal region and vertex, good regrowth within, no PF erythema or scale    Medications:  Current Outpatient Medications   Medication    ALBUTEROL IN    azelastine (ASTELIN) 0.1 % nasal spray    Calcium Carbonate-Vitamin D (CALCIUM + D PO)    cetirizine (ZYRTEC) 10 MG tablet    Cholecalciferol (VITAMIN D) 1000 UNITS capsule    clobetasol (TEMOVATE) 0.05 % external ointment    clobetasol propionate (CLOBEX) 0.05 % external shampoo    escitalopram (LEXAPRO) 10 MG tablet    EXEMESTANE PO    famotidine (PEPCID) 10 MG tablet    ferrous gluconate (FERGON) 324 (38 FE) MG tablet    Fluocinolone Acetonide Scalp 0.01 % OIL oil    ketoconazole (NIZORAL) 2 % shampoo    levothyroxine (SYNTHROID, LEVOTHROID) 112 MCG tablet    montelukast (SINGULAIR) 10 MG tablet    tacrolimus (PROTOPIC) 0.1 % ointment    tretinoin (RETIN-A) 0.025 % external cream    triamcinolone acetonide (KENALOG) 10 MG/ML injection    VITAMIN D PO    omalizumab (XOLAIR) 150 MG injection     Current Facility-Administered Medications   Medication    betamethasone acet & sod phos (CELESTONE) injection 6 mg    NONFORMULARY    triamcinolone acetonide (KENALOG-10) injection 10 mg    triamcinolone acetonide (KENALOG-10) injection 10 mg    triamcinolone acetonide (KENALOG-10) injection 10 mg     triamcinolone acetonide (KENALOG-10) injection 10 mg    triamcinolone acetonide (KENALOG-10) injection 20 mg    triamcinolone acetonide (KENALOG-10) injection 20 mg    triamcinolone acetonide (KENALOG-10) injection 20 mg    triamcinolone acetonide (KENALOG-10) injection 24 mg    triamcinolone acetonide (KENALOG-10) injection 30 mg    triamcinolone acetonide (KENALOG-10) injection 30 mg      Past Medical History:   Patient Active Problem List   Diagnosis    Alopecia areata    Dermatitis    Hypertrichosis    Urticaria    Autoimmune disease (H)    Dermatitis, seborrheic     Past Medical History:   Diagnosis Date    Asthma     Hypothyroidism     Lobular breast cancer (H)     s/p chemotherapy and radiation, in remission    Multiple allergies        CC Damaris Mckeon  [facility]  [address] [place] [address] on close of this encounter.

## 2023-05-10 NOTE — NURSING NOTE
Dermatology Rooming Note    Maria C Goodman's goals for this visit include:   Chief Complaint   Patient presents with     Derm Problem     ILK injection. Has no known areas of concern     Simona Ventura, Visit Facilitator

## 2023-05-10 NOTE — PROGRESS NOTES
[facility] Dermatology Note   Encounter Date: May 10, 2023  Office visit    Dermatology Problem List:    1. Alopecia areata  - Current tx:  ILK, Clobex shampoo (1-2x a week), H&S shampoo, fluocinolone oil (less since June 2x a month), Rogaine foam (1x a week), Allegra 180 mg morning, cetirizine at night  - Prior: Lidex soln, clindamycin lotn PRN  - s/p ILK most recently on 4/12/21, 1/12/21, 10/22/2021, 1/31/22, 6/13/2022, 12/5/2022, 4/11/23, 5/10/2023  - HairMetrix 9/15/20, 4/12/21, 1/31/22, 6/13/22  2. Psoriasis, managed by [facility] dermatologist, Dr. Joshi  - Clobetasol ointment   3. Joint pain/pos JESSICA   - rheum 12/2021. No concern for inflammatory arthritis, will follow-up at [facility] 11/2022  4. Enjoys bird watching    ___________________________________________    Assessment & Plan:    # Alopecia areata- chronic, active - continues to respond to ILK, no other changes today  - See procedure note below  - Continue with clobetasol shampoo  - Continue dermasmoothe oil 1x weekly    - Continue Rogaine at least 5x weekly   - Photodocumentation obtained in clinic  - Future: consider PO minoxidil if interested     Procedures Performed:  - Kenalog intralesional injection procedure note: after verbal consent and discussion of risks including but not limited to atrophy, pain, and bruising, time out was performed, patient positioned, area cleaned with alcohol, 2.0 ml Kenalog 10 mg/ml injected into >7 sites on scalp; patient tolerated procedure well    Follow-up: 6-8 weeks for ILK with me, periodic visits with Dr. Brown    Staff Involved:  Patient was seen and staffed with attending physician Dr. Tomas Marin MD    Department of Dermatology  [facility]: Phone: [phone], Fax: [phone]  5/10/2023      ___________________________________________      CC: Derm Problem (ILK injection. Has no  known areas of concern)      HPI:  Ms. Maria C Goodman is a(n) 68 year old female who presents to clinic today for alopecia areata follow up.    Overall doing well, here for ILK.    Physical exam:  General: in no acute distress, well-developed, well-nourished  Skin: few more discrete alopecic patches on frontal and parietal scalp, more diffuse thinning bitemporal region and vertex, good regrowth within, no PF erythema or scale    Medications:  Current Outpatient Medications   Medication     ALBUTEROL IN     azelastine (ASTELIN) 0.1 % nasal spray     Calcium Carbonate-Vitamin D (CALCIUM + D PO)     cetirizine (ZYRTEC) 10 MG tablet     Cholecalciferol (VITAMIN D) 1000 UNITS capsule     clobetasol (TEMOVATE) 0.05 % external ointment     clobetasol propionate (CLOBEX) 0.05 % external shampoo     escitalopram (LEXAPRO) 10 MG tablet     EXEMESTANE PO     famotidine (PEPCID) 10 MG tablet     ferrous gluconate (FERGON) 324 (38 FE) MG tablet     Fluocinolone Acetonide Scalp 0.01 % OIL oil     ketoconazole (NIZORAL) 2 % shampoo     levothyroxine (SYNTHROID, LEVOTHROID) 112 MCG tablet     montelukast (SINGULAIR) 10 MG tablet     tacrolimus (PROTOPIC) 0.1 % ointment     tretinoin (RETIN-A) 0.025 % external cream     triamcinolone acetonide (KENALOG) 10 MG/ML injection     VITAMIN D PO     omalizumab (XOLAIR) 150 MG injection     Current Facility-Administered Medications   Medication     betamethasone acet & sod phos (CELESTONE) injection 6 mg     NONFORMULARY     triamcinolone acetonide (KENALOG-10) injection 10 mg     triamcinolone acetonide (KENALOG-10) injection 10 mg     triamcinolone acetonide (KENALOG-10) injection 10 mg     triamcinolone acetonide (KENALOG-10) injection 10 mg     triamcinolone acetonide (KENALOG-10) injection 20 mg     triamcinolone acetonide (KENALOG-10) injection 20 mg     triamcinolone acetonide (KENALOG-10) injection 20 mg     triamcinolone acetonide (KENALOG-10) injection 24 mg     triamcinolone  acetonide (KENALOG-10) injection 30 mg     triamcinolone acetonide (KENALOG-10) injection 30 mg      Past Medical History:   Patient Active Problem List   Diagnosis     Alopecia areata     Dermatitis     Hypertrichosis     Urticaria     Autoimmune disease (H)     Dermatitis, seborrheic     Past Medical History:   Diagnosis Date     Asthma      Hypothyroidism      Lobular breast cancer (H)     s/p chemotherapy and radiation, in remission     Multiple allergies        CC Damaris Mckeon  [facility]  [address] [place] [address] on close of this encounter.

## 2023-06-13 ENCOUNTER — OFFICE VISIT (OUTPATIENT)
Dept: DERMATOLOGY | Facility: CLINIC | Age: 68
End: 2023-06-13
Payer: COMMERCIAL

## 2023-06-13 DIAGNOSIS — L63.9 ALOPECIA AREATA: Primary | ICD-10-CM

## 2023-06-13 PROCEDURE — 11901 INJECT SKIN LESIONS >7: CPT | Performed by: DERMATOLOGY

## 2023-06-13 ASSESSMENT — PAIN SCALES - GENERAL: PAINLEVEL: NO PAIN (0)

## 2023-06-13 NOTE — NURSING NOTE
Dermatology Rooming Note    Maria C Goodman's goals for this visit include:   Chief Complaint   Patient presents with     Derm Problem     ILK Injection      Silvia Al

## 2023-06-13 NOTE — PROGRESS NOTES
[facility] Dermatology Note   Encounter Date: Jun 13, 2023  Office visit    Dermatology Problem List:    1. Alopecia areata  - Current tx:  ILK, Clobex shampoo (1-2x a week), H&S shampoo, fluocinolone oil (less since June 2x a month), Rogaine foam (1x a week), Allegra 180 mg morning, cetirizine at night  - Prior: Lidex soln, clindamycin lotn PRN  - s/p ILK most recently on 4/12/21, 1/12/21, 10/22/2021, 1/31/22, 6/13/2022, 12/5/2022, 4/11/23, 5/10/2023  - HairMetrix 9/15/20, 4/12/21, 1/31/22, 6/13/22  2. Psoriasis, managed by [facility] dermatologist, Dr. Joshi  - Clobetasol ointment   3. Joint pain/pos JESSICA   - rheum 12/2021. No concern for inflammatory arthritis, will follow-up at [facility] 11/2022  4. Enjoys bird watching    ___________________________________________    Assessment & Plan:    # Alopecia areata- chronic, active - continues to respond to ILK, no other changes today  - See procedure note below  - Continue with clobetasol shampoo  - Continue dermasmoothe oil 1x weekly    - Continue Rogaine at least 5x weekly   - Photodocumentation obtained in clinic  - Future: consider PO minoxidil if interested     Procedures Performed:  - Kenalog intralesional injection procedure note: after verbal consent and discussion of risks including but not limited to atrophy, pain, and bruising, time out was performed, patient positioned, area cleaned with alcohol, 2.0 ml Kenalog 10 mg/ml injected into approx 20 sites on scalp; patient tolerated procedure well    Follow-up: 6-8 weeks for ILK with me, periodic visits with Dr. Brown    Staff Involved:  Dilia Marin MD    Department of Dermatology  [facility]: Phone: [phone], Fax: [phone]  6/13/2023      ___________________________________________      CC: Derm Problem (ILK Injection )      HPI:  Ms. Maria C Goodman is a(n) 68 year old female who presents to  clinic today for alopecia areata follow up.    Overall doing well, here for ILK.  Was not using usual routine for past month while camping/traveling but planning to get back on track.  Notes psoriasis is a little better on ILK    Physical exam:  General: in no acute distress, well-developed, well-nourished  Skin: few more discrete alopecic patches on frontal and parietal scalp, more diffuse thinning bitemporal region and vertex, good regrowth within, no PF erythema or scale    Medications:  Current Outpatient Medications   Medication     ALBUTEROL IN     azelastine (ASTELIN) 0.1 % nasal spray     Calcium Carbonate-Vitamin D (CALCIUM + D PO)     cetirizine (ZYRTEC) 10 MG tablet     Cholecalciferol (VITAMIN D) 1000 UNITS capsule     clobetasol (TEMOVATE) 0.05 % external ointment     clobetasol propionate (CLOBEX) 0.05 % external shampoo     escitalopram (LEXAPRO) 10 MG tablet     EXEMESTANE PO     famotidine (PEPCID) 10 MG tablet     ferrous gluconate (FERGON) 324 (38 FE) MG tablet     Fluocinolone Acetonide Scalp 0.01 % OIL oil     ketoconazole (NIZORAL) 2 % shampoo     levothyroxine (SYNTHROID, LEVOTHROID) 112 MCG tablet     montelukast (SINGULAIR) 10 MG tablet     tacrolimus (PROTOPIC) 0.1 % ointment     tretinoin (RETIN-A) 0.025 % external cream     triamcinolone acetonide (KENALOG) 10 MG/ML injection     VITAMIN D PO     omalizumab (XOLAIR) 150 MG injection     Current Facility-Administered Medications   Medication     betamethasone acet & sod phos (CELESTONE) injection 6 mg     NONFORMULARY     triamcinolone acetonide (KENALOG-10) injection 10 mg     triamcinolone acetonide (KENALOG-10) injection 10 mg     triamcinolone acetonide (KENALOG-10) injection 10 mg     triamcinolone acetonide (KENALOG-10) injection 10 mg     triamcinolone acetonide (KENALOG-10) injection 20 mg     triamcinolone acetonide (KENALOG-10) injection 20 mg     triamcinolone acetonide (KENALOG-10) injection 20 mg     triamcinolone acetonide  (KENALOG-10) injection 24 mg     triamcinolone acetonide (KENALOG-10) injection 30 mg     triamcinolone acetonide (KENALOG-10) injection 30 mg      Past Medical History:   Patient Active Problem List   Diagnosis     Alopecia areata     Dermatitis     Hypertrichosis     Urticaria     Autoimmune disease (H)     Dermatitis, seborrheic     Past Medical History:   Diagnosis Date     Asthma      Hypothyroidism      Lobular breast cancer (H)     s/p chemotherapy and radiation, in remission     Multiple allergies        CC Damaris Mckeon  [facility]  [address] [place] [address] on close of this encounter.

## 2023-06-13 NOTE — LETTER
6/13/2023       RE: Maria C Goodman  [address] [place] [address]     Dear Colleague,    Thank you for referring your patient, Maria C Goodman, to the [facility] [place] at [facility]. Please see a copy of my visit note below.    [facility] Dermatology Note   Encounter Date: Jun 13, 2023  Office visit    Dermatology Problem List:    1. Alopecia areata  - Current tx:  ILK, Clobex shampoo (1-2x a week), H&S shampoo, fluocinolone oil (less since June 2x a month), Rogaine foam (1x a week), Allegra 180 mg morning, cetirizine at night  - Prior: Lidex soln, clindamycin lotn PRN  - s/p ILK most recently on 4/12/21, 1/12/21, 10/22/2021, 1/31/22, 6/13/2022, 12/5/2022, 4/11/23, 5/10/2023  - HairMetrix 9/15/20, 4/12/21, 1/31/22, 6/13/22  2. Psoriasis, managed by [facility] dermatologist, Dr. Joshi  - Clobetasol ointment   3. Joint pain/pos JESSICA   - rheum 12/2021. No concern for inflammatory arthritis, will follow-up at [facility] 11/2022  4. Enjoys bird watching    ___________________________________________    Assessment & Plan:    # Alopecia areata- chronic, active - continues to respond to ILK, no other changes today  - See procedure note below  - Continue with clobetasol shampoo  - Continue dermasmoothe oil 1x weekly    - Continue Rogaine at least 5x weekly   - Photodocumentation obtained in clinic  - Future: consider PO minoxidil if interested     Procedures Performed:  - Kenalog intralesional injection procedure note: after verbal consent and discussion of risks including but not limited to atrophy, pain, and bruising, time out was performed, patient positioned, area cleaned with alcohol, 2.0 ml Kenalog 10 mg/ml injected into approx 20 sites on scalp; patient tolerated procedure well    Follow-up: 6-8 weeks for ILK with me, periodic visits with Dr. Brown    Staff Involved:  Dilia Marin MD  Assistant    Department of Dermatology  [facility]: Phone: [phone], Fax: [phone]  6/13/2023      ___________________________________________      CC: Derm Problem (ILK Injection )      HPI:  Ms. Maria C Goodman is a(n) 68 year old female who presents to clinic today for alopecia areata follow up.    Overall doing well, here for ILK.  Was not using usual routine for past month while camping/traveling but planning to get back on track.  Notes psoriasis is a little better on ILK    Physical exam:  General: in no acute distress, well-developed, well-nourished  Skin: few more discrete alopecic patches on frontal and parietal scalp, more diffuse thinning bitemporal region and vertex, good regrowth within, no PF erythema or scale    Medications:  Current Outpatient Medications   Medication    ALBUTEROL IN    azelastine (ASTELIN) 0.1 % nasal spray    Calcium Carbonate-Vitamin D (CALCIUM + D PO)    cetirizine (ZYRTEC) 10 MG tablet    Cholecalciferol (VITAMIN D) 1000 UNITS capsule    clobetasol (TEMOVATE) 0.05 % external ointment    clobetasol propionate (CLOBEX) 0.05 % external shampoo    escitalopram (LEXAPRO) 10 MG tablet    EXEMESTANE PO    famotidine (PEPCID) 10 MG tablet    ferrous gluconate (FERGON) 324 (38 FE) MG tablet    Fluocinolone Acetonide Scalp 0.01 % OIL oil    ketoconazole (NIZORAL) 2 % shampoo    levothyroxine (SYNTHROID, LEVOTHROID) 112 MCG tablet    montelukast (SINGULAIR) 10 MG tablet    tacrolimus (PROTOPIC) 0.1 % ointment    tretinoin (RETIN-A) 0.025 % external cream    triamcinolone acetonide (KENALOG) 10 MG/ML injection    VITAMIN D PO    omalizumab (XOLAIR) 150 MG injection     Current Facility-Administered Medications   Medication    betamethasone acet & sod phos (CELESTONE) injection 6 mg    NONFORMULARY    triamcinolone acetonide (KENALOG-10) injection 10 mg    triamcinolone acetonide (KENALOG-10) injection 10 mg     triamcinolone acetonide (KENALOG-10) injection 10 mg    triamcinolone acetonide (KENALOG-10) injection 10 mg    triamcinolone acetonide (KENALOG-10) injection 20 mg    triamcinolone acetonide (KENALOG-10) injection 20 mg    triamcinolone acetonide (KENALOG-10) injection 20 mg    triamcinolone acetonide (KENALOG-10) injection 24 mg    triamcinolone acetonide (KENALOG-10) injection 30 mg    triamcinolone acetonide (KENALOG-10) injection 30 mg      Past Medical History:   Patient Active Problem List   Diagnosis    Alopecia areata    Dermatitis    Hypertrichosis    Urticaria    Autoimmune disease (H)    Dermatitis, seborrheic     Past Medical History:   Diagnosis Date    Asthma     Hypothyroidism     Lobular breast cancer (H)     s/p chemotherapy and radiation, in remission    Multiple allergies        CC Damaris Mckeon  [facility]  [address] [place] [address] on close of this encounter.

## 2023-06-13 NOTE — NURSING NOTE
Drug Administration Record    Prior to injection, verified patient identity using patient's name and date of birth.  Due to injection administration, patient instructed to remain in clinic for 15 minutes  afterwards, and to report any adverse reaction to me immediately.    Drug Name: triamcinolone acetonide(kenalog)  Dose: 2mL of triamcinolone 10mg/mL, 20mg dose  Route administered: ID  NDC #: Kenalog-10 (5423-9617-37)  Amount of waste(mL):3ml  Reason for waste: Multi dose vial    LOT #: [id number]  SITE: see provider note  : [facility]  EXPIRATION DATE: 9/24

## 2023-06-19 ENCOUNTER — HOSPITAL ENCOUNTER (OUTPATIENT)
Dept: BONE DENSITY | Facility: HOSPITAL | Age: 68
Discharge: HOME | End: 2023-06-19
Attending: INTERNAL MEDICINE | Admitting: INTERNAL MEDICINE
Payer: COMMERCIAL

## 2023-06-19 DIAGNOSIS — C50.212 PRIMARY MALIGNANT NEOPLASM OF UPPER INNER QUADRANT OF FEMALE BREAST, LEFT (H): ICD-10-CM

## 2023-06-19 PROCEDURE — 77080 DXA BONE DENSITY AXIAL: CPT

## 2023-07-12 ENCOUNTER — OFFICE VISIT (OUTPATIENT)
Dept: DERMATOLOGY | Facility: CLINIC | Age: 68
End: 2023-07-12
Payer: COMMERCIAL

## 2023-07-12 DIAGNOSIS — L63.9 ALOPECIA AREATA: Primary | ICD-10-CM

## 2023-07-12 DIAGNOSIS — R23.8 LONGITUDINAL RIDGING OF NAIL: ICD-10-CM

## 2023-07-12 PROCEDURE — 11901 INJECT SKIN LESIONS >7: CPT | Mod: GC | Performed by: DERMATOLOGY

## 2023-07-12 PROCEDURE — 99213 OFFICE O/P EST LOW 20 MIN: CPT | Mod: 25 | Performed by: DERMATOLOGY

## 2023-07-12 RX ORDER — FLUOCINONIDE TOPICAL SOLUTION USP, 0.05% 0.5 MG/ML
SOLUTION TOPICAL 2 TIMES DAILY PRN
Qty: 60 ML | Refills: 5 | Status: SHIPPED | OUTPATIENT
Start: 2023-07-12 | End: 2024-05-06

## 2023-07-12 ASSESSMENT — PAIN SCALES - GENERAL: PAINLEVEL: NO PAIN (0)

## 2023-07-12 NOTE — NURSING NOTE
Dermatology Rooming Note    Maria C Goodman's goals for this visit include:   Chief Complaint   Patient presents with     Derm Problem     Here for ILK injections.      Sidra Campuzano RN

## 2023-07-12 NOTE — LETTER
7/12/2023       RE: Maria C Goodman  [address] [place] [address]     Dear Colleague,    Thank you for referring your patient, Maria C Goodman, to the [facility] [place] at [facility]. Please see a copy of my visit note below.    [facility] Dermatology Note   Encounter Date: Jul 12, 2023  Office visit    Dermatology Problem List:  1. Alopecia areata  - Current tx:  ILK, Clobex shampoo (1-2x a week), H&S shampoo, fluocinolone oil (less since June 2x a month), fluocinonide solution, Rogaine foam (5x a week), Allegra 180 mg morning, cetirizine at night  - Prior: clindamycin lotn PRN  - s/p ILK most recently on 4/12/21, 1/12/21, 10/22/2021, 1/31/22, 6/13/2022, 12/5/2022, 4/11/23, 5/10/2023  - HairMetrix 9/15/20, 4/12/21, 1/31/22, 6/13/22  2. Psoriasis, managed by [facility] dermatologist, Dr. Joshi  - Clobetasol ointment   3. Joint pain/pos JESSICA   - rheum 12/2021. No concern for inflammatory arthritis, will follow-up at [facility] 11/2022  4. Enjoys bird watching    ___________________________________________    Assessment & Plan:  # Alopecia areata- chronic, active - continues to respond to ILK, will add lidex solution for intermittent scalp itch since oil is difficult to use  - See procedure note below  - Start lidex solution 1-2 times daily  - Continue with clobetasol shampoo  - Continue dermasmoothe oil 1x weekly    - Continue Rogaine at least 5x weekly   - Future: consider PO minoxidil if interested    # Ridging of fingernails.  - Advised could be onychorrhexis and related to normal nail aging; could also have component of trachyonychia r/t AA  - Can trial amlactin if desired      Procedures Performed:  - Kenalog intralesional injection procedure note: after verbal consent and discussion of risks including but not limited to atrophy, pain, and bruising, time out was performed, patient positioned, area cleaned with  alcohol, 2.0 ml Kenalog 10 mg/ml injected into approx 20 sites on scalp; patient tolerated procedure well    Follow-up: 6-8 weeks for ILK with me, periodic visits with Dr. Brown      Resident: Boris Pleitez MD    Staff Physician Comments:   I saw and evaluated the patient with the resident and I agree with the assessment and plan.  I was present for the entire minor procedure and examination.    Dilia Marin MD    Department of Dermatology  [facility]: Phone: [phone], Fax: [phone]  7/20/2023     ___________________________________________      CC: Derm Problem (Here for ILK injections. )      HPI:  Ms. Maria C Goodman is a(n) 68 year old female who presents to clinic today for alopecia areata follow up.    Overall doing well, here for ILK.  Notes some nail changes  Uses fluocinolone oil 2x monthly  Still has some scalp itch  Not using rogaine regularly     Physical exam:  General: in no acute distress, well-developed, well-nourished  Skin: mild thinning on the vertex scalp and frontal scalp, no discrete alopecic patches, no erythema or scale, mild ridging on fingernails    Medications:  Current Outpatient Medications   Medication    ALBUTEROL IN    azelastine (ASTELIN) 0.1 % nasal spray    Calcium Carbonate-Vitamin D (CALCIUM + D PO)    cetirizine (ZYRTEC) 10 MG tablet    Cholecalciferol (VITAMIN D) 1000 UNITS capsule    clobetasol (TEMOVATE) 0.05 % external ointment    clobetasol propionate (CLOBEX) 0.05 % external shampoo    escitalopram (LEXAPRO) 10 MG tablet    EXEMESTANE PO    famotidine (PEPCID) 10 MG tablet    ferrous gluconate (FERGON) 324 (38 FE) MG tablet    Fluocinolone Acetonide Scalp 0.01 % OIL oil    ketoconazole (NIZORAL) 2 % shampoo    levothyroxine (SYNTHROID, LEVOTHROID) 112 MCG tablet    montelukast (SINGULAIR) 10 MG tablet    tacrolimus (PROTOPIC) 0.1 % ointment    tretinoin (RETIN-A)  0.025 % external cream    triamcinolone acetonide (KENALOG) 10 MG/ML injection    VITAMIN D PO    omalizumab (XOLAIR) 150 MG injection     Current Facility-Administered Medications   Medication    betamethasone acet & sod phos (CELESTONE) injection 6 mg    NONFORMULARY    triamcinolone acetonide (KENALOG-10) injection 10 mg    triamcinolone acetonide (KENALOG-10) injection 10 mg    triamcinolone acetonide (KENALOG-10) injection 10 mg    triamcinolone acetonide (KENALOG-10) injection 10 mg    triamcinolone acetonide (KENALOG-10) injection 20 mg    triamcinolone acetonide (KENALOG-10) injection 20 mg    triamcinolone acetonide (KENALOG-10) injection 20 mg    triamcinolone acetonide (KENALOG-10) injection 20 mg    triamcinolone acetonide (KENALOG-10) injection 24 mg    triamcinolone acetonide (KENALOG-10) injection 30 mg    triamcinolone acetonide (KENALOG-10) injection 30 mg      Past Medical History:   Patient Active Problem List   Diagnosis    Alopecia areata    Dermatitis    Hypertrichosis    Urticaria    Autoimmune disease (H)    Dermatitis, seborrheic     Past Medical History:   Diagnosis Date    Asthma     Hypothyroidism     Lobular breast cancer (H)     s/p chemotherapy and radiation, in remission    Multiple allergies        CC Damaris Mckeon  [facility]  [address] [place] [address] on close of this encounter.

## 2023-07-12 NOTE — PROGRESS NOTES
[facility] Dermatology Note   Encounter Date: Jul 12, 2023  Office visit    Dermatology Problem List:  1. Alopecia areata  - Current tx:  ILK, Clobex shampoo (1-2x a week), H&S shampoo, fluocinolone oil (less since June 2x a month), fluocinonide solution, Rogaine foam (5x a week), Allegra 180 mg morning, cetirizine at night  - Prior: clindamycin lotn PRN  - s/p ILK most recently on 4/12/21, 1/12/21, 10/22/2021, 1/31/22, 6/13/2022, 12/5/2022, 4/11/23, 5/10/2023  - HairMetrix 9/15/20, 4/12/21, 1/31/22, 6/13/22  2. Psoriasis, managed by [facility] dermatologist, Dr. Joshi  - Clobetasol ointment   3. Joint pain/pos JESSICA   - rheum 12/2021. No concern for inflammatory arthritis, will follow-up at [facility] 11/2022  4. Enjoys bird watching    ___________________________________________    Assessment & Plan:  # Alopecia areata- chronic, active - continues to respond to ILK, will add lidex solution for intermittent scalp itch since oil is difficult to use  - See procedure note below  - Start lidex solution 1-2 times daily  - Continue with clobetasol shampoo  - Continue dermasmoothe oil 1x weekly    - Continue Rogaine at least 5x weekly   - Future: consider PO minoxidil if interested    # Ridging of fingernails.  - Advised could be onychorrhexis and related to normal nail aging; could also have component of trachyonychia r/t AA  - Can trial amlactin if desired      Procedures Performed:  - Kenalog intralesional injection procedure note: after verbal consent and discussion of risks including but not limited to atrophy, pain, and bruising, time out was performed, patient positioned, area cleaned with alcohol, 2.0 ml Kenalog 10 mg/ml injected into approx 20 sites on scalp; patient tolerated procedure well    Follow-up: 6-8 weeks for ILK with me, periodic visits with Dr. Brown      Resident: Boris Pleitez MD    Staff Physician Comments:   I saw and evaluated the patient with the resident and I agree with the  assessment and plan.  I was present for the entire minor procedure and examination.    Dilia Marin MD    Department of Dermatology  [facility]: Phone: [phone], Fax: [phone]  7/20/2023     ___________________________________________      CC: Derm Problem (Here for ILK injections. )      HPI:  Ms. Maria C Goodman is a(n) 68 year old female who presents to clinic today for alopecia areata follow up.    Overall doing well, here for ILK.  Notes some nail changes  Uses fluocinolone oil 2x monthly  Still has some scalp itch  Not using rogaine regularly     Physical exam:  General: in no acute distress, well-developed, well-nourished  Skin: mild thinning on the vertex scalp and frontal scalp, no discrete alopecic patches, no erythema or scale, mild ridging on fingernails    Medications:  Current Outpatient Medications   Medication     ALBUTEROL IN     azelastine (ASTELIN) 0.1 % nasal spray     Calcium Carbonate-Vitamin D (CALCIUM + D PO)     cetirizine (ZYRTEC) 10 MG tablet     Cholecalciferol (VITAMIN D) 1000 UNITS capsule     clobetasol (TEMOVATE) 0.05 % external ointment     clobetasol propionate (CLOBEX) 0.05 % external shampoo     escitalopram (LEXAPRO) 10 MG tablet     EXEMESTANE PO     famotidine (PEPCID) 10 MG tablet     ferrous gluconate (FERGON) 324 (38 FE) MG tablet     Fluocinolone Acetonide Scalp 0.01 % OIL oil     ketoconazole (NIZORAL) 2 % shampoo     levothyroxine (SYNTHROID, LEVOTHROID) 112 MCG tablet     montelukast (SINGULAIR) 10 MG tablet     tacrolimus (PROTOPIC) 0.1 % ointment     tretinoin (RETIN-A) 0.025 % external cream     triamcinolone acetonide (KENALOG) 10 MG/ML injection     VITAMIN D PO     omalizumab (XOLAIR) 150 MG injection     Current Facility-Administered Medications   Medication     betamethasone acet & sod phos (CELESTONE) injection 6 mg     NONFORMULARY     triamcinolone acetonide  (KENALOG-10) injection 10 mg     triamcinolone acetonide (KENALOG-10) injection 10 mg     triamcinolone acetonide (KENALOG-10) injection 10 mg     triamcinolone acetonide (KENALOG-10) injection 10 mg     triamcinolone acetonide (KENALOG-10) injection 20 mg     triamcinolone acetonide (KENALOG-10) injection 20 mg     triamcinolone acetonide (KENALOG-10) injection 20 mg     triamcinolone acetonide (KENALOG-10) injection 20 mg     triamcinolone acetonide (KENALOG-10) injection 24 mg     triamcinolone acetonide (KENALOG-10) injection 30 mg     triamcinolone acetonide (KENALOG-10) injection 30 mg      Past Medical History:   Patient Active Problem List   Diagnosis     Alopecia areata     Dermatitis     Hypertrichosis     Urticaria     Autoimmune disease (H)     Dermatitis, seborrheic     Past Medical History:   Diagnosis Date     Asthma      Hypothyroidism      Lobular breast cancer (H)     s/p chemotherapy and radiation, in remission     Multiple allergies        CC Damaris Mckeon  [facility]  [address] [place] [address] on close of this encounter.

## 2023-07-12 NOTE — PATIENT INSTRUCTIONS
- Use fluocinonide solution 1-2 times daily as needed for itch, otherwise use it once a week for maintenance  - Use 1 can of rogaine per month is the goal, try to do a double dose (two capfuls) once per day at night

## 2023-07-12 NOTE — NURSING NOTE
Drug Administration Record    Prior to injection, verified patient identity using patient's name and date of birth.  Due to injection administration, patient instructed to remain in clinic for 15 minutes  afterwards, and to report any adverse reaction to me immediately.    Drug Name: triamcinolone acetonide(kenalog)  Dose: 2mL of triamcinolone 10mg/mL, 20mg dose  Route administered: ID  NDC #: Kenalog-10 (5735-6618-51)  Amount of waste(mL):3ml  Reason for waste: Multi dose vial    LOT #: [id number]  SITE: see provider note  : [employer]  EXPIRATION DATE: 9/2025

## 2023-08-08 ENCOUNTER — OFFICE VISIT (OUTPATIENT)
Dept: DERMATOLOGY | Facility: CLINIC | Age: 68
End: 2023-08-08
Payer: COMMERCIAL

## 2023-08-08 DIAGNOSIS — L63.9 ALOPECIA AREATA: Primary | ICD-10-CM

## 2023-08-08 PROCEDURE — 11901 INJECT SKIN LESIONS >7: CPT | Performed by: DERMATOLOGY

## 2023-08-08 ASSESSMENT — PAIN SCALES - GENERAL: PAINLEVEL: NO PAIN (0)

## 2023-08-08 NOTE — PROGRESS NOTES
[facility] Dermatology Note   Encounter Date: Aug 8, 2023  Office visit    Dermatology Problem List:  1. Alopecia areata  - Current tx:  ILK, Clobex shampoo (1-2x a week), H&S shampoo, fluocinolone oil (less since June 2x a month), fluocinonide solution, Rogaine foam (5x a week), Allegra 180 mg morning, cetirizine at night  - Prior: clindamycin lotn PRN  - s/p ILK most recently on 4/12/21, 1/12/21, 10/22/2021, 1/31/22, 6/13/2022, 12/5/2022, 4/11/23, 5/10/2023  - HairMetrix 9/15/20, 4/12/21, 1/31/22, 6/13/22  2. Psoriasis, managed by [facility] dermatologist, Dr. Joshi  - Clobetasol ointment   3. Joint pain/pos JESSICA   - rheum 12/2021. No concern for inflammatory arthritis, will follow-up at [facility] 11/2022  4. Enjoys bird watching    ___________________________________________    Assessment & Plan:  # Alopecia areata- chronic, active.  - See procedure note below  - Continue lidex solution 1-2 times daily  - Continue with clobetasol shampoo  - Continue dermasmoothe oil 1x weekly    - Continue Rogaine at least 5x weekly   - Future: consider PO minoxidil if interested; discussed today 8/8/23 and not interested     Procedures Performed:  - Kenalog intralesional injection procedure note: after verbal consent and discussion of risks including but not limited to atrophy, pain, and bruising, time out was performed, patient positioned, area cleaned with alcohol, 1.7 ml Kenalog 10 mg/ml injected into approx 20 sites on scalp; patient tolerated procedure well    Follow-up: 6-8 weeks for ILK with me, periodic visits with Dr. Brown    Staff:    Dilia Marin MD    Department of Dermatology  [facility]: Phone: [phone], Fax: [phone]  8/8/2023      ___________________________________________      CC: Derm Problem (Patient reports improvement since their last visit. The patient reports more consistent use of  rogaine. The patient reports thinness of hair on the frontal hairline. The patient would like additional treatment with kenalog injections. )      HPI:  Ms. Maria C Goodman is a(n) 68 year old female who presents to clinic today for alopecia areata follow up.    Ovreall doing okay  Trying to find better way to make rogaine in routine  Had 2.5 good weeks of using      Physical exam:  General: in no acute distress, well-developed, well-nourished  Skin: mild thinning on the vertex scalp and frontal scalp, no discrete alopecic patches, no erythema or scale    Medications:  Current Outpatient Medications   Medication    ALBUTEROL IN    azelastine (ASTELIN) 0.1 % nasal spray    Calcium Carbonate-Vitamin D (CALCIUM + D PO)    cetirizine (ZYRTEC) 10 MG tablet    Cholecalciferol (VITAMIN D) 1000 UNITS capsule    clobetasol (TEMOVATE) 0.05 % external ointment    clobetasol propionate (CLOBEX) 0.05 % external shampoo    escitalopram (LEXAPRO) 10 MG tablet    famotidine (PEPCID) 10 MG tablet    ferrous gluconate (FERGON) 324 (38 FE) MG tablet    Fluocinolone Acetonide Scalp 0.01 % OIL oil    fluocinonide (LIDEX) 0.05 % external solution    ketoconazole (NIZORAL) 2 % shampoo    levothyroxine (SYNTHROID, LEVOTHROID) 112 MCG tablet    montelukast (SINGULAIR) 10 MG tablet    omalizumab (XOLAIR) 150 MG injection    tacrolimus (PROTOPIC) 0.1 % ointment    tretinoin (RETIN-A) 0.025 % external cream    triamcinolone acetonide (KENALOG) 10 MG/ML injection    VITAMIN D PO     Current Facility-Administered Medications   Medication    betamethasone acet & sod phos (CELESTONE) injection 6 mg    NONFORMULARY    triamcinolone acetonide (KENALOG-10) injection 10 mg    triamcinolone acetonide (KENALOG-10) injection 10 mg    triamcinolone acetonide (KENALOG-10) injection 10 mg    triamcinolone acetonide (KENALOG-10) injection 10 mg    triamcinolone acetonide (KENALOG-10) injection 20 mg    triamcinolone acetonide (KENALOG-10) injection 20 mg     triamcinolone acetonide (KENALOG-10) injection 20 mg    triamcinolone acetonide (KENALOG-10) injection 20 mg    triamcinolone acetonide (KENALOG-10) injection 24 mg    triamcinolone acetonide (KENALOG-10) injection 30 mg    triamcinolone acetonide (KENALOG-10) injection 30 mg      Past Medical History:   Patient Active Problem List   Diagnosis    Alopecia areata    Dermatitis    Hypertrichosis    Urticaria    Autoimmune disease (H)    Dermatitis, seborrheic     Past Medical History:   Diagnosis Date    Asthma     Hypothyroidism     Lobular breast cancer (H)     s/p chemotherapy and radiation, in remission    Multiple allergies        CC Damaris Mckeon  [facility]  [address] [place] [address] on close of this encounter.

## 2023-08-08 NOTE — LETTER
8/8/2023       RE: Maria C Goodman  [address] [place] [address]     Dear Colleague,    Thank you for referring your patient, Maria C Goodman, to the [facility] [place] at [facility]. Please see a copy of my visit note below.    [facility] Dermatology Note   Encounter Date: Aug 8, 2023  Office visit    Dermatology Problem List:  1. Alopecia areata  - Current tx:  ILK, Clobex shampoo (1-2x a week), H&S shampoo, fluocinolone oil (less since June 2x a month), fluocinonide solution, Rogaine foam (5x a week), Allegra 180 mg morning, cetirizine at night  - Prior: clindamycin lotn PRN  - s/p ILK most recently on 4/12/21, 1/12/21, 10/22/2021, 1/31/22, 6/13/2022, 12/5/2022, 4/11/23, 5/10/2023  - HairMetrix 9/15/20, 4/12/21, 1/31/22, 6/13/22  2. Psoriasis, managed by [facility] dermatologist, Dr. Joshi  - Clobetasol ointment   3. Joint pain/pos JESSICA   - rheum 12/2021. No concern for inflammatory arthritis, will follow-up at [facility] 11/2022  4. Enjoys bird watching    ___________________________________________    Assessment & Plan:  # Alopecia areata- chronic, active.  - See procedure note below  - Continue lidex solution 1-2 times daily  - Continue with clobetasol shampoo  - Continue dermasmoothe oil 1x weekly    - Continue Rogaine at least 5x weekly   - Future: consider PO minoxidil if interested; discussed today 8/8/23 and not interested     Procedures Performed:  - Kenalog intralesional injection procedure note: after verbal consent and discussion of risks including but not limited to atrophy, pain, and bruising, time out was performed, patient positioned, area cleaned with alcohol, 1.7 ml Kenalog 10 mg/ml injected into approx 20 sites on scalp; patient tolerated procedure well    Follow-up: 6-8 weeks for ILK with me, periodic visits with Dr. Brown    Staff:    Dilia Marin MD    Department  of Dermatology  [facility]: Phone: [phone], Fax: [phone]  8/8/2023      ___________________________________________      CC: Derm Problem (Patient reports improvement since their last visit. The patient reports more consistent use of rogaine. The patient reports thinness of hair on the frontal hairline. The patient would like additional treatment with kenalog injections. )      HPI:  Ms. Maria C Goodman is a(n) 68 year old female who presents to clinic today for alopecia areata follow up.    Ovreall doing okay  Trying to find better way to make rogaine in routine  Had 2.5 good weeks of using      Physical exam:  General: in no acute distress, well-developed, well-nourished  Skin: mild thinning on the vertex scalp and frontal scalp, no discrete alopecic patches, no erythema or scale    Medications:  Current Outpatient Medications   Medication    ALBUTEROL IN    azelastine (ASTELIN) 0.1 % nasal spray    Calcium Carbonate-Vitamin D (CALCIUM + D PO)    cetirizine (ZYRTEC) 10 MG tablet    Cholecalciferol (VITAMIN D) 1000 UNITS capsule    clobetasol (TEMOVATE) 0.05 % external ointment    clobetasol propionate (CLOBEX) 0.05 % external shampoo    escitalopram (LEXAPRO) 10 MG tablet    famotidine (PEPCID) 10 MG tablet    ferrous gluconate (FERGON) 324 (38 FE) MG tablet    Fluocinolone Acetonide Scalp 0.01 % OIL oil    fluocinonide (LIDEX) 0.05 % external solution    ketoconazole (NIZORAL) 2 % shampoo    levothyroxine (SYNTHROID, LEVOTHROID) 112 MCG tablet    montelukast (SINGULAIR) 10 MG tablet    omalizumab (XOLAIR) 150 MG injection    tacrolimus (PROTOPIC) 0.1 % ointment    tretinoin (RETIN-A) 0.025 % external cream    triamcinolone acetonide (KENALOG) 10 MG/ML injection    VITAMIN D PO     Current Facility-Administered Medications   Medication    betamethasone acet & sod phos (CELESTONE) injection 6 mg    NONFORMULARY    triamcinolone  acetonide (KENALOG-10) injection 10 mg    triamcinolone acetonide (KENALOG-10) injection 10 mg    triamcinolone acetonide (KENALOG-10) injection 10 mg    triamcinolone acetonide (KENALOG-10) injection 10 mg    triamcinolone acetonide (KENALOG-10) injection 20 mg    triamcinolone acetonide (KENALOG-10) injection 20 mg    triamcinolone acetonide (KENALOG-10) injection 20 mg    triamcinolone acetonide (KENALOG-10) injection 20 mg    triamcinolone acetonide (KENALOG-10) injection 24 mg    triamcinolone acetonide (KENALOG-10) injection 30 mg    triamcinolone acetonide (KENALOG-10) injection 30 mg      Past Medical History:   Patient Active Problem List   Diagnosis    Alopecia areata    Dermatitis    Hypertrichosis    Urticaria    Autoimmune disease (H)    Dermatitis, seborrheic     Past Medical History:   Diagnosis Date    Asthma     Hypothyroidism     Lobular breast cancer (H)     s/p chemotherapy and radiation, in remission    Multiple allergies        CC Damaris Mckeon  [facility]  [address] [place] [address] on close of this encounter.

## 2023-08-08 NOTE — NURSING NOTE
Drug Administration Record    Prior to injection, verified patient identity using patient's name and date of birth.  Due to injection administration, patient instructed to remain in clinic for 15 minutes  afterwards, and to report any adverse reaction to me immediately.    Drug Name: triamcinolone acetonide(kenalog)  Dose: 2mL of triamcinolone 10mg/mL, 20mg dose  Route administered: ID  NDC #: Kenalog-10 (1079-3108-38)  Amount of waste(mL):3ml  Reason for waste: Multi dose vial    LOT #: [id number]  SITE: see proivder note  : [employer]  EXPIRATION DATE: 9/2025

## 2023-08-08 NOTE — NURSING NOTE
Dermatology Rooming Note    Maria C Goodman's goals for this visit include:   Chief Complaint   Patient presents with    Derm Problem     Patient reports improvement since their last visit. The patient reports more consistent use of rogaine. The patient reports thinness of hair on the frontal hairline. The patient would like additional treatment with kenalog injections.      Yolanda Ma LPN

## 2023-08-08 NOTE — PROGRESS NOTES
Evan doing okay  Trying to find better way to make rogaine in routine    Had 2.5 good weeks    Ilk 1.7 ml

## 2023-09-05 ENCOUNTER — OFFICE VISIT (OUTPATIENT)
Dept: DERMATOLOGY | Facility: CLINIC | Age: 68
End: 2023-09-05
Payer: COMMERCIAL

## 2023-09-05 DIAGNOSIS — L63.9 ALOPECIA AREATA: Primary | ICD-10-CM

## 2023-09-05 PROCEDURE — 11901 INJECT SKIN LESIONS >7: CPT | Performed by: DERMATOLOGY

## 2023-09-05 ASSESSMENT — PAIN SCALES - GENERAL: PAINLEVEL: NO PAIN (0)

## 2023-09-05 NOTE — NURSING NOTE
Drug Administration Record    Prior to injection, verified patient identity using patient's name and date of birth.  Due to injection administration, patient instructed to remain in clinic for 15 minutes  afterwards, and to report any adverse reaction to me immediately.    Drug Name: triamcinolone acetonide(kenalog)  Dose: 2mL of triamcinolone 10mg/mL, 20mg dose  Route administered: ID  NDC #: Kenalog-10 (6671-5807-29)  Amount of waste(mL):3ml  Reason for waste: Multi dose vial    LOT #: [id number]  SITE: see provider note  : [employer]  EXPIRATION DATE: 10/2025

## 2023-09-05 NOTE — NURSING NOTE
Dermatology Rooming Note    Maria C Goodman's goals for this visit include:   Chief Complaint   Patient presents with    Derm Problem     Here today for ILK injections.      Sidra Campuzano RN

## 2023-09-05 NOTE — LETTER
9/5/2023       RE: Maria C Goodman  [address] [place] [address]     Dear Colleague,    Thank you for referring your patient, Maria C Goodman, to the [facility]ERMATOLOGY [facility] [place] at [facility]. Please see a copy of my visit note below.    [facility] Dermatology Note   Encounter Date: Sep 5, 2023  Office visit    Dermatology Problem List:  1. Alopecia areata  - Current tx:  ILK, Clobex shampoo (1-2x a week), H&S shampoo, fluocinolone oil (less since June 2x a month), fluocinonide solution, Rogaine foam (5x a week), Allegra 180 mg morning, cetirizine at night  - Prior: clindamycin lotn PRN  - s/p ILK most recently on 4/12/21, 1/12/21, 10/22/2021, 1/31/22, 6/13/2022, 12/5/2022, 4/11/23, 5/10/2023  - HairMetrix 9/15/20, 4/12/21, 1/31/22, 6/13/22  2. Psoriasis, managed by [facility], Dr. Joshi  - Clobetasol ointment   3. Joint pain/pos JESSICA   - rheum 12/2021. No concern for inflammatory arthritis, will follow-up at [facility] 11/2022  4. Enjoys bird watching    ___________________________________________    Assessment & Plan:  # Alopecia areata- chronic, active. She's been able to be more consistent with her Rogaine and overall doing well.  - ILK today  - Continue lidex solution, advised could use this or dermasmoothe oil 3x weekly a/w clobetasol as below  - Continue with clobetasol shampoo 3x weekly  - Continue Rogaine at least 5x weekly, she will try two applications 5 min apart and see if this helps with more consistency   - Future: consider PO minoxidil if interested, not currently interested at this time     Procedures Performed:  - Kenalog intralesional injection procedure note: after verbal consent and discussion of risks including but not limited to atrophy, pain, and bruising, time out was performed, patient positioned, area cleaned with alcohol, 1.4 ml Kenalog 10 mg/ml injected into approx 20 sites on  scalp; patient tolerated procedure well    Follow-up: 6-8 weeks for ILK with me, periodic visits with Dr. Brown    Staff:    Dilia Marin MD    Department of Dermatology  [facility]: Phone: [phone], Fax: [phone]  9/5/2023       ___________________________________________      CC: Derm Problem (Here today for ILK injections. )      HPI:  Ms. Maria C Goodman is a(n) 68 year old female who presents to clinic today for alopecia areata follow up.    Doing pretty good with minoxidil - maybe 10/14 times in 1 week  Not doing the oil recently  Have done lidex once week  Doing the shampoo 3x/week    Physical exam:  General: in no acute distress, well-developed, well-nourished  Skin: mild thinning on the vertex scalp and frontal scalp, no discrete alopecic patches, no erythema or scale    Medications:  Current Outpatient Medications   Medication    ALBUTEROL IN    azelastine (ASTELIN) 0.1 % nasal spray    Calcium Carbonate-Vitamin D (CALCIUM + D PO)    cetirizine (ZYRTEC) 10 MG tablet    Cholecalciferol (VITAMIN D) 1000 UNITS capsule    clobetasol (TEMOVATE) 0.05 % external ointment    clobetasol propionate (CLOBEX) 0.05 % external shampoo    escitalopram (LEXAPRO) 10 MG tablet    famotidine (PEPCID) 10 MG tablet    ferrous gluconate (FERGON) 324 (38 FE) MG tablet    Fluocinolone Acetonide Scalp 0.01 % OIL oil    fluocinonide (LIDEX) 0.05 % external solution    ketoconazole (NIZORAL) 2 % shampoo    levothyroxine (SYNTHROID, LEVOTHROID) 112 MCG tablet    montelukast (SINGULAIR) 10 MG tablet    tacrolimus (PROTOPIC) 0.1 % ointment    tretinoin (RETIN-A) 0.025 % external cream    triamcinolone acetonide (KENALOG) 10 MG/ML injection    VITAMIN D PO    omalizumab (XOLAIR) 150 MG injection     Current Facility-Administered Medications   Medication    betamethasone acet & sod phos (CELESTONE) injection 6 mg    NONFORMULARY     triamcinolone acetonide (KENALOG-10) injection 10 mg    triamcinolone acetonide (KENALOG-10) injection 10 mg    triamcinolone acetonide (KENALOG-10) injection 10 mg    triamcinolone acetonide (KENALOG-10) injection 10 mg    triamcinolone acetonide (KENALOG-10) injection 20 mg    triamcinolone acetonide (KENALOG-10) injection 20 mg    triamcinolone acetonide (KENALOG-10) injection 20 mg    triamcinolone acetonide (KENALOG-10) injection 20 mg    triamcinolone acetonide (KENALOG-10) injection 24 mg    triamcinolone acetonide (KENALOG-10) injection 30 mg    triamcinolone acetonide (KENALOG-10) injection 30 mg      Past Medical History:   Patient Active Problem List   Diagnosis    Alopecia areata    Dermatitis    Hypertrichosis    Urticaria    Autoimmune disease (H)    Dermatitis, seborrheic     Past Medical History:   Diagnosis Date    Asthma     Hypothyroidism     Lobular breast cancer (H)     s/p chemotherapy and radiation, in remission    Multiple allergies        CC Damaris Mckeon  [facility]  [address] [place] [address] on close of this encounter.

## 2023-09-05 NOTE — PROGRESS NOTES
[facility] Dermatology Note   Encounter Date: Sep 5, 2023  Office visit    Dermatology Problem List:  1. Alopecia areata  - Current tx:  ILK, Clobex shampoo (1-2x a week), H&S shampoo, fluocinolone oil (less since June 2x a month), fluocinonide solution, Rogaine foam (5x a week), Allegra 180 mg morning, cetirizine at night  - Prior: clindamycin lotn PRN  - s/p ILK most recently on 4/12/21, 1/12/21, 10/22/2021, 1/31/22, 6/13/2022, 12/5/2022, 4/11/23, 5/10/2023  - HairMetrix 9/15/20, 4/12/21, 1/31/22, 6/13/22  2. Psoriasis, managed by [facility], Dr. Joshi  - Clobetasol ointment   3. Joint pain/pos JESSICA   - rheum 12/2021. No concern for inflammatory arthritis, will follow-up at [facility] 11/2022  4. Enjoys bird watching    ___________________________________________    Assessment & Plan:  # Alopecia areata- chronic, active. She's been able to be more consistent with her Rogaine and overall doing well.  - ILK today  - Continue lidex solution, advised could use this or dermasmoothe oil 3x weekly a/w clobetasol as below  - Continue with clobetasol shampoo 3x weekly  - Continue Rogaine at least 5x weekly, she will try two applications 5 min apart and see if this helps with more consistency   - Future: consider PO minoxidil if interested, not currently interested at this time     Procedures Performed:  - Kenalog intralesional injection procedure note: after verbal consent and discussion of risks including but not limited to atrophy, pain, and bruising, time out was performed, patient positioned, area cleaned with alcohol, 1.4 ml Kenalog 10 mg/ml injected into approx 20 sites on scalp; patient tolerated procedure well    Follow-up: 6-8 weeks for ILK with me, periodic visits with Dr. Brown    Staff:    Dilia Marin MD    Department of Dermatology  [facility]: Phone: [phone], Fax:  [phone]  9/5/2023       ___________________________________________      CC: Derm Problem (Here today for ILK injections. )      HPI:  Ms. Maria C Goodman is a(n) 68 year old female who presents to clinic today for alopecia areata follow up.    Doing pretty good with minoxidil - maybe 10/14 times in 1 week  Not doing the oil recently  Have done lidex once week  Doing the shampoo 3x/week    Physical exam:  General: in no acute distress, well-developed, well-nourished  Skin: mild thinning on the vertex scalp and frontal scalp, no discrete alopecic patches, no erythema or scale    Medications:  Current Outpatient Medications   Medication    ALBUTEROL IN    azelastine (ASTELIN) 0.1 % nasal spray    Calcium Carbonate-Vitamin D (CALCIUM + D PO)    cetirizine (ZYRTEC) 10 MG tablet    Cholecalciferol (VITAMIN D) 1000 UNITS capsule    clobetasol (TEMOVATE) 0.05 % external ointment    clobetasol propionate (CLOBEX) 0.05 % external shampoo    escitalopram (LEXAPRO) 10 MG tablet    famotidine (PEPCID) 10 MG tablet    ferrous gluconate (FERGON) 324 (38 FE) MG tablet    Fluocinolone Acetonide Scalp 0.01 % OIL oil    fluocinonide (LIDEX) 0.05 % external solution    ketoconazole (NIZORAL) 2 % shampoo    levothyroxine (SYNTHROID, LEVOTHROID) 112 MCG tablet    montelukast (SINGULAIR) 10 MG tablet    tacrolimus (PROTOPIC) 0.1 % ointment    tretinoin (RETIN-A) 0.025 % external cream    triamcinolone acetonide (KENALOG) 10 MG/ML injection    VITAMIN D PO    omalizumab (XOLAIR) 150 MG injection     Current Facility-Administered Medications   Medication    betamethasone acet & sod phos (CELESTONE) injection 6 mg    NONFORMULARY    triamcinolone acetonide (KENALOG-10) injection 10 mg    triamcinolone acetonide (KENALOG-10) injection 10 mg    triamcinolone acetonide (KENALOG-10) injection 10 mg    triamcinolone acetonide (KENALOG-10) injection 10 mg    triamcinolone acetonide (KENALOG-10) injection 20 mg    triamcinolone acetonide  (KENALOG-10) injection 20 mg    triamcinolone acetonide (KENALOG-10) injection 20 mg    triamcinolone acetonide (KENALOG-10) injection 20 mg    triamcinolone acetonide (KENALOG-10) injection 24 mg    triamcinolone acetonide (KENALOG-10) injection 30 mg    triamcinolone acetonide (KENALOG-10) injection 30 mg      Past Medical History:   Patient Active Problem List   Diagnosis    Alopecia areata    Dermatitis    Hypertrichosis    Urticaria    Autoimmune disease (H)    Dermatitis, seborrheic     Past Medical History:   Diagnosis Date    Asthma     Hypothyroidism     Lobular breast cancer (H)     s/p chemotherapy and radiation, in remission    Multiple allergies        CC Damarsi Mckeon  [facility]  [address] [place] [address] on close of this encounter.

## 2023-10-25 ENCOUNTER — OFFICE VISIT (OUTPATIENT)
Dept: DERMATOLOGY | Facility: CLINIC | Age: 68
End: 2023-10-25
Payer: COMMERCIAL

## 2023-10-25 DIAGNOSIS — L63.9 ALOPECIA AREATA: Primary | ICD-10-CM

## 2023-10-25 PROCEDURE — 11901 INJECT SKIN LESIONS >7: CPT | Performed by: DERMATOLOGY

## 2023-10-25 ASSESSMENT — PAIN SCALES - GENERAL: PAINLEVEL: NO PAIN (0)

## 2023-10-25 NOTE — NURSING NOTE
Drug Administration Record    Prior to injection, verified patient identity using patient's name and date of birth.  Due to injection administration, patient instructed to remain in clinic for 15 minutes  afterwards, and to report any adverse reaction to me immediately.    Drug Name: triamcinolone acetonide(kenalog)  Dose: 2mL of triamcinolone 10mg/mL, 20mg dose  Route administered: ID  NDC #: Kenalog-10 (7177-5719-32)  Amount of waste(mL):3ml  Reason for waste: Multi dose vial    LOT #: [id number]  SITE: see provider note  : [employer]  EXPIRATION DATE: 10/2025

## 2023-10-25 NOTE — LETTER
10/25/2023       RE: Maria C Goodman  [address] [place] [address]     Dear Colleague,    Thank you for referring your patient, Maria C Goodman, to the [facility] DERMATOLOGY CLINIC [place] at [facility]. Please see a copy of my visit note below.    [facility] Dermatology Note  Encounter Date: Oct 25, 2023  Office Visit     Dermatology Problem List:  1. Alopecia areata  - Current tx:  ILK, Clobex shampoo (1-2x a week), H&S shampoo, fluocinolone oil (less since June 2x a month), fluocinonide solution, Rogaine foam (5x a week), Allegra 180 mg morning, cetirizine at night  - Prior: clindamycin lotn PRN  - s/p ILK most recently on 4/12/21, 1/12/21, 10/22/2021, 1/31/22, 6/13/2022, 12/5/2022, 4/11/23, 5/10/2023  - HairMetrix 9/15/20, 4/12/21, 1/31/22, 6/13/22  2. Psoriasis, managed by [facility]ologist, Dr. Joshi  - Clobetasol ointment   3. Joint pain/pos JESSICA, anti-centromere  - follows with outside rheum       SHx: Enjoys bird watching    ____________________________________________    Assessment & Plan:    # Alopecia areata- chronic, active, recently flaring, was traveling a bit and hard to be consistent with topicals.  - ILK today  - Continue lidex solution or dermasmoothe oil 3x weekly a/w clobetasol as below  - Continue with clobetasol shampoo 3x weekly  - Continue Rogaine at least 5x weekly  - Has follow up with Dr. Brown in December, will complete hair metrix at that time.   - Future: consider PO minoxidil if interested, not currently interested at this time      Procedures Performed:   - Intra-lesional triamcinolone procedure note. After verbal consent and review of risk of pain and skin thinning/atrophy, positioning and cleansing with isopropyl alcohol, 2 total mL of triamcinolone 10 mg/mL was injected into  20 lesion(s) on the scalp. The patient tolerated the procedure well and left the dermatology clinic in good  condition.    Follow-up: 3 month(s) in-person, or earlier for new or changing lesions    Staff and Scribe:     I, Viridiana Lieberman, am serving as a scribe to document services personally performed by Dr. Dilia Marin, based on data collection and the provider's statements to me.     Provider Disclosure:   The documentation recorded by the scribe accurately reflects the services I personally performed and the decisions made by me.    Dilia Marin MD    Department of Dermatology  [facility]: Phone: [phone], Fax: [phone]  11/1/2023     ____________________________________________    CC: Derm Problem (Here today for ilk injections. )    HPI:  Ms. Maria C Goodman is a(n) 68 year old female who presents today as a return patient for Alopecia areata.    The patient feels her hair loss has been unstable. Has noticed more hair coming out when she chao her hair.    She has not been consistent with Rogaine use, due to traveling for work.    Patient denies any stress in the last few months. Denies having any virus.    Patient is otherwise feeling well, without additional skin concerns.    Labs Reviewed:  N/A    Physical Exam:  Vitals: There were no vitals taken for this visit.  SKIN: Focused examination of the scalp was performed.  - Mild thinning on the vertex scalp and frontal scalp, no discrete alopecic patches, no erythema or scale.  - Compared to prior, decreased density along hairline.   - No other lesions of concern on areas examined.     Medications:  Current Outpatient Medications   Medication    ALBUTEROL IN    azelastine (ASTELIN) 0.1 % nasal spray    Calcium Carbonate-Vitamin D (CALCIUM + D PO)    cetirizine (ZYRTEC) 10 MG tablet    Cholecalciferol (VITAMIN D) 1000 UNITS capsule    clobetasol (TEMOVATE) 0.05 % external ointment    clobetasol propionate (CLOBEX) 0.05 % external shampoo    escitalopram  (LEXAPRO) 10 MG tablet    famotidine (PEPCID) 10 MG tablet    ferrous gluconate (FERGON) 324 (38 FE) MG tablet    Fluocinolone Acetonide Scalp 0.01 % OIL oil    fluocinonide (LIDEX) 0.05 % external solution    ketoconazole (NIZORAL) 2 % shampoo    levothyroxine (SYNTHROID, LEVOTHROID) 112 MCG tablet    montelukast (SINGULAIR) 10 MG tablet    tacrolimus (PROTOPIC) 0.1 % ointment    tretinoin (RETIN-A) 0.025 % external cream    triamcinolone acetonide (KENALOG) 10 MG/ML injection    VITAMIN D PO    omalizumab (XOLAIR) 150 MG injection     Current Facility-Administered Medications   Medication    betamethasone acet & sod phos (CELESTONE) injection 6 mg    NONFORMULARY    triamcinolone acetonide (KENALOG-10) injection 10 mg    triamcinolone acetonide (KENALOG-10) injection 10 mg    triamcinolone acetonide (KENALOG-10) injection 10 mg    triamcinolone acetonide (KENALOG-10) injection 10 mg    triamcinolone acetonide (KENALOG-10) injection 20 mg    triamcinolone acetonide (KENALOG-10) injection 20 mg    triamcinolone acetonide (KENALOG-10) injection 20 mg    triamcinolone acetonide (KENALOG-10) injection 20 mg    triamcinolone acetonide (KENALOG-10) injection 24 mg    triamcinolone acetonide (KENALOG-10) injection 30 mg    triamcinolone acetonide (KENALOG-10) injection 30 mg      Past Medical History:   Patient Active Problem List   Diagnosis    Alopecia areata    Dermatitis    Hypertrichosis    Urticaria    Autoimmune disease (H24)    Dermatitis, seborrheic     Past Medical History:   Diagnosis Date    Asthma     Hypothyroidism     Lobular breast cancer (H)     s/p chemotherapy and radiation, in remission    Multiple allergies         CC No referring provider defined for this encounter. on close of this encounter.

## 2023-11-28 ENCOUNTER — OFFICE VISIT (OUTPATIENT)
Dept: FAMILY MEDICINE | Facility: CLINIC | Age: 68
End: 2023-11-28
Attending: DERMATOLOGY
Payer: COMMERCIAL

## 2023-11-28 DIAGNOSIS — L63.9 ALOPECIA AREATA: Primary | ICD-10-CM

## 2023-11-28 PROCEDURE — 11901 INJECT SKIN LESIONS >7: CPT | Performed by: DERMATOLOGY

## 2023-11-28 NOTE — PROGRESS NOTES
[facility] Dermatology Note  Encounter Date: Nov 28, 2023  Office Visit     Dermatology Problem List:  1. Alopecia areata  - Current tx:  ILK, Clobex shampoo (1-2x a week), H&S shampoo, fluocinolone oil (less since June 2x a month), fluocinonide solution, Rogaine foam (5x a week), Allegra 180 mg morning, cetirizine at night  - Prior: clindamycin lotion PRN  - HairMetrix 9/15/20, 4/12/21, 1/31/22, 6/13/22  2. Psoriasis, managed by [facility], Dr. Joshi  - Clobetasol ointment   3. Joint pain/pos JESSICA, anti-centromere  - follows with outside rheum     ____________________________________________    Assessment & Plan:    # Alopecia areata- chronic, active, recently flaring but now has calmed down and overall stable/improved. The q4 week injections seem to be working for her.  - ILK performed today. See procedure note below.  - Continue lidex solution or dermasmoothe oil 3x weekly a/w clobetasol as below  - Continue clobetasol shampoo 3x weekly  - Continue Rogaine at least 5x weekly  - Has follow up with Dr. Brown in December, will complete hair metrix at that time.   - Future: consider PO minoxidil if interested, not currently interested at this time    Procedures Performed:   - Intra-lesional triamcinolone procedure note. After verbal consent and review of risk of pain and skin thinning/atrophy, positioning and cleansing with isopropyl alcohol, 2 total mL of triamcinolone 10 mg/mL was injected into 20 lesion(s) on the scalp. The patient tolerated the procedure well and left the dermatology clinic in good condition.      Follow-up: 4 week(s) in-person, or earlier for new or changing lesions    Staff and Scribe:     Scribe Disclosure:   ADOLFO EWING, am serving as a scribe; to document services personally performed by Dilia Marin MD -based on data collection and the provider's statements to me.    Provider Disclosure:   The documentation recorded by the scribe accurately  reflects the services I personally performed and the decisions made by me.    Dilia Marin MD    Department of Dermatology  [facility]: Phone: [phone], Fax: [phone]  11/28/2023     ____________________________________________    CC: Derm Problem (Follow up hair loss, ILK injections)    HPI:  Ms. Maria C Goodman is a(n) 68 year old female who presents today as a return patient for Alopecia Areata.    The patient feels that her scalp is doing okay. Denies any shedding.    Patient is otherwise feeling well, without additional skin concerns.    Labs Reviewed:  N/A    Physical Exam:  Vitals: There were no vitals taken for this visit.  SKIN: Focused examination of the scalp was performed.  - Mild thinning on the vertex scalp and frontal scalp, no discrete alopecic patches, no erythema or scale.   - Overall stable from prior (some areas a little improved, others stable).  - No other lesions of concern on areas examined.     Medications:  Current Outpatient Medications   Medication    ALBUTEROL IN    azelastine (ASTELIN) 0.1 % nasal spray    Calcium Carbonate-Vitamin D (CALCIUM + D PO)    cetirizine (ZYRTEC) 10 MG tablet    Cholecalciferol (VITAMIN D) 1000 UNITS capsule    clobetasol (TEMOVATE) 0.05 % external ointment    clobetasol propionate (CLOBEX) 0.05 % external shampoo    escitalopram (LEXAPRO) 10 MG tablet    famotidine (PEPCID) 10 MG tablet    ferrous gluconate (FERGON) 324 (38 FE) MG tablet    Fluocinolone Acetonide Scalp 0.01 % OIL oil    fluocinonide (LIDEX) 0.05 % external solution    ketoconazole (NIZORAL) 2 % shampoo    levothyroxine (SYNTHROID, LEVOTHROID) 112 MCG tablet    montelukast (SINGULAIR) 10 MG tablet    tacrolimus (PROTOPIC) 0.1 % ointment    tretinoin (RETIN-A) 0.025 % external cream    triamcinolone acetonide (KENALOG) 10 MG/ML injection    VITAMIN D PO    omalizumab (XOLAIR) 150 MG injection      Current Facility-Administered Medications   Medication    betamethasone acet & sod phos (CELESTONE) injection 6 mg    NONFORMULARY    triamcinolone acetonide (KENALOG-10) injection 10 mg    triamcinolone acetonide (KENALOG-10) injection 10 mg    triamcinolone acetonide (KENALOG-10) injection 10 mg    triamcinolone acetonide (KENALOG-10) injection 10 mg    triamcinolone acetonide (KENALOG-10) injection 20 mg    triamcinolone acetonide (KENALOG-10) injection 20 mg    triamcinolone acetonide (KENALOG-10) injection 20 mg    triamcinolone acetonide (KENALOG-10) injection 20 mg    triamcinolone acetonide (KENALOG-10) injection 24 mg    triamcinolone acetonide (KENALOG-10) injection 30 mg    triamcinolone acetonide (KENALOG-10) injection 30 mg      Past Medical History:   Patient Active Problem List   Diagnosis    Alopecia areata    Dermatitis    Hypertrichosis    Urticaria    Autoimmune disease (H24)    Dermatitis, seborrheic     Past Medical History:   Diagnosis Date    Asthma     Hypothyroidism     Lobular breast cancer (H)     s/p chemotherapy and radiation, in remission    Multiple allergies         CC Dilia Marin MD   DERMATOLOGY  [address] [place], [place] [id number] [phone] on close of this encounter.

## 2023-11-28 NOTE — LETTER
11/28/2023         RE: Maria C Goodman  [address] [place] [address]        Dear Colleague,    Thank you for referring your patient, Maria C Goodman, to the [facility]. Please see a copy of my visit note below.    [facility] Dermatology Note  Encounter Date: Nov 28, 2023  Office Visit     Dermatology Problem List:  1. Alopecia areata  - Current tx:  ILK, Clobex shampoo (1-2x a week), H&S shampoo, fluocinolone oil (less since June 2x a month), fluocinonide solution, Rogaine foam (5x a week), Allegra 180 mg morning, cetirizine at night  - Prior: clindamycin lotion PRN  - HairMetrix 9/15/20, 4/12/21, 1/31/22, 6/13/22  2. Psoriasis, managed by [facility], Dr. Joshi  - Clobetasol ointment   3. Joint pain/pos JESSICA, anti-centromere  - follows with outside rheum     ____________________________________________    Assessment & Plan:    # Alopecia areata- chronic, active, recently flaring but now has calmed down and overall stable/improved. The q4 week injections seem to be working for her.  - ILK performed today. See procedure note below.  - Continue lidex solution or dermasmoothe oil 3x weekly a/w clobetasol as below  - Continue clobetasol shampoo 3x weekly  - Continue Rogaine at least 5x weekly  - Has follow up with Dr. Brown in December, will complete hair metrix at that time.   - Future: consider PO minoxidil if interested, not currently interested at this time    Procedures Performed:   - Intra-lesional triamcinolone procedure note. After verbal consent and review of risk of pain and skin thinning/atrophy, positioning and cleansing with isopropyl alcohol, 2 total mL of triamcinolone 10 mg/mL was injected into 20 lesion(s) on the scalp. The patient tolerated the procedure well and left the dermatology clinic in good condition.      Follow-up: 4 week(s) in-person, or earlier for new or changing lesions    Staff and Scribe:     Scribe  Disclosure:   I, ADOLFO RAMIREZ, am serving as a scribe; to document services personally performed by Dilia Marin MD -based on data collection and the provider's statements to me.    Provider Disclosure:   The documentation recorded by the scribe accurately reflects the services I personally performed and the decisions made by me.    Dilia Marin MD    Department of Dermatology  [facility]: Phone: [phone], Fax: [phone]  11/28/2023     ____________________________________________    CC: Derm Problem (Follow up hair loss, ILK injections)    HPI:  Ms. Maria C Goodman is a(n) 68 year old female who presents today as a return patient for Alopecia Areata.    The patient feels that her scalp is doing okay. Denies any shedding.    Patient is otherwise feeling well, without additional skin concerns.    Labs Reviewed:  N/A    Physical Exam:  Vitals: There were no vitals taken for this visit.  SKIN: Focused examination of the scalp was performed.  - Mild thinning on the vertex scalp and frontal scalp, no discrete alopecic patches, no erythema or scale.   - Overall stable from prior (some areas a little improved, others stable).  - No other lesions of concern on areas examined.     Medications:  Current Outpatient Medications   Medication     ALBUTEROL IN     azelastine (ASTELIN) 0.1 % nasal spray     Calcium Carbonate-Vitamin D (CALCIUM + D PO)     cetirizine (ZYRTEC) 10 MG tablet     Cholecalciferol (VITAMIN D) 1000 UNITS capsule     clobetasol (TEMOVATE) 0.05 % external ointment     clobetasol propionate (CLOBEX) 0.05 % external shampoo     escitalopram (LEXAPRO) 10 MG tablet     famotidine (PEPCID) 10 MG tablet     ferrous gluconate (FERGON) 324 (38 FE) MG tablet     Fluocinolone Acetonide Scalp 0.01 % OIL oil     fluocinonide (LIDEX) 0.05 % external solution     ketoconazole (NIZORAL) 2 % shampoo      levothyroxine (SYNTHROID, LEVOTHROID) 112 MCG tablet     montelukast (SINGULAIR) 10 MG tablet     tacrolimus (PROTOPIC) 0.1 % ointment     tretinoin (RETIN-A) 0.025 % external cream     triamcinolone acetonide (KENALOG) 10 MG/ML injection     VITAMIN D PO     omalizumab (XOLAIR) 150 MG injection     Current Facility-Administered Medications   Medication     betamethasone acet & sod phos (CELESTONE) injection 6 mg     NONFORMULARY     triamcinolone acetonide (KENALOG-10) injection 10 mg     triamcinolone acetonide (KENALOG-10) injection 10 mg     triamcinolone acetonide (KENALOG-10) injection 10 mg     triamcinolone acetonide (KENALOG-10) injection 10 mg     triamcinolone acetonide (KENALOG-10) injection 20 mg     triamcinolone acetonide (KENALOG-10) injection 20 mg     triamcinolone acetonide (KENALOG-10) injection 20 mg     triamcinolone acetonide (KENALOG-10) injection 20 mg     triamcinolone acetonide (KENALOG-10) injection 24 mg     triamcinolone acetonide (KENALOG-10) injection 30 mg     triamcinolone acetonide (KENALOG-10) injection 30 mg      Past Medical History:   Patient Active Problem List   Diagnosis     Alopecia areata     Dermatitis     Hypertrichosis     Urticaria     Autoimmune disease (H24)     Dermatitis, seborrheic     Past Medical History:   Diagnosis Date     Asthma      Hypothyroidism      Lobular breast cancer (H)     s/p chemotherapy and radiation, in remission     Multiple allergies         CC Dilia Marin MD   DERMATOLOGY  [address] [place] [address] on close of this encounter.      Again, thank you for allowing me to participate in the care of your patient.        Sincerely,        Dilia Marin MD

## 2023-12-19 ENCOUNTER — OFFICE VISIT (OUTPATIENT)
Dept: DERMATOLOGY | Facility: CLINIC | Age: 68
End: 2023-12-19
Payer: COMMERCIAL

## 2023-12-19 VITALS — HEART RATE: 82 BPM | DIASTOLIC BLOOD PRESSURE: 69 MMHG | SYSTOLIC BLOOD PRESSURE: 116 MMHG

## 2023-12-19 DIAGNOSIS — L63.9 ALOPECIA AREATA: Primary | ICD-10-CM

## 2023-12-19 PROCEDURE — 99213 OFFICE O/P EST LOW 20 MIN: CPT | Mod: 25 | Performed by: DERMATOLOGY

## 2023-12-19 PROCEDURE — 11901 INJECT SKIN LESIONS >7: CPT | Mod: GC | Performed by: DERMATOLOGY

## 2023-12-19 ASSESSMENT — PAIN SCALES - GENERAL: PAINLEVEL: NO PAIN (0)

## 2023-12-19 NOTE — LETTER
12/19/2023       RE: Maria C Goodman  [address] [place] [address]     Dear Colleague,    Thank you for referring your patient, Maria C Goodman, to the [facility] [place] [facility]. Please see a copy of my visit note below.    [facility] Dermatology Note  Encounter Date: Dec 19, 2023  Office Visit     Dermatology Problem List:  1. Alopecia areata  - Current tx:  ILK, Clobex shampoo (1-2x a week), H&S shampoo, fluocinolone oil (less since June 2x a month), fluocinonide solution, Rogaine foam (5x a week), Allegra 180 mg morning, cetirizine at night  - Prior: clindamycin lotn PRN  - s/p ILK most recently on 4/12/21, 1/12/21, 10/22/2021, 1/31/22, 6/13/2022, 12/5/2022, 4/11/23, 5/10/2023,  and then every approximate 6 weeks with Dr. Marin, today 12/19/23 with Dr. Brown  - HairMetrix 9/15/20, 4/12/21, 1/31/22, 6/13/22, 12/20/23  2. Psoriasis, managed by [facility] dermatologist, Dr. Joshi  - Clobetasol ointment   3. Joint pain/pos JESSICA, anti-centromere  - follows with outside rheum         SHx: Enjoys bird watching    ____________________________________________    Assessment & Plan:    # Alopecia areata- chronic, improving  - ILK injections in right scalp today, as below (did not do left scalp because skin was not as mobile on the left side and concern for thinning of skin)  - Hair metrix today, as below  - Continue lidex solution once per week   - Continue with clobetasol shampoo 3x weekly  - Continue topical Rogaine at least 5x weekly  - Future: consider PO minoxidil if interested, not currently interested at this time    Procedures Performed:   - HairMetrix: Previous HairMetrix 6/13/2022. Global increase in hair counts with overall good scalp health, with only minimal scaling on the frontal scalp.  - Frontal anterior scalp: 181 to 239 today, mild scale  - Mid scalp: 134 to 149 today  - Vertex scalp: 92  to 166 today  - Occipital scalp: 118 to 157 today  - Right temple: 99 to 152 today  - Left temple: 144 to 208 today     - Intra-lesional triamcinolone procedure note. After verbal consent and review of risk of pain and skin thinning/atrophy, positioning and cleansing with isopropyl alcohol, 0.8 total mL of triamcinolone 10 mg/mL was injected into 10 lesion(s) on the right scalp. The patient tolerated the procedure well and left the dermatology clinic in good condition.    Follow-up: 1/31/2023 appt scheduled with Dr. Marin    Staff and Resident:     Patient seen and staffed with Dr. Brown.     Aakash Gonzalez MD  IM PGY3    Patient was seen and examined with the plastics surgery resident. I agree with the history, review of systems, physical examination, assessments and plan. ILK injections were limited today due to skin atrophy in some scalp regions.  Those that were done were completed by me.    Nita Brown MD  Professor and  Chair  Department of Dermatology  [facility]   ____________________________________________    CC: No chief complaint on file.    HPI:  Ms. Maria C Goodman is a(n) 68 year old female who presents today as a return patient for alopecia areata. She last saw Dr. Marin in derm on 10/25/2023 for alopecia and had ILK injections at that time. She had been getting ILK injections every 4-5 weeks with Dr. Marin ever since April or May 2023.     Current regimen:   - clobetasol shampoo twice per week  - Lidex solution once per week  - OTC topical minoxidil ~3 times per week  - Head & Shoulders shampoo ~3 times per week    She feels that her hair/scalp has been stable without areas of concern. No flakiness in scalp and no itchiness. No scalp pain or burning. Interested in Hair Metrix today.     She is interested in getting microblading for her eyebrows.     Patient is otherwise feeling well, without additional skin concerns.    Labs Reviewed:  N/A    Physical Exam:  SKIN: Focused  examination of scalp and face was performed.  - areas of hair loss overall improved compared to prior without significant scale  - left side of scalp not as mobile as the right side of scalp, hence, will limit injections to the right side of the scalp   - No other lesions of concern on areas examined.     Medications:  Current Outpatient Medications   Medication     ALBUTEROL IN     azelastine (ASTELIN) 0.1 % nasal spray     Calcium Carbonate-Vitamin D (CALCIUM + D PO)     cetirizine (ZYRTEC) 10 MG tablet     Cholecalciferol (VITAMIN D) 1000 UNITS capsule     clobetasol (TEMOVATE) 0.05 % external ointment     clobetasol propionate (CLOBEX) 0.05 % external shampoo     escitalopram (LEXAPRO) 10 MG tablet     famotidine (PEPCID) 10 MG tablet     ferrous gluconate (FERGON) 324 (38 FE) MG tablet     Fluocinolone Acetonide Scalp 0.01 % OIL oil     fluocinonide (LIDEX) 0.05 % external solution     ketoconazole (NIZORAL) 2 % shampoo     levothyroxine (SYNTHROID, LEVOTHROID) 112 MCG tablet     montelukast (SINGULAIR) 10 MG tablet     omalizumab (XOLAIR) 150 MG injection     tacrolimus (PROTOPIC) 0.1 % ointment     tretinoin (RETIN-A) 0.025 % external cream     triamcinolone acetonide (KENALOG) 10 MG/ML injection     VITAMIN D PO     Current Facility-Administered Medications   Medication     betamethasone acet & sod phos (CELESTONE) injection 6 mg     NONFORMULARY     triamcinolone acetonide (KENALOG-10) injection 10 mg     triamcinolone acetonide (KENALOG-10) injection 10 mg     triamcinolone acetonide (KENALOG-10) injection 10 mg     triamcinolone acetonide (KENALOG-10) injection 10 mg     triamcinolone acetonide (KENALOG-10) injection 20 mg     triamcinolone acetonide (KENALOG-10) injection 20 mg     triamcinolone acetonide (KENALOG-10) injection 20 mg     triamcinolone acetonide (KENALOG-10) injection 20 mg     triamcinolone acetonide (KENALOG-10) injection 24 mg     triamcinolone acetonide (KENALOG-10) injection 30 mg      triamcinolone acetonide (KENALOG-10) injection 30 mg      Past Medical History:   Patient Active Problem List   Diagnosis     Alopecia areata     Dermatitis     Hypertrichosis     Urticaria     Autoimmune disease (H24)     Dermatitis, seborrheic     Past Medical History:   Diagnosis Date     Asthma      Hypothyroidism      Lobular breast cancer (H)     s/p chemotherapy and radiation, in remission     Multiple allergies         CC Barrett Mccarty MD and Dr. Dilia Marin  [address] [place], [place] [id number] on close of this encounter.      Again, thank you for allowing me to participate in the care of your patient.      Sincerely,    Nita Brown MD

## 2023-12-19 NOTE — NURSING NOTE
Dermatology Rooming Note    Maria C Goodman's goals for this visit include:   Chief Complaint   Patient presents with    Hair Loss     Patient states things are going well. She wants to do hairmetrix if possible, she reports it being stable.      Elida Gonzalez, visit facilitator

## 2023-12-19 NOTE — PROGRESS NOTES
[facility] Dermatology Note  Encounter Date: Dec 19, 2023  Office Visit     Dermatology Problem List:  1. Alopecia areata  - Current tx:  ILK, Clobex shampoo (1-2x a week), H&S shampoo, fluocinolone oil (less since June 2x a month), fluocinonide solution, Rogaine foam (5x a week), Allegra 180 mg morning, cetirizine at night  - Prior: clindamycin lotn PRN  - s/p ILK most recently on 4/12/21, 1/12/21, 10/22/2021, 1/31/22, 6/13/2022, 12/5/2022, 4/11/23, 5/10/2023,  and then every approximate 6 weeks with Dr. Marin, today 12/19/23 with Dr. Brown  - HairMetrix 9/15/20, 4/12/21, 1/31/22, 6/13/22, 12/20/23  2. Psoriasis, managed by [facility], Dr. Johsi  - Clobetasol ointment   3. Joint pain/pos JESSICA, anti-centromere  - follows with outside rheum         SHx: Enjoys bird watching    ____________________________________________    Assessment & Plan:    # Alopecia areata- chronic, improving  - ILK injections in right scalp today, as below (did not do left scalp because skin was not as mobile on the left side and concern for thinning of skin)  - Hair metrix today, as below  - Continue lidex solution once per week   - Continue with clobetasol shampoo 3x weekly  - Continue topical Rogaine at least 5x weekly  - Future: consider PO minoxidil if interested, not currently interested at this time    Procedures Performed:   - HairMetrix: Previous HairMetrix 6/13/2022. Global increase in hair counts with overall good scalp health, with only minimal scaling on the frontal scalp.  - Frontal anterior scalp: 181 to 239 today, mild scale  - Mid scalp: 134 to 149 today  - Vertex scalp: 92 to 166 today  - Occipital scalp: 118 to 157 today  - Right temple: 99 to 152 today  - Left temple: 144 to 208 today     - Intra-lesional triamcinolone procedure note. After verbal consent and review of risk of pain and skin thinning/atrophy, positioning and cleansing with isopropyl alcohol, 0.8 total mL of triamcinolone  10 mg/mL was injected into 10 lesion(s) on the right scalp. The patient tolerated the procedure well and left the dermatology clinic in good condition.    Follow-up: 1/31/2023 appt scheduled with Dr. Marin    Staff and Resident:     Patient seen and staffed with Dr. Brown.     Aakash Gonzalez MD  IM PGY3    Patient was seen and examined with the plastics surgery resident. I agree with the history, review of systems, physical examination, assessments and plan. ILK injections were limited today due to skin atrophy in some scalp regions.  Those that were done were completed by me.    Nita Brown MD  Professor and  Chair  Department of Dermatology  [facility]   ____________________________________________    CC: No chief complaint on file.    HPI:  Ms. Maria C Goodman is a(n) 68 year old female who presents today as a return patient for alopecia areata. She last saw Dr. Marin in derm on 10/25/2023 for alopecia and had ILK injections at that time. She had been getting ILK injections every 4-5 weeks with Dr. Marin ever since April or May 2023.     Current regimen:   - clobetasol shampoo twice per week  - Lidex solution once per week  - OTC topical minoxidil ~3 times per week  - Head & Shoulders shampoo ~3 times per week    She feels that her hair/scalp has been stable without areas of concern. No flakiness in scalp and no itchiness. No scalp pain or burning. Interested in Hair Metrix today.     She is interested in getting microblading for her eyebrows.     Patient is otherwise feeling well, without additional skin concerns.    Labs Reviewed:  N/A    Physical Exam:  SKIN: Focused examination of scalp and face was performed.  - areas of hair loss overall improved compared to prior without significant scale  - left side of scalp not as mobile as the right side of scalp, hence, will limit injections to the right side of the scalp   - No other lesions of concern on areas examined.      Medications:  Current Outpatient Medications   Medication    ALBUTEROL IN    azelastine (ASTELIN) 0.1 % nasal spray    Calcium Carbonate-Vitamin D (CALCIUM + D PO)    cetirizine (ZYRTEC) 10 MG tablet    Cholecalciferol (VITAMIN D) 1000 UNITS capsule    clobetasol (TEMOVATE) 0.05 % external ointment    clobetasol propionate (CLOBEX) 0.05 % external shampoo    escitalopram (LEXAPRO) 10 MG tablet    famotidine (PEPCID) 10 MG tablet    ferrous gluconate (FERGON) 324 (38 FE) MG tablet    Fluocinolone Acetonide Scalp 0.01 % OIL oil    fluocinonide (LIDEX) 0.05 % external solution    ketoconazole (NIZORAL) 2 % shampoo    levothyroxine (SYNTHROID, LEVOTHROID) 112 MCG tablet    montelukast (SINGULAIR) 10 MG tablet    omalizumab (XOLAIR) 150 MG injection    tacrolimus (PROTOPIC) 0.1 % ointment    tretinoin (RETIN-A) 0.025 % external cream    triamcinolone acetonide (KENALOG) 10 MG/ML injection    VITAMIN D PO     Current Facility-Administered Medications   Medication    betamethasone acet & sod phos (CELESTONE) injection 6 mg    NONFORMULARY    triamcinolone acetonide (KENALOG-10) injection 10 mg    triamcinolone acetonide (KENALOG-10) injection 10 mg    triamcinolone acetonide (KENALOG-10) injection 10 mg    triamcinolone acetonide (KENALOG-10) injection 10 mg    triamcinolone acetonide (KENALOG-10) injection 20 mg    triamcinolone acetonide (KENALOG-10) injection 20 mg    triamcinolone acetonide (KENALOG-10) injection 20 mg    triamcinolone acetonide (KENALOG-10) injection 20 mg    triamcinolone acetonide (KENALOG-10) injection 24 mg    triamcinolone acetonide (KENALOG-10) injection 30 mg    triamcinolone acetonide (KENALOG-10) injection 30 mg      Past Medical History:   Patient Active Problem List   Diagnosis    Alopecia areata    Dermatitis    Hypertrichosis    Urticaria    Autoimmune disease (H24)    Dermatitis, seborrheic     Past Medical History:   Diagnosis Date    Asthma     Hypothyroidism     Lobular breast  cancer (H)     s/p chemotherapy and radiation, in remission    Multiple allergies         CC Barrett Mccarty MD and Dr. Dilia Marin  [address] [place] [id number] on close of this encounter.

## 2023-12-19 NOTE — NURSING NOTE
Drug Administration Record    Prior to injection, verified patient identity using patient's name and date of birth.  Due to injection administration, patient instructed to remain in clinic for 15 minutes  afterwards, and to report any adverse reaction to me immediately.    Drug Name: triamcinolone acetonide(kenalog)  Dose: 2mL of triamcinolone 10mg/mL, 20mg dose  Route administered: ID  NDC #: [id number]  Amount of waste(mL):3ml  Reason for waste: Multi dose vial    LOT #: [id number]  SITE: see provider note  : [employer]  EXPIRATION DATE: 12/2025

## 2024-01-02 NOTE — PROGRESS NOTES
[employer] Summary (12/19/2023):    Frontal    Superior    Vertex    Right temporal    Left temporal

## 2024-01-02 NOTE — PROGRESS NOTES
HairMetrix Summary (12/19/2023)    Frontal anterior (6.5 cm)    Mid scalp (12 cm)    Vertex (24 cm)    Occipital (30 cm)    Right temporal (9.5, 9 cm)    Left temporal (9, 8 cm)    Summary

## 2024-01-31 ENCOUNTER — OFFICE VISIT (OUTPATIENT)
Dept: DERMATOLOGY | Facility: CLINIC | Age: 69
End: 2024-01-31
Payer: COMMERCIAL

## 2024-01-31 DIAGNOSIS — L63.9 ALOPECIA AREATA: Primary | ICD-10-CM

## 2024-01-31 PROCEDURE — 11901 INJECT SKIN LESIONS >7: CPT | Performed by: DERMATOLOGY

## 2024-01-31 NOTE — LETTER
1/31/2024       RE: Maria C Goodman  [address] [place] [address]     Dear Colleague,    Thank you for referring your patient, Maria C Goodman, to the [facility] DERMATOLOGY CLINIC [place] at [facility]. Please see a copy of my visit note below.    [facility] Dermatology Note  Encounter Date: Jan 31, 2024  Office Visit     Dermatology Problem List:  1. Alopecia areata  - Current tx:  ILK, Clobex shampoo (1-2x a week), H&S shampoo, fluocinolone oil (less since June 2x a month), fluocinonide solution, Rogaine foam (5x a week), Allegra 180 mg morning, cetirizine at night  - Prior: clindamycin lotn PRN  - s/p ILK most recently on 4/12/21, 1/12/21, 10/22/2021, 1/31/22, 6/13/2022, 12/5/2022, 4/11/23, 5/10/2023,  and then every approximate 6 weeks with Dr. Marin, today 12/19/23 with Dr. Brown  - HairMetrix 9/15/20, 4/12/21, 1/31/22, 6/13/22, 12/20/23  2. Psoriasis, managed by [facility] dermatologist, Dr. Joshi  - Clobetasol ointment   3. Joint pain/pos JESSICA, anti-centromere  - follows with outside rheum         SHx: Enjoys bird watching    ____________________________________________    Assessment & Plan:    # Alopecia areata- chronic, improving. Last visit, only right side of scalp received ILK due to concern for scalp thinning. Overall today with decreased global density, worsened at temples/lateral parietal scalp possibly due to ?COVID. Good mobility today, no concern for thinning so will perform ILK to entire scalp again. Continue to monitor.  - ILK injections performed today. See procedure note below.  - Continue lidex solution once per week   - Continue with clobetasol shampoo 3x weekly  - Continue topical Rogaine at least 5x weekly  - Future: consider PO minoxidil if interested, not currently interested at this time      Procedures Performed:   - Intra-lesional triamcinolone procedure note. After verbal consent and review  of risk of pain and skin thinning/atrophy, positioning and cleansing with isopropyl alcohol, 3 total mL of triamcinolone 10 mg/mL was injected into 30 lesion(s) on the scalp. The patient tolerated the procedure well and left the dermatology clinic in good condition.    Follow-up: 4-6 week(s) in-person as scheduled, or earlier for new or changing lesions    Staff and Scribe:     Scribe Disclosure:   I, ADOLFO RAMIREZ, am serving as a scribe; to document services personally performed by Dilia Marin MD -based on data collection and the provider's statements to me.    Provider Disclosure:   The documentation recorded by the scribe accurately reflects the services I personally performed and the decisions made by me.    Dilia Marin MD    Department of Dermatology  [facility]: Phone: [phone], Fax: [phone]  2/5/2024     ____________________________________________    CC: Derm Problem (Here for ILK injections. )    HPI:  Ms. Maria C Goodman is a(n) 68 year old female who presents today as a return patient for hair loss.    The patient was last seen by Dr. Brown on 12/2023. There was concern for skin thinning on the left side of the scalp. At the time, injections were done on the right side of her scalp only.    Since her last visit, with Dr. Brown, the patient had COVID.    About 2 weeks ago, travelled to [place] with some friends to go bird watching.    Patient is otherwise feeling well, without additional skin concerns.    Labs Reviewed:  N/A    Physical Exam:  Vitals: There were no vitals taken for this visit.  SKIN: Focused examination of the scalp was performed.  - Decreased density, most prominent on frontal temporal scalp bilateral, mid front scalp with occasional patches of thinning. Compared to prior, there has been worsening.  - No other lesions of concern on areas examined.      Medications:  Current Outpatient Medications   Medication    ALBUTEROL IN    azelastine (ASTELIN) 0.1 % nasal spray    Calcium Carbonate-Vitamin D (CALCIUM + D PO)    cetirizine (ZYRTEC) 10 MG tablet    Cholecalciferol (VITAMIN D) 1000 UNITS capsule    clobetasol (TEMOVATE) 0.05 % external ointment    clobetasol propionate (CLOBEX) 0.05 % external shampoo    escitalopram (LEXAPRO) 10 MG tablet    famotidine (PEPCID) 10 MG tablet    ferrous gluconate (FERGON) 324 (38 FE) MG tablet    Fluocinolone Acetonide Scalp 0.01 % OIL oil    fluocinonide (LIDEX) 0.05 % external solution    ketoconazole (NIZORAL) 2 % shampoo    levothyroxine (SYNTHROID, LEVOTHROID) 112 MCG tablet    montelukast (SINGULAIR) 10 MG tablet    tacrolimus (PROTOPIC) 0.1 % ointment    tretinoin (RETIN-A) 0.025 % external cream    triamcinolone acetonide (KENALOG) 10 MG/ML injection    VITAMIN D PO    omalizumab (XOLAIR) 150 MG injection     Current Facility-Administered Medications   Medication    betamethasone acet & sod phos (CELESTONE) injection 6 mg    NONFORMULARY    triamcinolone acetonide (KENALOG-10) injection 10 mg    triamcinolone acetonide (KENALOG-10) injection 10 mg    triamcinolone acetonide (KENALOG-10) injection 10 mg    triamcinolone acetonide (KENALOG-10) injection 10 mg    triamcinolone acetonide (KENALOG-10) injection 20 mg    triamcinolone acetonide (KENALOG-10) injection 20 mg    triamcinolone acetonide (KENALOG-10) injection 20 mg    triamcinolone acetonide (KENALOG-10) injection 20 mg    triamcinolone acetonide (KENALOG-10) injection 24 mg    triamcinolone acetonide (KENALOG-10) injection 30 mg    triamcinolone acetonide (KENALOG-10) injection 30 mg      Past Medical History:   Patient Active Problem List   Diagnosis    Alopecia areata    Dermatitis    Hypertrichosis    Urticaria    Autoimmune disease (H24)    Dermatitis, seborrheic     Past Medical History:   Diagnosis Date    Asthma     Hypothyroidism     Lobular breast  cancer (H)     s/p chemotherapy and radiation, in remission    Multiple allergies         CC No referring provider defined for this encounter. on close of this encounter.

## 2024-01-31 NOTE — NURSING NOTE
Drug Administration Record    Prior to injection, verified patient identity using patient's name and date of birth.  Due to injection administration, patient instructed to remain in clinic for 15 minutes  afterwards, and to report any adverse reaction to me immediately.    Drug Name: triamcinolone acetonide(kenalog)  Dose: 3mL of triamcinolone 10mg/mL, 30mg dose  Route administered: ID  NDC #: [id number]  Amount of waste(mL):2ml  Reason for waste: Multi dose vial    LOT #: [id number]  SITE: see provieder note  : [employer]  EXPIRATION DATE: 12/2025

## 2024-01-31 NOTE — PROGRESS NOTES
[facility] Dermatology Note  Encounter Date: Jan 31, 2024  Office Visit     Dermatology Problem List:  1. Alopecia areata  - Current tx:  ILK, Clobex shampoo (1-2x a week), H&S shampoo, fluocinolone oil (less since June 2x a month), fluocinonide solution, Rogaine foam (5x a week), Allegra 180 mg morning, cetirizine at night  - Prior: clindamycin lotn PRN  - s/p ILK most recently on 4/12/21, 1/12/21, 10/22/2021, 1/31/22, 6/13/2022, 12/5/2022, 4/11/23, 5/10/2023,  and then every approximate 6 weeks with Dr. Marin, today 12/19/23 with Dr. Brown  - HairMetrix 9/15/20, 4/12/21, 1/31/22, 6/13/22, 12/20/23  2. Psoriasis, managed by [facility] dermatologist, Dr. Joshi  - Clobetasol ointment   3. Joint pain/pos JESSICA, anti-centromere  - follows with outside rheum         SHx: Enjoys bird watching    ____________________________________________    Assessment & Plan:    # Alopecia areata- chronic, improving. Last visit, only right side of scalp received ILK due to concern for scalp thinning. Overall today with decreased global density, worsened at temples/lateral parietal scalp possibly due to ?COVID. Good mobility today, no concern for thinning so will perform ILK to entire scalp again. Continue to monitor.  - ILK injections performed today. See procedure note below.  - Continue lidex solution once per week   - Continue with clobetasol shampoo 3x weekly  - Continue topical Rogaine at least 5x weekly  - Future: consider PO minoxidil if interested, not currently interested at this time      Procedures Performed:   - Intra-lesional triamcinolone procedure note. After verbal consent and review of risk of pain and skin thinning/atrophy, positioning and cleansing with isopropyl alcohol, 3 total mL of triamcinolone 10 mg/mL was injected into 30 lesion(s) on the scalp. The patient tolerated the procedure well and left the dermatology clinic in good condition.    Follow-up: 4-6 week(s) in-person as scheduled, or  earlier for new or changing lesions    Staff and Scribe:     Scribe Disclosure:   I, ADOLFOLYNN RAMIREZ, am serving as a scribe; to document services personally performed by Dilia Marin MD -based on data collection and the provider's statements to me.    Provider Disclosure:   The documentation recorded by the scribe accurately reflects the services I personally performed and the decisions made by me.    Dilia Marin MD    Department of Dermatology  [facility]: Phone: [phone], Fax: [phone]  2/5/2024     ____________________________________________    CC: Derm Problem (Here for ILK injections. )    HPI:  Ms. Maria C Goodman is a(n) 68 year old female who presents today as a return patient for hair loss.    The patient was last seen by Dr. Brown on 12/2023. There was concern for skin thinning on the left side of the scalp. At the time, injections were done on the right side of her scalp only.    Since her last visit, with Dr. Brown, the patient had COVID.    About 2 weeks ago, travelled to [place] with some friends to go bird watching.    Patient is otherwise feeling well, without additional skin concerns.    Labs Reviewed:  N/A    Physical Exam:  Vitals: There were no vitals taken for this visit.  SKIN: Focused examination of the scalp was performed.  - Decreased density, most prominent on frontal temporal scalp bilateral, mid front scalp with occasional patches of thinning. Compared to prior, there has been worsening.  - No other lesions of concern on areas examined.     Medications:  Current Outpatient Medications   Medication    ALBUTEROL IN    azelastine (ASTELIN) 0.1 % nasal spray    Calcium Carbonate-Vitamin D (CALCIUM + D PO)    cetirizine (ZYRTEC) 10 MG tablet    Cholecalciferol (VITAMIN D) 1000 UNITS capsule    clobetasol (TEMOVATE) 0.05 % external ointment    clobetasol propionate (CLOBEX)  0.05 % external shampoo    escitalopram (LEXAPRO) 10 MG tablet    famotidine (PEPCID) 10 MG tablet    ferrous gluconate (FERGON) 324 (38 FE) MG tablet    Fluocinolone Acetonide Scalp 0.01 % OIL oil    fluocinonide (LIDEX) 0.05 % external solution    ketoconazole (NIZORAL) 2 % shampoo    levothyroxine (SYNTHROID, LEVOTHROID) 112 MCG tablet    montelukast (SINGULAIR) 10 MG tablet    tacrolimus (PROTOPIC) 0.1 % ointment    tretinoin (RETIN-A) 0.025 % external cream    triamcinolone acetonide (KENALOG) 10 MG/ML injection    VITAMIN D PO    omalizumab (XOLAIR) 150 MG injection     Current Facility-Administered Medications   Medication    betamethasone acet & sod phos (CELESTONE) injection 6 mg    NONFORMULARY    triamcinolone acetonide (KENALOG-10) injection 10 mg    triamcinolone acetonide (KENALOG-10) injection 10 mg    triamcinolone acetonide (KENALOG-10) injection 10 mg    triamcinolone acetonide (KENALOG-10) injection 10 mg    triamcinolone acetonide (KENALOG-10) injection 20 mg    triamcinolone acetonide (KENALOG-10) injection 20 mg    triamcinolone acetonide (KENALOG-10) injection 20 mg    triamcinolone acetonide (KENALOG-10) injection 20 mg    triamcinolone acetonide (KENALOG-10) injection 24 mg    triamcinolone acetonide (KENALOG-10) injection 30 mg    triamcinolone acetonide (KENALOG-10) injection 30 mg      Past Medical History:   Patient Active Problem List   Diagnosis    Alopecia areata    Dermatitis    Hypertrichosis    Urticaria    Autoimmune disease (H24)    Dermatitis, seborrheic     Past Medical History:   Diagnosis Date    Asthma     Hypothyroidism     Lobular breast cancer (H)     s/p chemotherapy and radiation, in remission    Multiple allergies         CC No referring provider defined for this encounter. on close of this encounter.

## 2024-01-31 NOTE — Clinical Note
JAVID Lombardi had small flare after you saw her last possibly due to COVID! But overall she still feels like things are going well. I am messaging you as we had a question on the skin thinning/scalp mobility. At your most recent visit, you held off on ILK on one side of scalp as this side felt less mobile. Given her recent flare, we resumed ILK to whole scalp. I have not previously assessed scalp mobility and would love to learn. Sending you this so I remember to ask you to teach me the next time I see you!! Hope all is well :) thank you!

## 2024-02-10 ENCOUNTER — HEALTH MAINTENANCE LETTER (OUTPATIENT)
Age: 69
End: 2024-02-10

## 2024-03-06 ENCOUNTER — OFFICE VISIT (OUTPATIENT)
Dept: DERMATOLOGY | Facility: CLINIC | Age: 69
End: 2024-03-06
Payer: COMMERCIAL

## 2024-03-06 DIAGNOSIS — L63.9 ALOPECIA AREATA: Primary | ICD-10-CM

## 2024-03-06 PROCEDURE — 11901 INJECT SKIN LESIONS >7: CPT | Performed by: DERMATOLOGY

## 2024-03-06 ASSESSMENT — PAIN SCALES - GENERAL: PAINLEVEL: NO PAIN (0)

## 2024-03-06 NOTE — NURSING NOTE
Chief Complaint   Patient presents with    Derm Problem     Patient presens for ILK injection treatment.      Yolanda Ma LPN

## 2024-03-06 NOTE — LETTER
3/6/2024       RE: Maria C Goodman  [address] [place] [address]     Dear Colleague,    Thank you for referring your patient, Maria C Goodman, to the [facility] DERMATOLOGY CLINIC [place] at [facility]. Please see a copy of my visit note below.    [facility] Dermatology Note  Encounter Date: Mar 6, 2024  Office Visit     Dermatology Problem List:  1. Alopecia areata  - Current tx:  ILK, Clobex shampoo (1-2x a week), H&S shampoo, fluocinolone oil (less since June 2x a month), fluocinonide solution, Rogaine foam (5x a week), Allegra 180 mg morning, cetirizine at night  - Prior: clindamycin lotn PRN  - s/p ILK most recently on 4/12/21, 1/12/21, 10/22/2021, 1/31/22, 6/13/2022, 12/5/2022, 4/11/23, 5/10/2023,  and then every approximate 6 weeks with Dr. Marin, today 12/19/23 with Dr. Brown  - HairMetrix 9/15/20, 4/12/21, 1/31/22, 6/13/22, 12/20/23  2. Psoriasis, managed by [facility] dermatologist, Dr. Joshi  - Clobetasol ointment   3. Joint pain/pos JESSICA, anti-centromere  - follows with outside rheum      SHx: Enjoys bird watching  ____________________________________________    Assessment & Plan:    # Alopecia areata- chronic, improving.   Patient feels the past month has been not great with keeping up with her routine. She has only been applying clobetasol shampoo consistently. Despite this, improved density today. She will try to be a little more diligent with routine.  - Photos obtained today.  - ILK injections performed today. See procedure note below.  - Continue lidex solution once per week   - Continue with clobetasol shampoo 3x weekly  - Continue topical Rogaine at least 5x weekly  - Future: consider PO minoxidil if interested, not currently interested at this time      Procedures Performed:   - Intra-lesional triamcinolone procedure note. After verbal consent and review of risk of pain and skin thinning/atrophy,  positioning and cleansing with isopropyl alcohol, 3 total mL of triamcinolone 10 mg/mL was injected into 30 lesion(s) on the scalp. The patient tolerated the procedure well and left the dermatology clinic in good condition.    Follow-up: 4-6 week(s) in-person, or earlier for new or changing lesions    Staff and Scribe:     Scribe Disclosure:   I, ADOLFO RAMIREZ, am serving as a scribe; to document services personally performed by Dilia Marin MD -based on data collection and the provider's statements to me.    Provider Disclosure:   The documentation recorded by the scribe accurately reflects the services I personally performed and the decisions made by me.    Dilia Marin MD    Department of Dermatology  [facility]: Phone: [phone], Fax: [phone]  3/12/2024     ____________________________________________    CC: Derm Problem (Patient presens for ILK injection treatment. )    HPI:  Ms. Maria C Goodman is a(n) 68 year old female who presents today as a return patient for hair loss.    She feels this past month has been terrible. She has not been consistent with her routine. She has been applying the shampoo. Has only used lidex about 3x.     Believes previous shed hit a plateau.     Patient is otherwise feeling well, without additional skin concerns.    Labs Reviewed:  N/A    Physical Exam:  Vitals: There were no vitals taken for this visit.  SKIN: Focused examination of the scalp was performed.  - Scalp with more discrete alopecia patch on midline frontal scalp  - Bilateral frontal temporal region with decreased density. Compared to prior, improved.  - No other lesions of concern on areas examined.     Medications:  Current Outpatient Medications   Medication    ALBUTEROL IN    azelastine (ASTELIN) 0.1 % nasal spray    Calcium Carbonate-Vitamin D (CALCIUM + D PO)    cetirizine (ZYRTEC) 10 MG tablet     Cholecalciferol (VITAMIN D) 1000 UNITS capsule    clobetasol (TEMOVATE) 0.05 % external ointment    clobetasol propionate (CLOBEX) 0.05 % external shampoo    escitalopram (LEXAPRO) 10 MG tablet    famotidine (PEPCID) 10 MG tablet    ferrous gluconate (FERGON) 324 (38 FE) MG tablet    Fluocinolone Acetonide Scalp 0.01 % OIL oil    fluocinonide (LIDEX) 0.05 % external solution    ketoconazole (NIZORAL) 2 % shampoo    levothyroxine (SYNTHROID, LEVOTHROID) 112 MCG tablet    montelukast (SINGULAIR) 10 MG tablet    tacrolimus (PROTOPIC) 0.1 % ointment    tretinoin (RETIN-A) 0.025 % external cream    triamcinolone acetonide (KENALOG) 10 MG/ML injection    VITAMIN D PO    omalizumab (XOLAIR) 150 MG injection     Current Facility-Administered Medications   Medication    betamethasone acet & sod phos (CELESTONE) injection 6 mg    NONFORMULARY    triamcinolone acetonide (KENALOG-10) injection 10 mg    triamcinolone acetonide (KENALOG-10) injection 10 mg    triamcinolone acetonide (KENALOG-10) injection 10 mg    triamcinolone acetonide (KENALOG-10) injection 10 mg    triamcinolone acetonide (KENALOG-10) injection 20 mg    triamcinolone acetonide (KENALOG-10) injection 20 mg    triamcinolone acetonide (KENALOG-10) injection 20 mg    triamcinolone acetonide (KENALOG-10) injection 20 mg    triamcinolone acetonide (KENALOG-10) injection 24 mg    triamcinolone acetonide (KENALOG-10) injection 30 mg    triamcinolone acetonide (KENALOG-10) injection 30 mg    triamcinolone acetonide (KENALOG-10) injection 30 mg      Past Medical History:   Patient Active Problem List   Diagnosis    Alopecia areata    Dermatitis    Hypertrichosis    Urticaria    Autoimmune disease (H24)    Dermatitis, seborrheic     Past Medical History:   Diagnosis Date    Asthma     Hypothyroidism     Lobular breast cancer (H)     s/p chemotherapy and radiation, in remission    Multiple allergies         CC No referring provider defined for this encounter. on close of  this encounter.

## 2024-03-06 NOTE — PROGRESS NOTES
[facility] Dermatology Note  Encounter Date: Mar 6, 2024  Office Visit     Dermatology Problem List:  1. Alopecia areata  - Current tx:  ILK, Clobex shampoo (1-2x a week), H&S shampoo, fluocinolone oil (less since June 2x a month), fluocinonide solution, Rogaine foam (5x a week), Allegra 180 mg morning, cetirizine at night  - Prior: clindamycin lotn PRN  - s/p ILK most recently on 4/12/21, 1/12/21, 10/22/2021, 1/31/22, 6/13/2022, 12/5/2022, 4/11/23, 5/10/2023,  and then every approximate 6 weeks with Dr. Marin, today 12/19/23 with Dr. Brown  - HairMetrix 9/15/20, 4/12/21, 1/31/22, 6/13/22, 12/20/23  2. Psoriasis, managed by [facility] dermatologist, Dr. Joshi  - Clobetasol ointment   3. Joint pain/pos JESSICA, anti-centromere  - follows with outside rheum      SHx: Enjoys bird watching  ____________________________________________    Assessment & Plan:    # Alopecia areata- chronic, improving.   Patient feels the past month has been not great with keeping up with her routine. She has only been applying clobetasol shampoo consistently. Despite this, improved density today. She will try to be a little more diligent with routine.  - Photos obtained today.  - ILK injections performed today. See procedure note below.  - Continue lidex solution once per week   - Continue with clobetasol shampoo 3x weekly  - Continue topical Rogaine at least 5x weekly  - Future: consider PO minoxidil if interested, not currently interested at this time      Procedures Performed:   - Intra-lesional triamcinolone procedure note. After verbal consent and review of risk of pain and skin thinning/atrophy, positioning and cleansing with isopropyl alcohol, 3 total mL of triamcinolone 10 mg/mL was injected into 30 lesion(s) on the scalp. The patient tolerated the procedure well and left the dermatology clinic in good condition.    Follow-up: 4-6 week(s) in-person, or earlier for new or changing lesions    Staff and Scribe:      Scribe Disclosure:   I, ADOLFO RAMIREZ, am serving as a scribe; to document services personally performed by Dilia Marin MD -based on data collection and the provider's statements to me.    Provider Disclosure:   The documentation recorded by the scribe accurately reflects the services I personally performed and the decisions made by me.    Dilia Marin MD    Department of Dermatology  [facility]: Phone: [phone], Fax: [phone]  3/12/2024     ____________________________________________    CC: Derm Problem (Patient presens for ILK injection treatment. )    HPI:  Ms. Maria C Goodman is a(n) 68 year old female who presents today as a return patient for hair loss.    She feels this past month has been terrible. She has not been consistent with her routine. She has been applying the shampoo. Has only used lidex about 3x.     Believes previous shed hit a plateau.     Patient is otherwise feeling well, without additional skin concerns.    Labs Reviewed:  N/A    Physical Exam:  Vitals: There were no vitals taken for this visit.  SKIN: Focused examination of the scalp was performed.  - Scalp with more discrete alopecia patch on midline frontal scalp  - Bilateral frontal temporal region with decreased density. Compared to prior, improved.  - No other lesions of concern on areas examined.     Medications:  Current Outpatient Medications   Medication    ALBUTEROL IN    azelastine (ASTELIN) 0.1 % nasal spray    Calcium Carbonate-Vitamin D (CALCIUM + D PO)    cetirizine (ZYRTEC) 10 MG tablet    Cholecalciferol (VITAMIN D) 1000 UNITS capsule    clobetasol (TEMOVATE) 0.05 % external ointment    clobetasol propionate (CLOBEX) 0.05 % external shampoo    escitalopram (LEXAPRO) 10 MG tablet    famotidine (PEPCID) 10 MG tablet    ferrous gluconate (FERGON) 324 (38 FE) MG tablet    Fluocinolone Acetonide Scalp 0.01 %  OIL oil    fluocinonide (LIDEX) 0.05 % external solution    ketoconazole (NIZORAL) 2 % shampoo    levothyroxine (SYNTHROID, LEVOTHROID) 112 MCG tablet    montelukast (SINGULAIR) 10 MG tablet    tacrolimus (PROTOPIC) 0.1 % ointment    tretinoin (RETIN-A) 0.025 % external cream    triamcinolone acetonide (KENALOG) 10 MG/ML injection    VITAMIN D PO    omalizumab (XOLAIR) 150 MG injection     Current Facility-Administered Medications   Medication    betamethasone acet & sod phos (CELESTONE) injection 6 mg    NONFORMULARY    triamcinolone acetonide (KENALOG-10) injection 10 mg    triamcinolone acetonide (KENALOG-10) injection 10 mg    triamcinolone acetonide (KENALOG-10) injection 10 mg    triamcinolone acetonide (KENALOG-10) injection 10 mg    triamcinolone acetonide (KENALOG-10) injection 20 mg    triamcinolone acetonide (KENALOG-10) injection 20 mg    triamcinolone acetonide (KENALOG-10) injection 20 mg    triamcinolone acetonide (KENALOG-10) injection 20 mg    triamcinolone acetonide (KENALOG-10) injection 24 mg    triamcinolone acetonide (KENALOG-10) injection 30 mg    triamcinolone acetonide (KENALOG-10) injection 30 mg    triamcinolone acetonide (KENALOG-10) injection 30 mg      Past Medical History:   Patient Active Problem List   Diagnosis    Alopecia areata    Dermatitis    Hypertrichosis    Urticaria    Autoimmune disease (H24)    Dermatitis, seborrheic     Past Medical History:   Diagnosis Date    Asthma     Hypothyroidism     Lobular breast cancer (H)     s/p chemotherapy and radiation, in remission    Multiple allergies         CC No referring provider defined for this encounter. on close of this encounter.

## 2024-03-06 NOTE — NURSING NOTE
Drug Administration Record    Prior to injection, verified patient identity using patient's name and date of birth.  Due to injection administration, patient instructed to remain in clinic for 15 minutes  afterwards, and to report any adverse reaction to me immediately.    Drug Name: triamcinolone acetonide(kenalog)  Dose: 3 mL  Route administered: ID  NDC #: 2820-3419-30  Amount of waste(mL): 2 mL  Reason for waste: Single use vial    LOT #: [id number]  SITE: See Chart  : [employer]  EXPIRATION DATE: 12/2025

## 2024-05-06 ENCOUNTER — LAB (OUTPATIENT)
Dept: LAB | Facility: CLINIC | Age: 69
End: 2024-05-06
Payer: COMMERCIAL

## 2024-05-06 ENCOUNTER — OFFICE VISIT (OUTPATIENT)
Dept: DERMATOLOGY | Facility: CLINIC | Age: 69
End: 2024-05-06
Payer: COMMERCIAL

## 2024-05-06 DIAGNOSIS — L63.9 ALOPECIA AREATA: ICD-10-CM

## 2024-05-06 DIAGNOSIS — L21.9 DERMATITIS, SEBORRHEIC: ICD-10-CM

## 2024-05-06 DIAGNOSIS — L63.9 ALOPECIA AREATA: Primary | ICD-10-CM

## 2024-05-06 LAB
ALBUMIN SERPL BCG-MCNC: 4 G/DL (ref 3.5–5.2)
ALP SERPL-CCNC: 60 U/L (ref 40–150)
ALT SERPL W P-5'-P-CCNC: 19 U/L (ref 0–50)
ANION GAP SERPL CALCULATED.3IONS-SCNC: 9 MMOL/L (ref 7–15)
AST SERPL W P-5'-P-CCNC: 33 U/L (ref 0–45)
BASOPHILS # BLD AUTO: 0 10E3/UL (ref 0–0.2)
BASOPHILS NFR BLD AUTO: 1 %
BILIRUB SERPL-MCNC: 0.4 MG/DL
BUN SERPL-MCNC: 13.2 MG/DL (ref 8–23)
CALCIUM SERPL-MCNC: 9.1 MG/DL (ref 8.8–10.2)
CHLORIDE SERPL-SCNC: 104 MMOL/L (ref 98–107)
CREAT SERPL-MCNC: 0.94 MG/DL (ref 0.51–0.95)
DEPRECATED HCO3 PLAS-SCNC: 24 MMOL/L (ref 22–29)
EGFRCR SERPLBLD CKD-EPI 2021: 66 ML/MIN/1.73M2
EOSINOPHIL # BLD AUTO: 0.2 10E3/UL (ref 0–0.7)
EOSINOPHIL NFR BLD AUTO: 4 %
ERYTHROCYTE [DISTWIDTH] IN BLOOD BY AUTOMATED COUNT: 12.4 % (ref 10–15)
GLUCOSE SERPL-MCNC: 101 MG/DL (ref 70–99)
HCT VFR BLD AUTO: 41.6 % (ref 35–47)
HGB BLD-MCNC: 14.3 G/DL (ref 11.7–15.7)
IMM GRANULOCYTES # BLD: 0 10E3/UL
IMM GRANULOCYTES NFR BLD: 0 %
LYMPHOCYTES # BLD AUTO: 1.5 10E3/UL (ref 0.8–5.3)
LYMPHOCYTES NFR BLD AUTO: 26 %
MCH RBC QN AUTO: 31.3 PG (ref 26.5–33)
MCHC RBC AUTO-ENTMCNC: 34.4 G/DL (ref 31.5–36.5)
MCV RBC AUTO: 91 FL (ref 78–100)
MONOCYTES # BLD AUTO: 0.6 10E3/UL (ref 0–1.3)
MONOCYTES NFR BLD AUTO: 9 %
NEUTROPHILS # BLD AUTO: 3.5 10E3/UL (ref 1.6–8.3)
NEUTROPHILS NFR BLD AUTO: 60 %
NRBC # BLD AUTO: 0 10E3/UL
NRBC BLD AUTO-RTO: 0 /100
PLATELET # BLD AUTO: 258 10E3/UL (ref 150–450)
POTASSIUM SERPL-SCNC: 4.4 MMOL/L (ref 3.4–5.3)
PROT SERPL-MCNC: 7.1 G/DL (ref 6.4–8.3)
RBC # BLD AUTO: 4.57 10E6/UL (ref 3.8–5.2)
SODIUM SERPL-SCNC: 137 MMOL/L (ref 135–145)
TSH SERPL DL<=0.005 MIU/L-ACNC: 1.31 UIU/ML (ref 0.3–4.2)
VIT D+METAB SERPL-MCNC: 57 NG/ML (ref 20–50)
WBC # BLD AUTO: 5.8 10E3/UL (ref 4–11)

## 2024-05-06 PROCEDURE — 99000 SPECIMEN HANDLING OFFICE-LAB: CPT | Performed by: PATHOLOGY

## 2024-05-06 PROCEDURE — 82306 VITAMIN D 25 HYDROXY: CPT | Performed by: DERMATOLOGY

## 2024-05-06 PROCEDURE — 36415 COLL VENOUS BLD VENIPUNCTURE: CPT | Performed by: PATHOLOGY

## 2024-05-06 PROCEDURE — 11901 INJECT SKIN LESIONS >7: CPT | Mod: GC | Performed by: DERMATOLOGY

## 2024-05-06 PROCEDURE — 80053 COMPREHEN METABOLIC PANEL: CPT | Performed by: PATHOLOGY

## 2024-05-06 PROCEDURE — 84443 ASSAY THYROID STIM HORMONE: CPT | Performed by: PATHOLOGY

## 2024-05-06 PROCEDURE — 99213 OFFICE O/P EST LOW 20 MIN: CPT | Mod: 25 | Performed by: DERMATOLOGY

## 2024-05-06 PROCEDURE — 85025 COMPLETE CBC W/AUTO DIFF WBC: CPT | Performed by: PATHOLOGY

## 2024-05-06 RX ORDER — CLOBETASOL PROPIONATE 0.05 G/100ML
SHAMPOO TOPICAL
Qty: 118 ML | Refills: 11 | Status: SHIPPED | OUTPATIENT
Start: 2024-05-06

## 2024-05-06 RX ORDER — FLUOCINONIDE TOPICAL SOLUTION USP, 0.05% 0.5 MG/ML
SOLUTION TOPICAL 2 TIMES DAILY PRN
Qty: 60 ML | Refills: 5 | Status: SHIPPED | OUTPATIENT
Start: 2024-05-06

## 2024-05-06 ASSESSMENT — PAIN SCALES - GENERAL: PAINLEVEL: NO PAIN (0)

## 2024-05-06 NOTE — NURSING NOTE
Drug Administration Record    Prior to injection, verified patient identity using patient's name and date of birth.  Due to injection administration, patient instructed to remain in clinic for 15 minutes  afterwards, and to report any adverse reaction to me immediately.    Drug Name: triamcinolone acetonide(kenalog)  Dose: 3mL of triamcinolone 10mg/mL, 30mg dose  Route administered: IM  NDC #: 7526-4651-18  Amount of waste(mL):2mL  Reason for waste: Multi dose vial    LOT #: [id number]  SITE: see provider note  : [employer]  EXPIRATION DATE: JAN2026

## 2024-05-06 NOTE — NURSING NOTE
"Dermatology Rooming Note    Maria C Goodman's goals for this visit include:   Chief Complaint   Patient presents with    Hair Loss     AA follow-up: Stable, \"maybe a little more loss\" per patient. Patient notes she has been pretty busy  Scalp is overall a little more itchy.     Maddi Pryor, MARGARETTE    "

## 2024-05-06 NOTE — PROGRESS NOTES
[facility] Dermatology Note  Encounter Date: May 6, 2024  Office Visit     Dermatology Problem List:  1. Alopecia areata  - Current tx:  ILK, Clobex shampoo (1-2x a week), H&S shampoo, fluocinolone oil (less since June 2x a month), fluocinonide solution, Rogaine foam (5x a week), Allegra 180 mg morning, cetirizine at night  - Prior: clindamycin lotn PRN  - s/p ILK most recently on 4/12/21, 1/12/21, 10/22/2021, 1/31/22, 6/13/2022, 12/5/2022, 4/11/23, 5/10/2023,  and then every approximate 6 weeks with Dr. Marin, today 12/19/23 with Dr. Brown  - HairMetrix 9/15/20, 4/12/21, 1/31/22, 6/13/22, 12/20/23  2. Psoriasis, managed by [facility]ologist, Dr. Joshi  - Clobetasol ointment   3. Joint pain/pos JESSICA, anti-centromere  - follows with outside rheum      SHx: Enjoys bird watching  ____________________________________________    Assessment & Plan:    # Alopecia areata- chronic, improving.   Patient feels the past month has been not great with keeping up with her routine because she has been busy. Discussed that PO minoxidil could potentially simplify her routing. She is about to go on a trip but potentially interested in PO minoxidil at her return.  - Photos obtained today.  - ILK injections performed today. See procedure note below. Would be cautious with future treatments 2/2 concern for early skin atrophy.   - Continue lidex solution once per week   - Continue with clobetasol shampoo 3x weekly  - Continue topical Rogaine at least 5x weekly  - Future: consider PO minoxidil if interested, she will let us know once she is back from [place] if she would like to start it.   - follow up: CBC, CMP, TSH, vit D.    Procedures Performed:   - Intra-lesional triamcinolone procedure note. After verbal consent and review of risk of pain and skin thinning/atrophy, positioning and cleansing with isopropyl alcohol, 3 total mL of triamcinolone 10 mg/mL was injected into 30 lesion(s) on the scalp. The  "patient tolerated the procedure well and left the dermatology clinic in good condition.    Follow-up: 6mo in-person, or earlier for new or changing lesions    Staff and Resident    Patient was seen and examined with the medicine/dermatology resident. I agree with the history, review of systems, physical examination, assessments and plan. ILK injections were primarily done by me.    Nita Brown MD  Professor   Department of Dermatology  [facility]      ____________________________________________    CC: Hair Loss (AA follow-up: Stable, \"maybe a little more loss\" per patient. Patient notes she has been pretty busy/Scalp is overall a little more itchy.)    HPI:  Ms. Maria C Goodman is a(n) 69 year old female who presents today as a return patient for hair loss.  Patient feels the past month has been not great with keeping up with her routine because she has been busy. Has noted some shedding. She is planning on being more diligent.  Patient is otherwise feeling well, without additional skin concerns.    Labs Reviewed:  N/A    Physical Exam:  SKIN: Focused examination of the scalp, face and hands was performed.  - Diffuse mild decreased density throughout.   - No other lesions of concern on areas examined.     Medications:  Current Outpatient Medications   Medication Sig Dispense Refill    ALBUTEROL IN Inhale into the lungs daily as needed      azelastine (ASTELIN) 0.1 % nasal spray USE 1 TO 2 SPRAYS IN EACH NOSTRIL 1 TO 2 TIMES DAILY AS NEEDED      Calcium Carbonate-Vitamin D (CALCIUM + D PO) Take by mouth daily      cetirizine (ZYRTEC) 10 MG tablet Take 10 mg by mouth daily      Cholecalciferol (VITAMIN D) 1000 UNITS capsule Take 5,000 Units by mouth daily      clobetasol (TEMOVATE) 0.05 % external ointment Use twice daily as needed for psoriasis 60 g 5    clobetasol propionate (CLOBEX) 0.05 % external shampoo APPLY TOPICALLY TO A DRY SCALP, LEAVE ON FOR 10-15 MINUTES, THEN LATHER AND RINSE. DO THIS " EVERY OTHER  mL 11    escitalopram (LEXAPRO) 10 MG tablet Take 15 mg by mouth      famotidine (PEPCID) 10 MG tablet TK 1 T PO QD  1    ferrous gluconate (FERGON) 324 (38 FE) MG tablet One tablet daily 60 tablet 1    fluocinonide (LIDEX) 0.05 % external solution Apply topically 2 times daily as needed (scalp itch) 60 mL 5    levothyroxine (SYNTHROID, LEVOTHROID) 112 MCG tablet Take by mouth daily      montelukast (SINGULAIR) 10 MG tablet Take 10 mg by mouth At Bedtime      triamcinolone acetonide (KENALOG) 10 MG/ML injection See med note 5 mL 0    VITAMIN D PO       Fluocinolone Acetonide Scalp 0.01 % OIL oil Apply topically once a week Use once a week. Apply 1-2 capfuls to dry scalp, massage in and leave on overnight, wash out in the morning (Patient not taking: Reported on 5/6/2024) 118.28 mL 5    ketoconazole (NIZORAL) 2 % shampoo Apply to scalp, lather, then rinse, do every other day alternating with Head & Shoulders (Patient not taking: Reported on 5/6/2024) 120 mL 5    omalizumab (XOLAIR) 150 MG injection Inject 300 mg Subcutaneous      tacrolimus (PROTOPIC) 0.1 % ointment Apply topically 2 times daily (Patient not taking: Reported on 5/6/2024) 60 g 1    tretinoin (RETIN-A) 0.025 % external cream Apply a pea size amount to face daily as tolerated - ok for night time application (Patient not taking: Reported on 5/6/2024) 45 g 1     Current Facility-Administered Medications   Medication Dose Route Frequency Provider Last Rate Last Admin    betamethasone acet & sod phos (CELESTONE) injection 6 mg  6 mg Intramuscular Once Torres Sanchez MD        NONFORMULARY   Does not apply BID Nita Brown MD        triamcinolone acetonide (KENALOG-10) injection 10 mg  10 mg Intra-Lesional Once Nita Brown MD        triamcinolone acetonide (KENALOG-10) injection 10 mg  10 mg Intra-Lesional Once Nita Brown MD        triamcinolone acetonide (KENALOG-10) injection 10 mg   10 mg Other Once Torres Sanchez MD        triamcinolone acetonide (KENALOG-10) injection 10 mg  10 mg Intra-Lesional Once Nita Brown MD        triamcinolone acetonide (KENALOG-10) injection 20 mg  20 mg Intra-Lesional Once Dilia Marin MD        triamcinolone acetonide (KENALOG-10) injection 20 mg  20 mg Intra-Lesional Once Dilia Marin MD        triamcinolone acetonide (KENALOG-10) injection 20 mg  20 mg Intra-Lesional Once Nita Brown MD        triamcinolone acetonide (KENALOG-10) injection 20 mg  20 mg Intra-Lesional Once Nita Brown MD        triamcinolone acetonide (KENALOG-10) injection 24 mg  24 mg Intra-Lesional Once Nita Brown MD        triamcinolone acetonide (KENALOG-10) injection 30 mg  3 mL Intra-Lesional Once Dilia Marin MD        triamcinolone acetonide (KENALOG-10) injection 30 mg  3 mL Intra-Lesional Once Dilia Marin MD        triamcinolone acetonide (KENALOG-10) injection 30 mg  30 mg Intra-Lesional Once Dilia Marin MD        triamcinolone acetonide (KENALOG-10) injection 30 mg  30 mg Intra-Lesional Once Nita Brown MD          Past Medical History:   Patient Active Problem List   Diagnosis    Alopecia areata    Dermatitis    Hypertrichosis    Urticaria    Autoimmune disease (H24)    Dermatitis, seborrheic     Past Medical History:   Diagnosis Date    Asthma     Hypothyroidism     Lobular breast cancer (H)     s/p chemotherapy and radiation, in remission    Multiple allergies         CC No referring provider defined for this encounter. on close of this encounter.

## 2024-05-06 NOTE — LETTER
5/6/2024       RE: Maria C Goodman  [address] [place] [address]     Dear Colleague,    Thank you for referring your patient, Maria C Goodman, to the [facility] [place] [facility]. Please see a copy of my visit note below.    [facility] Dermatology Note  Encounter Date: May 6, 2024  Office Visit     Dermatology Problem List:  1. Alopecia areata  - Current tx:  ILK, Clobex shampoo (1-2x a week), H&S shampoo, fluocinolone oil (less since June 2x a month), fluocinonide solution, Rogaine foam (5x a week), Allegra 180 mg morning, cetirizine at night  - Prior: clindamycin lotn PRN  - s/p ILK most recently on 4/12/21, 1/12/21, 10/22/2021, 1/31/22, 6/13/2022, 12/5/2022, 4/11/23, 5/10/2023,  and then every approximate 6 weeks with Dr. Marin, today 12/19/23 with Dr. Brown  - HairMetrix 9/15/20, 4/12/21, 1/31/22, 6/13/22, 12/20/23  2. Psoriasis, managed by [facility] dermatologist, Dr. Joshi  - Clobetasol ointment   3. Joint pain/pos JESSICA, anti-centromere  - follows with outside rheum      SHx: Enjoys bird watching  ____________________________________________    Assessment & Plan:    # Alopecia areata- chronic, improving.   Patient feels the past month has been not great with keeping up with her routine because she has been busy. Discussed that PO minoxidil could potentially simplify her routing. She is about to go on a trip but potentially interested in PO minoxidil at her return.  - Photos obtained today.  - ILK injections performed today. See procedure note below. Would be cautious with future treatments 2/2 concern for early skin atrophy.   - Continue lidex solution once per week   - Continue with clobetasol shampoo 3x weekly  - Continue topical Rogaine at least 5x weekly  - Future: consider PO minoxidil if interested, she will let us know once she is back from [place] if she would like to start it.   -  "follow up: CBC, CMP, TSH, vit D.    Procedures Performed:   - Intra-lesional triamcinolone procedure note. After verbal consent and review of risk of pain and skin thinning/atrophy, positioning and cleansing with isopropyl alcohol, 3 total mL of triamcinolone 10 mg/mL was injected into 30 lesion(s) on the scalp. The patient tolerated the procedure well and left the dermatology clinic in good condition.    Follow-up: 6mo in-person, or earlier for new or changing lesions    Staff and Resident    Patient was seen and examined with the medicine/dermatology resident. I agree with the history, review of systems, physical examination, assessments and plan. ILK injections were primarily done by me.    Nita Brown MD  Professor   Department of Dermatology  [facility]      ____________________________________________    CC: Hair Loss (AA follow-up: Stable, \"maybe a little more loss\" per patient. Patient notes she has been pretty busy/Scalp is overall a little more itchy.)    HPI:  Ms. Maria C Goodman is a(n) 69 year old female who presents today as a return patient for hair loss.  Patient feels the past month has been not great with keeping up with her routine because she has been busy. Has noted some shedding. She is planning on being more diligent.  Patient is otherwise feeling well, without additional skin concerns.    Labs Reviewed:  N/A    Physical Exam:  SKIN: Focused examination of the scalp, face and hands was performed.  - Diffuse mild decreased density throughout.   - No other lesions of concern on areas examined.     Medications:  Current Outpatient Medications   Medication Sig Dispense Refill     ALBUTEROL IN Inhale into the lungs daily as needed       azelastine (ASTELIN) 0.1 % nasal spray USE 1 TO 2 SPRAYS IN EACH NOSTRIL 1 TO 2 TIMES DAILY AS NEEDED       Calcium Carbonate-Vitamin D (CALCIUM + D PO) Take by mouth daily       cetirizine (ZYRTEC) 10 MG tablet Take 10 mg by mouth daily       " Cholecalciferol (VITAMIN D) 1000 UNITS capsule Take 5,000 Units by mouth daily       clobetasol (TEMOVATE) 0.05 % external ointment Use twice daily as needed for psoriasis 60 g 5     clobetasol propionate (CLOBEX) 0.05 % external shampoo APPLY TOPICALLY TO A DRY SCALP, LEAVE ON FOR 10-15 MINUTES, THEN LATHER AND RINSE. DO THIS EVERY OTHER  mL 11     escitalopram (LEXAPRO) 10 MG tablet Take 15 mg by mouth       famotidine (PEPCID) 10 MG tablet TK 1 T PO QD  1     ferrous gluconate (FERGON) 324 (38 FE) MG tablet One tablet daily 60 tablet 1     fluocinonide (LIDEX) 0.05 % external solution Apply topically 2 times daily as needed (scalp itch) 60 mL 5     levothyroxine (SYNTHROID, LEVOTHROID) 112 MCG tablet Take by mouth daily       montelukast (SINGULAIR) 10 MG tablet Take 10 mg by mouth At Bedtime       triamcinolone acetonide (KENALOG) 10 MG/ML injection See med note 5 mL 0     VITAMIN D PO        Fluocinolone Acetonide Scalp 0.01 % OIL oil Apply topically once a week Use once a week. Apply 1-2 capfuls to dry scalp, massage in and leave on overnight, wash out in the morning (Patient not taking: Reported on 5/6/2024) 118.28 mL 5     ketoconazole (NIZORAL) 2 % shampoo Apply to scalp, lather, then rinse, do every other day alternating with Head & Shoulders (Patient not taking: Reported on 5/6/2024) 120 mL 5     omalizumab (XOLAIR) 150 MG injection Inject 300 mg Subcutaneous       tacrolimus (PROTOPIC) 0.1 % ointment Apply topically 2 times daily (Patient not taking: Reported on 5/6/2024) 60 g 1     tretinoin (RETIN-A) 0.025 % external cream Apply a pea size amount to face daily as tolerated - ok for night time application (Patient not taking: Reported on 5/6/2024) 45 g 1     Current Facility-Administered Medications   Medication Dose Route Frequency Provider Last Rate Last Admin     betamethasone acet & sod phos (CELESTONE) injection 6 mg  6 mg Intramuscular Once Torres Sanchez MD         NONFORMULARY   Does  not apply BID Nita Brown MD         triamcinolone acetonide (KENALOG-10) injection 10 mg  10 mg Intra-Lesional Once Nita Brown MD         triamcinolone acetonide (KENALOG-10) injection 10 mg  10 mg Intra-Lesional Once Nita Brown MD         triamcinolone acetonide (KENALOG-10) injection 10 mg  10 mg Other Once Torres Sanchez MD         triamcinolone acetonide (KENALOG-10) injection 10 mg  10 mg Intra-Lesional Once Nita rBown MD         triamcinolone acetonide (KENALOG-10) injection 20 mg  20 mg Intra-Lesional Once Dilia Marin MD         triamcinolone acetonide (KENALOG-10) injection 20 mg  20 mg Intra-Lesional Once Dilia Marin MD         triamcinolone acetonide (KENALOG-10) injection 20 mg  20 mg Intra-Lesional Once Nita Brown MD         triamcinolone acetonide (KENALOG-10) injection 20 mg  20 mg Intra-Lesional Once Nita Brown MD         triamcinolone acetonide (KENALOG-10) injection 24 mg  24 mg Intra-Lesional Once Nita Brown MD         triamcinolone acetonide (KENALOG-10) injection 30 mg  3 mL Intra-Lesional Once Dilia Marin MD         triamcinolone acetonide (KENALOG-10) injection 30 mg  3 mL Intra-Lesional Once Dilia Marin MD         triamcinolone acetonide (KENALOG-10) injection 30 mg  30 mg Intra-Lesional Once Dilia Marin MD         triamcinolone acetonide (KENALOG-10) injection 30 mg  30 mg Intra-Lesional Once Nita Brown MD          Past Medical History:   Patient Active Problem List   Diagnosis     Alopecia areata     Dermatitis     Hypertrichosis     Urticaria     Autoimmune disease (H24)     Dermatitis, seborrheic     Past Medical History:   Diagnosis Date     Asthma      Hypothyroidism      Lobular breast cancer (H)     s/p chemotherapy and radiation, in remission     Multiple allergies         CC No referring provider defined  for this encounter. on close of this encounter.      Again, thank you for allowing me to participate in the care of your patient.      Sincerely,    Nita Brown MD

## 2024-05-08 ENCOUNTER — TELEPHONE (OUTPATIENT)
Dept: DERMATOLOGY | Facility: CLINIC | Age: 69
End: 2024-05-08
Payer: COMMERCIAL

## 2024-05-08 NOTE — TELEPHONE ENCOUNTER
Left Voicemail (1st Attempt) and Sent Mychart (1st Attempt) for the patient to call back and schedule the following:    Appointment type: follow up  Provider: Stephanie  Return date: 11/2024  Specialty phone number: [phone]  Additional appointment(s) needed: na  Additonal Notes: na

## 2024-05-10 ENCOUNTER — TELEPHONE (OUTPATIENT)
Dept: DERMATOLOGY | Facility: CLINIC | Age: 69
End: 2024-05-10
Payer: COMMERCIAL

## 2024-05-23 DIAGNOSIS — L40.9 PSORIASIS: ICD-10-CM

## 2024-05-24 RX ORDER — CLOBETASOL PROPIONATE 0.5 MG/G
OINTMENT TOPICAL
Qty: 60 G | Refills: 5 | Status: SHIPPED | OUTPATIENT
Start: 2024-05-24

## 2024-06-04 ENCOUNTER — OFFICE VISIT (OUTPATIENT)
Dept: FAMILY MEDICINE | Facility: CLINIC | Age: 69
End: 2024-06-04
Attending: DERMATOLOGY
Payer: COMMERCIAL

## 2024-06-04 VITALS — SYSTOLIC BLOOD PRESSURE: 112 MMHG | DIASTOLIC BLOOD PRESSURE: 64 MMHG

## 2024-06-04 DIAGNOSIS — L63.9 ALOPECIA AREATA: Primary | ICD-10-CM

## 2024-06-04 PROCEDURE — 11901 INJECT SKIN LESIONS >7: CPT | Performed by: DERMATOLOGY

## 2024-06-04 RX ORDER — CLOBETASOL PROPIONATE 0.5 MG/G
OINTMENT TOPICAL 2 TIMES DAILY
Qty: 60 G | Refills: 3 | Status: SHIPPED | OUTPATIENT
Start: 2024-06-04

## 2024-06-04 RX ORDER — MINOXIDIL 2.5 MG/1
TABLET ORAL
Qty: 45 TABLET | Refills: 3 | Status: SHIPPED | OUTPATIENT
Start: 2024-06-04

## 2024-06-04 RX ORDER — CLOBETASOL PROPIONATE 0.5 MG/G
CREAM TOPICAL 2 TIMES DAILY
Qty: 60 G | Refills: 3 | Status: SHIPPED | OUTPATIENT
Start: 2024-06-04

## 2024-06-04 NOTE — PROGRESS NOTES
[facility] Dermatology Note  Encounter Date: Jun 4, 2024  Office Visit     Dermatology Problem List:  1. Alopecia areata  - Current tx:  ILK, Clobex shampoo (1-2x a week), H&S shampoo, fluocinolone oil (less since June 2x a month), fluocinonide solution, Rogaine foam (5x a week), Allegra 180 mg morning, cetirizine at night  - Prior: clindamycin lotn PRN  - s/p ILK most recently on 4/12/21, 1/12/21, 10/22/2021, 1/31/22, 6/13/2022, 12/5/2022, 4/11/23, 5/10/2023,  and then every approximate 6 weeks with Dr. Marin, 12/19/23 with Dr. Brown  - started PO minoxidil 6/4/24  - HairMetrix 9/15/20, 4/12/21, 1/31/22, 6/13/22, 12/20/23  2. Psoriasis, managed by [facility] dermatologist, Dr. Joshi  - Clobetasol ointment   3. Joint pain/pos JESSICA, anti-centromere  - follows with outside rheum      SHx: Enjoys bird watching  ____________________________________________     Assessment & Plan:     # Alopecia areata- chronic, active  She had a great time in [place]. Would like to start PO minoxidil and refilled clobetasol cream/ointment formulations.  - Start PO minoxidil (LONITEN) 2.5 MG tablet , take 1/2 daily; side effects discussed  - ILK injections performed today. See procedure note below. Would be cautious with future treatments 2/2 concern for early skin atrophy.   - continue topicals - has clobetasol shampoo; refilled clobetasol cream/ointment today; has lidex solution; rogaine      Procedures Performed:   - Intra-lesional triamcinolone procedure note. After verbal consent and review of risk of pain and skin thinning/atrophy, positioning and cleansing with isopropyl alcohol, 3 total mL of triamcinolone 10 mg/mL was injected into 30 lesion(s) on the scalp. The patient tolerated the procedure well and left the dermatology clinic in good condition.    Follow-up: Appointment scheduled for 7/2/24    Staff and Scribe:     Scribe Disclosure:   JAZMIN EWING, am serving as a scribe; to document services  personally performed by Dilia Marin MD-based on data collection and the provider's statements to me.      Provider Disclosure:   The documentation recorded by the scribe accurately reflects the services I personally performed and the decisions made by me.    Dilia Marin MD    Department of Dermatology  [facility]: Phone: [phone], Fax: [phone]  6/10/2024     ____________________________________________    CC: Hair Loss    HPI:  Ms. Maria C Goodman is a(n) 69 year old female who presents today as a return patient for hair loss. Last seen in dermatology by Dr. Brown on 5/6/24, at which time alopecia areata was treated with ILK injections and continued on lidex soln, clobetasol shampoo 3x weekly, and rogaine at least 5x weekly.     Today, the patient mentions her hair seems thinner. She is interested in starting the oral minoxidil, which she discussed with Dr. Brown.     Patient is otherwise feeling well, without additional skin concerns.    Labs Reviewed:  N/A    Physical Exam:  Vitals: /64   SKIN: Focused examination of below areas was performed.  - Scalp with more discrete alopecia patch on midline frontal scalp. Compared to prior, improved.  - Bilateral frontal temporal region with decreased density. -  - No perifollicular erythema or scale.  - No other lesions of concern on areas examined.     Medications:  Current Outpatient Medications   Medication Sig Dispense Refill    ALBUTEROL IN Inhale into the lungs daily as needed      azelastine (ASTELIN) 0.1 % nasal spray USE 1 TO 2 SPRAYS IN EACH NOSTRIL 1 TO 2 TIMES DAILY AS NEEDED      Calcium Carbonate-Vitamin D (CALCIUM + D PO) Take by mouth daily      cetirizine (ZYRTEC) 10 MG tablet Take 10 mg by mouth daily      Cholecalciferol (VITAMIN D) 1000 UNITS capsule Take 5,000 Units by mouth daily      clobetasol (TEMOVATE) 0.05 % external  ointment Use twice daily as needed for psoriasis 60 g 5    clobetasol propionate (CLOBEX) 0.05 % external shampoo APPLY TOPICALLY TO A DRY SCALP, LEAVE ON FOR 10-15 MINUTES, THEN LATHER AND RINSE. DO THIS EVERY OTHER  mL 11    escitalopram (LEXAPRO) 10 MG tablet Take 15 mg by mouth      famotidine (PEPCID) 10 MG tablet TK 1 T PO QD  1    ferrous gluconate (FERGON) 324 (38 FE) MG tablet One tablet daily 60 tablet 1    Fluocinolone Acetonide Scalp 0.01 % OIL oil Apply topically once a week Use once a week. Apply 1-2 capfuls to dry scalp, massage in and leave on overnight, wash out in the morning 118.28 mL 5    fluocinonide (LIDEX) 0.05 % external solution Apply topically 2 times daily as needed (scalp itch) 60 mL 5    ketoconazole (NIZORAL) 2 % shampoo Apply to scalp, lather, then rinse, do every other day alternating with Head & Shoulders 120 mL 5    levothyroxine (SYNTHROID, LEVOTHROID) 112 MCG tablet Take by mouth daily      montelukast (SINGULAIR) 10 MG tablet Take 10 mg by mouth At Bedtime      tacrolimus (PROTOPIC) 0.1 % ointment Apply topically 2 times daily 60 g 1    tretinoin (RETIN-A) 0.025 % external cream Apply a pea size amount to face daily as tolerated - ok for night time application 45 g 1    triamcinolone acetonide (KENALOG) 10 MG/ML injection See med note 5 mL 0    VITAMIN D PO       omalizumab (XOLAIR) 150 MG injection Inject 300 mg Subcutaneous       Current Facility-Administered Medications   Medication Dose Route Frequency Provider Last Rate Last Admin    betamethasone acet & sod phos (CELESTONE) injection 6 mg  6 mg Intramuscular Once Torres Sanchez MD        NONFORMULARY   Does not apply BID Nita Brown MD        triamcinolone acetonide (KENALOG-10) injection 10 mg  10 mg Intra-Lesional Once Nita Brown MD        triamcinolone acetonide (KENALOG-10) injection 10 mg  10 mg Intra-Lesional Once Nita Brown MD        triamcinolone  acetonide (KENALOG-10) injection 10 mg  10 mg Other Once Torres Sanchez MD        triamcinolone acetonide (KENALOG-10) injection 10 mg  10 mg Intra-Lesional Once Nita Brown MD        triamcinolone acetonide (KENALOG-10) injection 20 mg  20 mg Intra-Lesional Once Dilia Marin MD        triamcinolone acetonide (KENALOG-10) injection 20 mg  20 mg Intra-Lesional Once Dilia Marin MD        triamcinolone acetonide (KENALOG-10) injection 20 mg  20 mg Intra-Lesional Once Nita Brown MD        triamcinolone acetonide (KENALOG-10) injection 20 mg  20 mg Intra-Lesional Once Nita Brown MD        triamcinolone acetonide (KENALOG-10) injection 24 mg  24 mg Intra-Lesional Once Nita Brown MD        triamcinolone acetonide (KENALOG-10) injection 30 mg  30 mg Intra-Lesional Once         triamcinolone acetonide (KENALOG-10) injection 30 mg  3 mL Intra-Lesional Once Dilia Marin MD        triamcinolone acetonide (KENALOG-10) injection 30 mg  3 mL Intra-Lesional Once Dilia Marin MD        triamcinolone acetonide (KENALOG-10) injection 30 mg  30 mg Intra-Lesional Once Dilia Marin MD        triamcinolone acetonide (KENALOG-10) injection 30 mg  30 mg Intra-Lesional Once Nita Brown MD          Past Medical History:   Patient Active Problem List   Diagnosis    Alopecia areata    Dermatitis    Hypertrichosis    Urticaria    Autoimmune disease (H24)    Dermatitis, seborrheic     Past Medical History:   Diagnosis Date    Asthma     Hypothyroidism     Lobular breast cancer (H)     s/p chemotherapy and radiation, in remission    Multiple allergies         CC Dilia Marin MD  [address] [facility] [place] [address] on close of this encounter.    Clinic Administered Medication Documentation    Patient was given kenalog 10 mg/ml. Prior to medication administration, verified patient's identity  using patient s name and date of birth. Please see MAR and medication order for additional information. Patient instructed to remain in clinic for 15 minutes and report any adverse reaction to staff immediately.    Vial/Syringe: Multi dose vial

## 2024-06-04 NOTE — PATIENT INSTRUCTIONS
Patient Education       Proper skin care from [facility]:    -Eliminate harsh soaps as they strip the natural oils from the skin, often resulting in dry itchy skin ( i.e. Dial, Zest, Sinhala Spring)  -Use mild soaps such as Cetaphil or Dove Sensitive Skin in the shower. You do not need to use soap on arms, legs, and trunk every time you shower unless visibly soiled.   -Avoid hot or cold showers.  -After showering, lightly dry off and apply moisturizing within 2-3 minutes. This will help trap moisture in the skin.   -Aggressive use of a moisturizer at least 1-2 times a day to the entire body (including -Vanicream, Cetaphil, Aquaphor or Cerave) and moisturize hands after every washing.  -We recommend using moisturizers that come in a tub that needs to be scooped out, not a pump. This has more of an oil base. It will hold moisture in your skin much better than a water base moisturizer. The above recommended are non-pore clogging.      Wear a sunscreen with at least SPF 30 on your face, ears, neck and V of the chest daily. Wear sunscreen on other areas of the body if those areas are exposed to the sun throughout the day. Sunscreens can contain physical and/or chemical blockers. Physical blockers are less likely to clog pores, these include zinc oxide and titanium dioxide. Reapply every two hour and after swimming.     Sunscreen examples: https://www.ewg.org/sunscreen/    UV radiation  UVA radiation remains constant throughout the day and throughout the year. It is a longer wavelength than UVB and therefore penetrates deeper into the skin leading to immediate and delayed tanning, photoaging, and skin cancer. 70-80% of UVA and UVB radiation occurs between the hours of 10am-2pm.  UVB radiation  UVB radiation causes the most harmful effects and is more significant during the summer months. However, snow and ice can reflect UVB radiation leading to skin damage during the winter months as well. UVB radiation is  responsible for tanning, burning, inflammation, delayed erythema (pinkness), pigmentation (brown spots), and skin cancer.     I recommend self monthly full body exams and yearly full body exams with a dermatology provider. If you develop a new or changing lesion please follow up for examination. Most skin cancers are pink and scaly or pink and pearly. However, we do see blue/brown/black skin cancers.  Consider the ABCDEs of melanoma when giving yourself your monthly full body exam ( don't forget the groin, buttocks, feet, toes, etc). A-asymmetry, B-borders, C-color, D-diameter, E-elevation or evolving. If you see any of these changes please follow up in clinic. If you cannot see your back I recommend purchasing a hand held mirror to use with a larger wall mirror.       Checking for Skin Cancer  You can find cancer early by checking your skin each month. There are 3 kinds of skin cancer. They are melanoma, basal cell carcinoma, and squamous cell carcinoma. Doing monthly skin checks is the best way to find new marks or skin changes. Follow the instructions below for checking your skin.   The ABCDEs of checking moles for melanoma   Check your moles or growths for signs of melanoma using ABCDE:   Asymmetry: the sides of the mole or growth don t match  Border: the edges are ragged, notched, or blurred  Color: the color within the mole or growth varies  Diameter: the mole or growth is larger than 6 mm (size of a pencil eraser)  Evolving: the size, shape, or color of the mole or growth is changing (evolving is not shown in the images below)    Checking for other types of skin cancer  Basal cell carcinoma or squamous cell carcinoma have symptoms such as:     A spot or mole that looks different from all other marks on your skin  Changes in how an area feels, such as itching, tenderness, or pain  Changes in the skin's surface, such as oozing, bleeding, or scaliness  A sore that does not heal  New swelling or redness beyond  the border of a mole    Who s at risk?  Anyone can get skin cancer. But you are at greater risk if you have:   Fair skin, light-colored hair, or light-colored eyes  Many moles or abnormal moles on your skin  A history of sunburns from sunlight or tanning beds  A family history of skin cancer  A history of exposure to radiation or chemicals  A weakened immune system  If you have had skin cancer in the past, you are at risk for recurring skin cancer.   How to check your skin  Do your monthly skin checkups in front of a full-length mirror. Check all parts of your body, including your:   Head (ears, face, neck, and scalp)  Torso (front, back, and sides)  Arms (tops, undersides, upper, and lower armpits)  Hands (palms, backs, and fingers, including under the nails)  Buttocks and genitals  Legs (front, back, and sides)  Feet (tops, soles, toes, including under the nails, and between toes)  If you have a lot of moles, take digital photos of them each month. Make sure to take photos both up close and from a distance. These can help you see if any moles change over time.   Most skin changes are not cancer. But if you see any changes in your skin, call your doctor right away. Only he or she can diagnose a problem. If you have skin cancer, seeing your doctor can be the first step toward getting the treatment that could save your life.   [employer] last reviewed this educational content on 4/1/2019 2000-2020 [employer]. [address] [place] [address], [place] [address]. All rights reserved. This information is not intended as a substitute for professional medical care. Always follow your healthcare professional's instructions.       When should I call my doctor?  If you are worsening or not improving, please, contact us or seek urgent care as noted below.     Who should I call with questions (adults)?  [facility] (adult and pediatric): [phone]  [facility]  [place] (adult): [phone]  [facility] ([place], [place], [place] and [place]) [phone]  For urgent needs outside of business hours call the [facility] at [phone] and ask for the dermatology resident on call to be paged  If this is a medical emergency and you are unable to reach an ER, Call 911      If you need a prescription refill, please contact your pharmacy. Refills are approved or denied by our Physicians during normal business hours, Monday through Fridays  Per office policy, refills will not be granted if you have not been seen within the past year (or sooner depending on your child's condition)

## 2024-06-04 NOTE — LETTER
6/4/2024      Maria C Goodman  [address] [place] [address]      Dear Colleague,    Thank you for referring your patient, Maria C Goodman, to the [facility]. Please see a copy of my visit note below.    [facility] Dermatology Note  Encounter Date: Jun 4, 2024  Office Visit     Dermatology Problem List:  1. Alopecia areata  - Current tx:  ILK, Clobex shampoo (1-2x a week), H&S shampoo, fluocinolone oil (less since June 2x a month), fluocinonide solution, Rogaine foam (5x a week), Allegra 180 mg morning, cetirizine at night  - Prior: clindamycin lotn PRN  - s/p ILK most recently on 4/12/21, 1/12/21, 10/22/2021, 1/31/22, 6/13/2022, 12/5/2022, 4/11/23, 5/10/2023,  and then every approximate 6 weeks with Dr. Marin, 12/19/23 with Dr. Brown  - started PO minoxidil 6/4/24  - HairMetrix 9/15/20, 4/12/21, 1/31/22, 6/13/22, 12/20/23  2. Psoriasis, managed by [facility] dermatologist, Dr. Joshi  - Clobetasol ointment   3. Joint pain/pos JESSICA, anti-centromere  - follows with outside rheum      SHx: Enjoys bird watching  ____________________________________________     Assessment & Plan:     # Alopecia areata- chronic, active  She had a great time in [place]. Would like to start PO minoxidil and refilled clobetasol cream/ointment formulations.  - Start PO minoxidil (LONITEN) 2.5 MG tablet , take 1/2 daily; side effects discussed  - ILK injections performed today. See procedure note below. Would be cautious with future treatments 2/2 concern for early skin atrophy.   - continue topicals - has clobetasol shampoo; refilled clobetasol cream/ointment today; has lidex solution; rogaine      Procedures Performed:   - Intra-lesional triamcinolone procedure note. After verbal consent and review of risk of pain and skin thinning/atrophy, positioning and cleansing with isopropyl alcohol, 3 total mL of triamcinolone 10 mg/mL was injected into 30 lesion(s) on the scalp. The  patient tolerated the procedure well and left the dermatology clinic in good condition.    Follow-up: Appointment scheduled for 7/2/24    Staff and Scribe:     Scribe Disclosure:   I, JAZMIN LARRY, am serving as a scribe; to document services personally performed by Dilia Marin MD-based on data collection and the provider's statements to me.      Provider Disclosure:   The documentation recorded by the scribe accurately reflects the services I personally performed and the decisions made by me.    Dilia Marin MD    Department of Dermatology  [facility]: Phone: [phone], Fax: [phone]  6/10/2024     ____________________________________________    CC: Hair Loss    HPI:  Ms. Maria C Goodman is a(n) 69 year old female who presents today as a return patient for hair loss. Last seen in dermatology by Dr. Brown on 5/6/24, at which time alopecia areata was treated with ILK injections and continued on lidex soln, clobetasol shampoo 3x weekly, and rogaine at least 5x weekly.     Today, the patient mentions her hair seems thinner. She is interested in starting the oral minoxidil, which she discussed with Dr. Brown.     Patient is otherwise feeling well, without additional skin concerns.    Labs Reviewed:  N/A    Physical Exam:  Vitals: /64   SKIN: Focused examination of below areas was performed.  - Scalp with more discrete alopecia patch on midline frontal scalp. Compared to prior, improved.  - Bilateral frontal temporal region with decreased density. -  - No perifollicular erythema or scale.  - No other lesions of concern on areas examined.     Medications:  Current Outpatient Medications   Medication Sig Dispense Refill     ALBUTEROL IN Inhale into the lungs daily as needed       azelastine (ASTELIN) 0.1 % nasal spray USE 1 TO 2 SPRAYS IN EACH NOSTRIL 1 TO 2 TIMES DAILY AS NEEDED       Calcium  Carbonate-Vitamin D (CALCIUM + D PO) Take by mouth daily       cetirizine (ZYRTEC) 10 MG tablet Take 10 mg by mouth daily       Cholecalciferol (VITAMIN D) 1000 UNITS capsule Take 5,000 Units by mouth daily       clobetasol (TEMOVATE) 0.05 % external ointment Use twice daily as needed for psoriasis 60 g 5     clobetasol propionate (CLOBEX) 0.05 % external shampoo APPLY TOPICALLY TO A DRY SCALP, LEAVE ON FOR 10-15 MINUTES, THEN LATHER AND RINSE. DO THIS EVERY OTHER  mL 11     escitalopram (LEXAPRO) 10 MG tablet Take 15 mg by mouth       famotidine (PEPCID) 10 MG tablet TK 1 T PO QD  1     ferrous gluconate (FERGON) 324 (38 FE) MG tablet One tablet daily 60 tablet 1     Fluocinolone Acetonide Scalp 0.01 % OIL oil Apply topically once a week Use once a week. Apply 1-2 capfuls to dry scalp, massage in and leave on overnight, wash out in the morning 118.28 mL 5     fluocinonide (LIDEX) 0.05 % external solution Apply topically 2 times daily as needed (scalp itch) 60 mL 5     ketoconazole (NIZORAL) 2 % shampoo Apply to scalp, lather, then rinse, do every other day alternating with Head & Shoulders 120 mL 5     levothyroxine (SYNTHROID, LEVOTHROID) 112 MCG tablet Take by mouth daily       montelukast (SINGULAIR) 10 MG tablet Take 10 mg by mouth At Bedtime       tacrolimus (PROTOPIC) 0.1 % ointment Apply topically 2 times daily 60 g 1     tretinoin (RETIN-A) 0.025 % external cream Apply a pea size amount to face daily as tolerated - ok for night time application 45 g 1     triamcinolone acetonide (KENALOG) 10 MG/ML injection See med note 5 mL 0     VITAMIN D PO        omalizumab (XOLAIR) 150 MG injection Inject 300 mg Subcutaneous       Current Facility-Administered Medications   Medication Dose Route Frequency Provider Last Rate Last Admin     betamethasone acet & sod phos (CELESTONE) injection 6 mg  6 mg Intramuscular Once Torres Sanchez MD         NONFORMULARY   Does not apply BID Nita Brown,  MD         triamcinolone acetonide (KENALOG-10) injection 10 mg  10 mg Intra-Lesional Once Nita Brown MD         triamcinolone acetonide (KENALOG-10) injection 10 mg  10 mg Intra-Lesional Once Nita Brown MD         triamcinolone acetonide (KENALOG-10) injection 10 mg  10 mg Other Once Torres Sanchez MD         triamcinolone acetonide (KENALOG-10) injection 10 mg  10 mg Intra-Lesional Once Nita Brown MD         triamcinolone acetonide (KENALOG-10) injection 20 mg  20 mg Intra-Lesional Once Dilia Marin MD         triamcinolone acetonide (KENALOG-10) injection 20 mg  20 mg Intra-Lesional Once Dilia Marin MD         triamcinolone acetonide (KENALOG-10) injection 20 mg  20 mg Intra-Lesional Once Nita Brown MD         triamcinolone acetonide (KENALOG-10) injection 20 mg  20 mg Intra-Lesional Once Nita Brown MD         triamcinolone acetonide (KENALOG-10) injection 24 mg  24 mg Intra-Lesional Once Nita Brown MD         triamcinolone acetonide (KENALOG-10) injection 30 mg  30 mg Intra-Lesional Once          triamcinolone acetonide (KENALOG-10) injection 30 mg  3 mL Intra-Lesional Once Dilia Marin MD         triamcinolone acetonide (KENALOG-10) injection 30 mg  3 mL Intra-Lesional Once Dilia Marin MD         triamcinolone acetonide (KENALOG-10) injection 30 mg  30 mg Intra-Lesional Once Dilia Marin MD         triamcinolone acetonide (KENALOG-10) injection 30 mg  30 mg Intra-Lesional Once Nita Brown MD          Past Medical History:   Patient Active Problem List   Diagnosis     Alopecia areata     Dermatitis     Hypertrichosis     Urticaria     Autoimmune disease (H24)     Dermatitis, seborrheic     Past Medical History:   Diagnosis Date     Asthma      Hypothyroidism      Lobular breast cancer (H)     s/p chemotherapy and radiation, in remission     Multiple  allergies         CC Dilia Marin MD  [address] [facility] [id number]   [place] [address] on close of this encounter.    Clinic Administered Medication Documentation    Patient was given kenalog 10 mg/ml. Prior to medication administration, verified patient's identity using patient s name and date of birth. Please see MAR and medication order for additional information. Patient instructed to remain in clinic for 15 minutes and report any adverse reaction to staff immediately.    Vial/Syringe: Multi dose vial        Again, thank you for allowing me to participate in the care of your patient.        Sincerely,        Dilia Marin MD

## 2024-08-06 ENCOUNTER — OFFICE VISIT (OUTPATIENT)
Dept: DERMATOLOGY | Facility: CLINIC | Age: 69
End: 2024-08-06
Payer: COMMERCIAL

## 2024-08-06 DIAGNOSIS — L63.9 ALOPECIA AREATA: Primary | ICD-10-CM

## 2024-08-06 PROCEDURE — 11901 INJECT SKIN LESIONS >7: CPT | Performed by: DERMATOLOGY

## 2024-08-06 ASSESSMENT — PAIN SCALES - GENERAL: PAINLEVEL: NO PAIN (0)

## 2024-08-06 NOTE — PROGRESS NOTES
[facility] Dermatology Note  Encounter Date: Aug 6, 2024  Office Visit     Dermatology Problem List:  1. Alopecia areata  - Current tx:  ILK, Clobex shampoo (1-2x a week), H&S shampoo, fluocinolone oil (less since June 2x a month), fluocinonide solution, Rogaine foam (5x a week), Allegra 180 mg morning, cetirizine at night  - Prior: clindamycin lotn PRN  - s/p ILK most recently on 4/12/21, 1/12/21, 10/22/2021, 1/31/22, 6/13/2022, 12/5/2022, 4/11/23, 5/10/2023,  and then every approximate 6 weeks with Dr. Marin,  - started PO minoxidil 6/4/24  - HairMetrix 9/15/20, 4/12/21, 1/31/22, 6/13/22, 12/20/23  2. Psoriasis, managed by [facility], Dr. Joshi  - Clobetasol ointment   3. Joint pain/pos JESSICA, anti-centromere  - follows with outside rheum      SHx: Enjoys bird watching  ____________________________________________     Assessment & Plan:     # Alopecia areata- chronic, active, seems to be improving a bit on PO minoxidil!  - Continue PO minoxidil (LONITEN) 2.5 MG tablet , take 1/2 daily  - ILK injections performed today. See procedure note below.  - continue topicals - has clobetasol shampoo; clobetasol cream/ointment; lidex solution; rogaine      Procedures Performed:   - Intra-lesional triamcinolone procedure note. After verbal consent and review of risk of pain and skin thinning/atrophy, positioning and cleansing with isopropyl alcohol, 2.5 total mL of triamcinolone 10 mg/mL was injected into 30 lesion(s) on the scalp. The patient tolerated the procedure well and left the dermatology clinic in good condition.    Follow-up: Appointment scheduled for 10/1/24    Staff and Scribe:     Scribe Disclosure:   JESI EWING, am serving as a scribe; to document services personally performed by Dilia Marin MD -based on data collection and the provider's statements to me.     Provider Disclosure:   The documentation recorded by the scribe accurately reflects the services I personally  performed and the decisions made by me.    Dilia Marin MD    Department of Dermatology  [facility]: Phone: [phone], Fax: [phone]  8/11/2024       ____________________________________________    CC: Hair Loss (Routine follow up, ILK inj)    HPI:  Ms. Maria C Goodman is a(n) 69 year old female who presents today as a return patient for hair loss. Last seen in dermatology by myself on 6/4/24 at which time alopecia areata was treated with ILK injections and continued on lidex soln, clobetasol shampoo 3x weekly, and rogaine at least 5x weekly.     Today, the patient mentions she seems to have noticed some improvement.     Patient is otherwise feeling well, without additional skin concerns.    Labs Reviewed:  N/A    Physical Exam:  Vitals: There were no vitals taken for this visit.  SKIN: Focused examination of below areas was performed.   New alopecia patch on the L mid frontal scalp   - Otherwise improvement particular on the crown of her scalp  - No other lesions of concern on areas examined.     Medications:  Current Outpatient Medications   Medication Sig Dispense Refill    ALBUTEROL IN Inhale into the lungs daily as needed      azelastine (ASTELIN) 0.1 % nasal spray USE 1 TO 2 SPRAYS IN EACH NOSTRIL 1 TO 2 TIMES DAILY AS NEEDED      Calcium Carbonate-Vitamin D (CALCIUM + D PO) Take by mouth daily      cetirizine (ZYRTEC) 10 MG tablet Take 10 mg by mouth daily      Cholecalciferol (VITAMIN D) 1000 UNITS capsule Take 5,000 Units by mouth daily      clobetasol (TEMOVATE) 0.05 % external ointment Apply topically 2 times daily To scalp  as needed 60 g 3    clobetasol propionate (CLOBEX) 0.05 % external shampoo APPLY TOPICALLY TO A DRY SCALP, LEAVE ON FOR 10-15 MINUTES, THEN LATHER AND RINSE. DO THIS EVERY OTHER  mL 11    clobetasol propionate (TEMOVATE) 0.05 % external cream Apply topically 2 times daily To  scalp as needed 60 g 3    escitalopram (LEXAPRO) 10 MG tablet Take 15 mg by mouth      famotidine (PEPCID) 10 MG tablet TK 1 T PO QD  1    ferrous gluconate (FERGON) 324 (38 FE) MG tablet One tablet daily 60 tablet 1    Fluocinolone Acetonide Scalp 0.01 % OIL oil Apply topically once a week Use once a week. Apply 1-2 capfuls to dry scalp, massage in and leave on overnight, wash out in the morning 118.28 mL 5    fluocinonide (LIDEX) 0.05 % external solution Apply topically 2 times daily as needed (scalp itch) 60 mL 5    ketoconazole (NIZORAL) 2 % shampoo Apply to scalp, lather, then rinse, do every other day alternating with Head & Shoulders 120 mL 5    levothyroxine (SYNTHROID, LEVOTHROID) 112 MCG tablet Take by mouth daily      minoxidil (LONITEN) 2.5 MG tablet Take 1/2 tab daily. 45 tablet 3    montelukast (SINGULAIR) 10 MG tablet Take 10 mg by mouth At Bedtime      tacrolimus (PROTOPIC) 0.1 % ointment Apply topically 2 times daily 60 g 1    tretinoin (RETIN-A) 0.025 % external cream Apply a pea size amount to face daily as tolerated - ok for night time application 45 g 1    triamcinolone acetonide (KENALOG) 10 MG/ML injection See med note 5 mL 0    VITAMIN D PO       clobetasol (TEMOVATE) 0.05 % external ointment Use twice daily as needed for psoriasis (Patient not taking: Reported on 8/6/2024) 60 g 5    omalizumab (XOLAIR) 150 MG injection Inject 300 mg Subcutaneous       Current Facility-Administered Medications   Medication Dose Route Frequency Provider Last Rate Last Admin    betamethasone acet & sod phos (CELESTONE) injection 6 mg  6 mg Intramuscular Once Torres Sanchez MD        NONFORMULARY   Does not apply BID Nita Brown MD        triamcinolone acetonide (KENALOG-10) injection 10 mg  10 mg Intra-Lesional Once Nita Brown MD        triamcinolone acetonide (KENALOG-10) injection 10 mg  10 mg Intra-Lesional Once Nita Brown MD        triamcinolone  acetonide (KENALOG-10) injection 10 mg  10 mg Other Once Torres Sanchez MD        triamcinolone acetonide (KENALOG-10) injection 10 mg  10 mg Intra-Lesional Once Nita Brown MD        triamcinolone acetonide (KENALOG-10) injection 20 mg  20 mg Intra-Lesional Once Dilia Marin MD        triamcinolone acetonide (KENALOG-10) injection 20 mg  20 mg Intra-Lesional Once Dilia Marin MD        triamcinolone acetonide (KENALOG-10) injection 20 mg  20 mg Intra-Lesional Once Nita Brown MD        triamcinolone acetonide (KENALOG-10) injection 20 mg  20 mg Intra-Lesional Once Nita Brown MD        triamcinolone acetonide (KENALOG-10) injection 24 mg  24 mg Intra-Lesional Once Nita Brown MD        triamcinolone acetonide (KENALOG-10) injection 30 mg  30 mg Intra-Lesional Once         triamcinolone acetonide (KENALOG-10) injection 30 mg  3 mL Intra-Lesional Once Dilia Marin MD        triamcinolone acetonide (KENALOG-10) injection 30 mg  3 mL Intra-Lesional Once Dilia Marin MD        triamcinolone acetonide (KENALOG-10) injection 30 mg  30 mg Intra-Lesional Once Dilia Marin MD        triamcinolone acetonide (KENALOG-10) injection 30 mg  30 mg Intra-Lesional Once Nita Brown MD          Past Medical History:   Patient Active Problem List   Diagnosis    Alopecia areata    Dermatitis    Hypertrichosis    Urticaria    Autoimmune disease (H24)    Dermatitis, seborrheic     Past Medical History:   Diagnosis Date    Asthma     Hypothyroidism     Lobular breast cancer (H)     s/p chemotherapy and radiation, in remission    Multiple allergies         CC Dilia Marin MD  [address] [place] [address] on close of this encounter.    Clinic Administered Medication Documentation    Patient was given kenalog 10 mg/ml. Prior to medication administration, verified patient's identity  using patient s name and date of birth. Please see MAR and medication order for additional information. Patient instructed to remain in clinic for 15 minutes and report any adverse reaction to staff immediately.    Vial/Syringe: Multi dose vial

## 2024-10-01 ENCOUNTER — OFFICE VISIT (OUTPATIENT)
Dept: DERMATOLOGY | Facility: CLINIC | Age: 69
End: 2024-10-01
Payer: COMMERCIAL

## 2024-10-01 DIAGNOSIS — L63.9 ALOPECIA AREATA: Primary | ICD-10-CM

## 2024-10-01 PROCEDURE — 11901 INJECT SKIN LESIONS >7: CPT | Performed by: DERMATOLOGY

## 2024-10-01 NOTE — PROGRESS NOTES
[facility] Dermatology Note  Encounter Date: Oct 1, 2024  Office Visit     Dermatology Problem List:  1. Alopecia areata  - Current tx:  ILK, Clobex shampoo (1-2x a week), H&S shampoo, fluocinolone oil (less since June 2x a month), fluocinonide solution, Rogaine foam (5x a week), Allegra 180 mg morning, cetirizine at night  - Prior: clindamycin lotn PRN  - s/p ILK most recently on 4/12/21, 1/12/21, 10/22/2021, 1/31/22, 6/13/2022, 12/5/2022, 4/11/23, 5/10/2023,  and then every approximate 6 weeks with Dr. Marin,  - started PO minoxidil 6/4/24  - HairMetrix 9/15/20, 4/12/21, 1/31/22, 6/13/22, 12/20/23  2. Psoriasis, managed by [facility], Dr. Joshi  - Clobetasol ointment   3. Joint pain/pos JESSICA, anti-centromere  - follows with outside rheum        ____________________________________________     Assessment & Plan:     # Alopecia areata- chronic, active, seems to be improving a bit on PO minoxidil  - Continue PO minoxidil 1.25 mg daily  - ILK injections performed 10/01/2024.   - continue topicals - has clobetasol shampoo; clobetasol cream/ointment; lidex solution; rogaine      Procedures Performed:   Kenalog intralesional injection procedure note: After verbal consent and discussion of risks including but not limited to atrophy, pain, and bruising, time out was performed, the patient underwent positioning and the area was prepped with isopropyl alcohol, 2 total cc of Kenalog 10 mg/cc was injected into 20 sites on the scalp.  The patient tolerated the procedure well and left the Dermatology clinic in good condition.       Follow-up: Appointment scheduled for 11/26/24    Staff and Scribe:     Scribe Disclosure:   Jinny EWING, am serving as a scribe; to document services personally performed by Dilia Marin MD -based on data collection and the provider's statements to me.     Provider Disclosure:   The documentation recorded by the scribe accurately reflects the services I  personally performed and the decisions made by me.    Dilia Marin MD    Department of Dermatology  [facility]: Phone: [phone], Fax: [phone]  10/7/2024         ____________________________________________    CC: Procedure (ILK injections for Alopecia areata)    HPI:  Ms. Maria C Goodman is a(n) 69 year old female who presents today as a return patient for hair loss.     The patient reports that she has a spot on her scalp that has been doing better and she physically feels better.     Patient is otherwise feeling well, without additional skin concerns.    Labs Reviewed:  N/A    Physical exam:  Vitals: There were no vitals taken for this visit.  GEN: This is a well developed, well-nourished female in no acute distress, in a pleasant mood.    SKIN: Focused examination of the scalp was performed.  - alopecia patch on central L scalp, L frontal temporal scalp, significantly improved from prior  - No other lesions of concern on areas examined.       Medications:  Current Outpatient Medications   Medication Sig Dispense Refill    ALBUTEROL IN Inhale into the lungs daily as needed      azelastine (ASTELIN) 0.1 % nasal spray USE 1 TO 2 SPRAYS IN EACH NOSTRIL 1 TO 2 TIMES DAILY AS NEEDED      Calcium Carbonate-Vitamin D (CALCIUM + D PO) Take by mouth daily      cetirizine (ZYRTEC) 10 MG tablet Take 10 mg by mouth daily      Cholecalciferol (VITAMIN D) 1000 UNITS capsule Take 5,000 Units by mouth daily      clobetasol (TEMOVATE) 0.05 % external ointment Apply topically 2 times daily To scalp  as needed 60 g 3    clobetasol (TEMOVATE) 0.05 % external ointment Use twice daily as needed for psoriasis 60 g 5    clobetasol propionate (CLOBEX) 0.05 % external shampoo APPLY TOPICALLY TO A DRY SCALP, LEAVE ON FOR 10-15 MINUTES, THEN LATHER AND RINSE. DO THIS EVERY OTHER  mL 11    clobetasol propionate (TEMOVATE) 0.05 %  external cream Apply topically 2 times daily To scalp as needed 60 g 3    escitalopram (LEXAPRO) 10 MG tablet Take 15 mg by mouth      famotidine (PEPCID) 10 MG tablet TK 1 T PO QD  1    ferrous gluconate (FERGON) 324 (38 FE) MG tablet One tablet daily 60 tablet 1    Fluocinolone Acetonide Scalp 0.01 % OIL oil Apply topically once a week Use once a week. Apply 1-2 capfuls to dry scalp, massage in and leave on overnight, wash out in the morning 118.28 mL 5    fluocinonide (LIDEX) 0.05 % external solution Apply topically 2 times daily as needed (scalp itch) 60 mL 5    ketoconazole (NIZORAL) 2 % shampoo Apply to scalp, lather, then rinse, do every other day alternating with Head & Shoulders 120 mL 5    levothyroxine (SYNTHROID, LEVOTHROID) 112 MCG tablet Take by mouth daily      minoxidil (LONITEN) 2.5 MG tablet Take 1/2 tab daily. 45 tablet 3    montelukast (SINGULAIR) 10 MG tablet Take 10 mg by mouth At Bedtime      tacrolimus (PROTOPIC) 0.1 % ointment Apply topically 2 times daily 60 g 1    tretinoin (RETIN-A) 0.025 % external cream Apply a pea size amount to face daily as tolerated - ok for night time application 45 g 1    triamcinolone acetonide (KENALOG) 10 MG/ML injection See med note 5 mL 0    VITAMIN D PO       omalizumab (XOLAIR) 150 MG injection Inject 300 mg Subcutaneous       Current Facility-Administered Medications   Medication Dose Route Frequency Provider Last Rate Last Admin    betamethasone acet & sod phos (CELESTONE) injection 6 mg  6 mg Intramuscular Once Torres Sanchez MD        NONFORMULARY   Does not apply BID Nita Brown MD        triamcinolone acetonide (KENALOG-10) injection 10 mg  10 mg Intra-Lesional Once Nita Brown MD        triamcinolone acetonide (KENALOG-10) injection 10 mg  10 mg Intra-Lesional Once Nita Brown MD        triamcinolone acetonide (KENALOG-10) injection 10 mg  10 mg Other Once Torres Sanchez MD         triamcinolone acetonide (KENALOG-10) injection 10 mg  10 mg Intra-Lesional Once Nita Brown MD        triamcinolone acetonide (KENALOG-10) injection 20 mg  20 mg Intra-Lesional Once Dilia Marin MD        triamcinolone acetonide (KENALOG-10) injection 20 mg  20 mg Intra-Lesional Once Dilia Marin MD        triamcinolone acetonide (KENALOG-10) injection 20 mg  20 mg Intra-Lesional Once Nita Brown MD        triamcinolone acetonide (KENALOG-10) injection 20 mg  20 mg Intra-Lesional Once Nita Brown MD        triamcinolone acetonide (KENALOG-10) injection 24 mg  24 mg Intra-Lesional Once Nita Brown MD        triamcinolone acetonide (KENALOG-10) injection 30 mg  30 mg Intra-Lesional Once         triamcinolone acetonide (KENALOG-10) injection 30 mg  3 mL Intra-Lesional Once Dilia Marin MD        triamcinolone acetonide (KENALOG-10) injection 30 mg  3 mL Intra-Lesional Once Dilia Marin MD        triamcinolone acetonide (KENALOG-10) injection 30 mg  30 mg Intra-Lesional Once Dilia Marin MD        triamcinolone acetonide (KENALOG-10) injection 30 mg  30 mg Intra-Lesional Once Nita Brown MD          Past Medical History:   Patient Active Problem List   Diagnosis    Alopecia areata    Dermatitis    Hypertrichosis    Urticaria    Autoimmune disease (H)    Dermatitis, seborrheic     Past Medical History:   Diagnosis Date    Asthma     Hypothyroidism     Lobular breast cancer (H)     s/p chemotherapy and radiation, in remission    Multiple allergies         CC Dilia Marin MD  [address] [facility] [place] [address] on close of this encounter.    Clinic Administered Medication Documentation    Patient was given kenalog 10 mg/ml. Prior to medication administration, verified patient's identity using patient s name and date of birth. Please see MAR and medication order for additional  information. Patient instructed to remain in clinic for 15 minutes and report any adverse reaction to staff immediately.    Vial/Syringe: Multi dose vial

## 2024-10-01 NOTE — PATIENT INSTRUCTIONS
Proper skin care from [facility]:    -Eliminate harsh soaps as they strip the natural oils from the skin, often resulting in dry itchy skin ( i.e. Dial, Zest, [place] Spring)  -Use mild soaps such as Cetaphil or Dove Sensitive Skin in the shower. You do not need to use soap on arms, legs, and trunk every time you shower unless visibly soiled.   -Avoid hot or cold showers.  -After showering, lightly dry off and apply moisturizing within 2-3 minutes. This will help trap moisture in the skin.   -Aggressive use of a moisturizer at least 1-2 times a day to the entire body (including -Vanicream, Cetaphil, Aquaphor or Cerave) and moisturize hands after every washing.  -We recommend using moisturizers that come in a tub that needs to be scooped out, not a pump. This has more of an oil base. It will hold moisture in your skin much better than a water base moisturizer. The above recommended are non-pore clogging.      Wear a sunscreen with at least SPF 30 on your face, ears, neck and V of the chest daily. Wear sunscreen on other areas of the body if those areas are exposed to the sun throughout the day. Sunscreens can contain physical and/or chemical blockers. Physical blockers are less likely to clog pores, these include zinc oxide and titanium dioxide. Reapply every two hour and after swimming.     Sunscreen examples: https://www.ewg.org/sunscreen/    UV radiation  UVA radiation remains constant throughout the day and throughout the year. It is a longer wavelength than UVB and therefore penetrates deeper into the skin leading to immediate and delayed tanning, photoaging, and skin cancer. 70-80% of UVA and UVB radiation occurs between the hours of 10am-2pm.  UVB radiation  UVB radiation causes the most harmful effects and is more significant during the summer months. However, snow and ice can reflect UVB radiation leading to skin damage during the winter months as well. UVB radiation is responsible for tanning,  burning, inflammation, delayed erythema (pinkness), pigmentation (brown spots), and skin cancer.     I recommend self monthly full body exams and yearly full body exams with a dermatology provider. If you develop a new or changing lesion please follow up for examination. Most skin cancers are pink and scaly or pink and pearly. However, we do see blue/brown/black skin cancers.  Consider the ABCDEs of melanoma when giving yourself your monthly full body exam ( don't forget the groin, buttocks, feet, toes, etc). A-asymmetry, B-borders, C-color, D-diameter, E-elevation or evolving. If you see any of these changes please follow up in clinic. If you cannot see your back I recommend purchasing a hand held mirror to use with a larger wall mirror.       Checking for Skin Cancer  You can find cancer early by checking your skin each month. There are 3 kinds of skin cancer. They are melanoma, basal cell carcinoma, and squamous cell carcinoma. Doing monthly skin checks is the best way to find new marks or skin changes. Follow the instructions below for checking your skin.   The ABCDEs of checking moles for melanoma   Check your moles or growths for signs of melanoma using ABCDE:   Asymmetry: the sides of the mole or growth don t match  Border: the edges are ragged, notched, or blurred  Color: the color within the mole or growth varies  Diameter: the mole or growth is larger than 6 mm (size of a pencil eraser)  Evolving: the size, shape, or color of the mole or growth is changing (evolving is not shown in the images below)    Checking for other types of skin cancer  Basal cell carcinoma or squamous cell carcinoma have symptoms such as:     A spot or mole that looks different from all other marks on your skin  Changes in how an area feels, such as itching, tenderness, or pain  Changes in the skin's surface, such as oozing, bleeding, or scaliness  A sore that does not heal  New swelling or redness beyond the border of a  mole    Who s at risk?  Anyone can get skin cancer. But you are at greater risk if you have:   Fair skin, light-colored hair, or light-colored eyes  Many moles or abnormal moles on your skin  A history of sunburns from sunlight or tanning beds  A family history of skin cancer  A history of exposure to radiation or chemicals  A weakened immune system  If you have had skin cancer in the past, you are at risk for recurring skin cancer.   How to check your skin  Do your monthly skin checkups in front of a full-length mirror. Check all parts of your body, including your:   Head (ears, face, neck, and scalp)  Torso (front, back, and sides)  Arms (tops, undersides, upper, and lower armpits)  Hands (palms, backs, and fingers, including under the nails)  Buttocks and genitals  Legs (front, back, and sides)  Feet (tops, soles, toes, including under the nails, and between toes)  If you have a lot of moles, take digital photos of them each month. Make sure to take photos both up close and from a distance. These can help you see if any moles change over time.   Most skin changes are not cancer. But if you see any changes in your skin, call your doctor right away. Only he or she can diagnose a problem. If you have skin cancer, seeing your doctor can be the first step toward getting the treatment that could save your life.   [employer] last reviewed this educational content on 4/1/2019 2000-2020 [employer]. [address] [place] [address], [place] [address]. All rights reserved. This information is not intended as a substitute for professional medical care. Always follow your healthcare professional's instructions.       When should I call my doctor?  If you are worsening or not improving, please, contact us or seek urgent care as noted below.     Who should I call with questions (adults)?    [facility] [phone]  For urgent needs outside of business hours call the [facility] at  [phone] and ask for the dermatology resident on call to be paged  If this is a medical emergency and you are unable to reach an ER, Call 911      If you need a prescription refill, please contact your pharmacy. Refills are approved or denied by our Physicians during normal business hours, Monday through Fridays  Per office policy, refills will not be granted if you have not been seen within the past year (or sooner depending on your child's condition)

## 2024-10-01 NOTE — LETTER
10/1/2024      Maria C Goodman  [address] [place] [address]      Dear Colleague,    Thank you for referring your patient, Maria C Goodman, to the [facility]. Please see a copy of my visit note below.    [facility] Dermatology Note  Encounter Date: Oct 1, 2024  Office Visit     Dermatology Problem List:  1. Alopecia areata  - Current tx:  ILK, Clobex shampoo (1-2x a week), H&S shampoo, fluocinolone oil (less since June 2x a month), fluocinonide solution, Rogaine foam (5x a week), Allegra 180 mg morning, cetirizine at night  - Prior: clindamycin lotn PRN  - s/p ILK most recently on 4/12/21, 1/12/21, 10/22/2021, 1/31/22, 6/13/2022, 12/5/2022, 4/11/23, 5/10/2023,  and then every approximate 6 weeks with Dr. Marin,  - started PO minoxidil 6/4/24  - HairMetrix 9/15/20, 4/12/21, 1/31/22, 6/13/22, 12/20/23  2. Psoriasis, managed by [facility] dermatologist, Dr. Joshi  - Clobetasol ointment   3. Joint pain/pos JESSICA, anti-centromere  - follows with outside rheum        ____________________________________________     Assessment & Plan:     # Alopecia areata- chronic, active, seems to be improving a bit on PO minoxidil  - Continue PO minoxidil 1.25 mg daily  - ILK injections performed 10/01/2024.   - continue topicals - has clobetasol shampoo; clobetasol cream/ointment; lidex solution; rogaine      Procedures Performed:   Kenalog intralesional injection procedure note: After verbal consent and discussion of risks including but not limited to atrophy, pain, and bruising, time out was performed, the patient underwent positioning and the area was prepped with isopropyl alcohol, 2 total cc of Kenalog 10 mg/cc was injected into 20 sites on the scalp.  The patient tolerated the procedure well and left the Dermatology clinic in good condition.       Follow-up: Appointment scheduled for 11/26/24    Staff and Scribe:     Kassandra Disclosure:   Jinny EWING, am serving as a  scribe; to document services personally performed by Dilia Marin MD -based on data collection and the provider's statements to me.     Provider Disclosure:   The documentation recorded by the scribe accurately reflects the services I personally performed and the decisions made by me.    Dilia Marin MD    Department of Dermatology  [facility]: Phone: [phone], Fax: [phone]  10/7/2024         ____________________________________________    CC: Procedure (ILK injections for Alopecia areata)    HPI:  Ms. Maria C Goodman is a(n) 69 year old female who presents today as a return patient for hair loss.     The patient reports that she has a spot on her scalp that has been doing better and she physically feels better.     Patient is otherwise feeling well, without additional skin concerns.    Labs Reviewed:  N/A    Physical exam:  Vitals: There were no vitals taken for this visit.  GEN: This is a well developed, well-nourished female in no acute distress, in a pleasant mood.    SKIN: Focused examination of the scalp was performed.  - alopecia patch on central L scalp, L frontal temporal scalp, significantly improved from prior  - No other lesions of concern on areas examined.       Medications:  Current Outpatient Medications   Medication Sig Dispense Refill     ALBUTEROL IN Inhale into the lungs daily as needed       azelastine (ASTELIN) 0.1 % nasal spray USE 1 TO 2 SPRAYS IN EACH NOSTRIL 1 TO 2 TIMES DAILY AS NEEDED       Calcium Carbonate-Vitamin D (CALCIUM + D PO) Take by mouth daily       cetirizine (ZYRTEC) 10 MG tablet Take 10 mg by mouth daily       Cholecalciferol (VITAMIN D) 1000 UNITS capsule Take 5,000 Units by mouth daily       clobetasol (TEMOVATE) 0.05 % external ointment Apply topically 2 times daily To scalp  as needed 60 g 3     clobetasol (TEMOVATE) 0.05 % external ointment Use twice daily as  needed for psoriasis 60 g 5     clobetasol propionate (CLOBEX) 0.05 % external shampoo APPLY TOPICALLY TO A DRY SCALP, LEAVE ON FOR 10-15 MINUTES, THEN LATHER AND RINSE. DO THIS EVERY OTHER  mL 11     clobetasol propionate (TEMOVATE) 0.05 % external cream Apply topically 2 times daily To scalp as needed 60 g 3     escitalopram (LEXAPRO) 10 MG tablet Take 15 mg by mouth       famotidine (PEPCID) 10 MG tablet TK 1 T PO QD  1     ferrous gluconate (FERGON) 324 (38 FE) MG tablet One tablet daily 60 tablet 1     Fluocinolone Acetonide Scalp 0.01 % OIL oil Apply topically once a week Use once a week. Apply 1-2 capfuls to dry scalp, massage in and leave on overnight, wash out in the morning 118.28 mL 5     fluocinonide (LIDEX) 0.05 % external solution Apply topically 2 times daily as needed (scalp itch) 60 mL 5     ketoconazole (NIZORAL) 2 % shampoo Apply to scalp, lather, then rinse, do every other day alternating with Head & Shoulders 120 mL 5     levothyroxine (SYNTHROID, LEVOTHROID) 112 MCG tablet Take by mouth daily       minoxidil (LONITEN) 2.5 MG tablet Take 1/2 tab daily. 45 tablet 3     montelukast (SINGULAIR) 10 MG tablet Take 10 mg by mouth At Bedtime       tacrolimus (PROTOPIC) 0.1 % ointment Apply topically 2 times daily 60 g 1     tretinoin (RETIN-A) 0.025 % external cream Apply a pea size amount to face daily as tolerated - ok for night time application 45 g 1     triamcinolone acetonide (KENALOG) 10 MG/ML injection See med note 5 mL 0     VITAMIN D PO        omalizumab (XOLAIR) 150 MG injection Inject 300 mg Subcutaneous       Current Facility-Administered Medications   Medication Dose Route Frequency Provider Last Rate Last Admin     betamethasone acet & sod phos (CELESTONE) injection 6 mg  6 mg Intramuscular Once Torres Sanchez MD         NONFORMULARY   Does not apply BID Nita Brown MD         triamcinolone acetonide (KENALOG-10) injection 10 mg  10 mg Intra-Lesional Once  Nita Brown MD         triamcinolone acetonide (KENALOG-10) injection 10 mg  10 mg Intra-Lesional Once Nita Brown MD         triamcinolone acetonide (KENALOG-10) injection 10 mg  10 mg Other Once Torres Sanchez MD         triamcinolone acetonide (KENALOG-10) injection 10 mg  10 mg Intra-Lesional Once Nita Brown MD         triamcinolone acetonide (KENALOG-10) injection 20 mg  20 mg Intra-Lesional Once Dilia Marin MD         triamcinolone acetonide (KENALOG-10) injection 20 mg  20 mg Intra-Lesional Once Dilia Marin MD         triamcinolone acetonide (KENALOG-10) injection 20 mg  20 mg Intra-Lesional Once Nita Brown MD         triamcinolone acetonide (KENALOG-10) injection 20 mg  20 mg Intra-Lesional Once Nita Brown MD         triamcinolone acetonide (KENALOG-10) injection 24 mg  24 mg Intra-Lesional Once Nita Brown MD         triamcinolone acetonide (KENALOG-10) injection 30 mg  30 mg Intra-Lesional Once          triamcinolone acetonide (KENALOG-10) injection 30 mg  3 mL Intra-Lesional Once Dilia Marin MD         triamcinolone acetonide (KENALOG-10) injection 30 mg  3 mL Intra-Lesional Once Dilia Marin MD         triamcinolone acetonide (KENALOG-10) injection 30 mg  30 mg Intra-Lesional Once Dilia Marin MD         triamcinolone acetonide (KENALOG-10) injection 30 mg  30 mg Intra-Lesional Once Nita Brown MD          Past Medical History:   Patient Active Problem List   Diagnosis     Alopecia areata     Dermatitis     Hypertrichosis     Urticaria     Autoimmune disease (H)     Dermatitis, seborrheic     Past Medical History:   Diagnosis Date     Asthma      Hypothyroidism      Lobular breast cancer (H)     s/p chemotherapy and radiation, in remission     Multiple allergies         CC Dilia Marin MD  [address] [facility] DERMATOLOGY  [facility]   [phone] on close of this encounter.    Clinic Administered Medication Documentation    Patient was given kenalog 10 mg/ml. Prior to medication administration, verified patient's identity using patient s name and date of birth. Please see MAR and medication order for additional information. Patient instructed to remain in clinic for 15 minutes and report any adverse reaction to staff immediately.    Vial/Syringe: Multi dose vial        Again, thank you for allowing me to participate in the care of your patient.        Sincerely,        Dilia Marin MD

## 2024-11-16 ENCOUNTER — HEALTH MAINTENANCE LETTER (OUTPATIENT)
Age: 69
End: 2024-11-16

## 2025-01-14 ENCOUNTER — OFFICE VISIT (OUTPATIENT)
Dept: DERMATOLOGY | Facility: CLINIC | Age: 70
End: 2025-01-14
Payer: COMMERCIAL

## 2025-01-14 VITALS — OXYGEN SATURATION: 95 % | DIASTOLIC BLOOD PRESSURE: 71 MMHG | SYSTOLIC BLOOD PRESSURE: 115 MMHG | HEART RATE: 88 BPM

## 2025-01-14 DIAGNOSIS — M35.9 AUTOIMMUNE DISEASE: ICD-10-CM

## 2025-01-14 DIAGNOSIS — L63.9 ALOPECIA AREATA: Primary | ICD-10-CM

## 2025-01-14 PROCEDURE — 11901 INJECT SKIN LESIONS >7: CPT | Performed by: DERMATOLOGY

## 2025-01-14 RX ORDER — ROSUVASTATIN CALCIUM 10 MG/1
10 TABLET, COATED ORAL DAILY
COMMUNITY
Start: 2024-11-05

## 2025-01-14 NOTE — PROGRESS NOTES
[facility] Dermatology Note  Encounter Date: Jan 14, 2025  Office Visit     Dermatology Problem List:  1.   Alopecia areata  - Current tx:  ILK, Clobex shampoo (1-2x a week), oral minoxidil 1.25 mg daily  - Prior: clindamycin lotn PRN, , H&S shampoo, fluocinolone oil (less since June 2x a month), fluocinonide solution, Rogaine foam (5x a week), Allegra 180 mg morning, cetirizine at night  - s/p ILK most recently on 4/12/21, 1/12/21, 10/22/2021, 1/31/22, 6/13/2022, 12/5/2022, 4/11/23, 5/10/2023,  and then every approximate 6 weeks with Dr. Marin, 1/14/25  - started PO minoxidil 6/4/24  - HairMetrix 9/15/20, 4/12/21, 1/31/22, 6/13/22, 12/20/23  2. Psoriasis, managed by [facility]ologist, Dr. Joshi  - Clobetasol ointment   3. Joint pain/pos JESSICA, anti-centromere  - follows with outside rheum   ____________________________________________    Assessment & Plan:     # Alopecia areata- chronic, active, seems to be improving  some on po minoxidil  - Continue PO minoxidil 1.25 mg daily  - continue clobetasol shampoo once a week  - ILK injections performed 10/01/2024, 1/14/25  - discontinued clobetasol cream/ointment; lidex solution; rogaine        Procedures Performed:   - Intra-lesional triamcinolone procedure note. After verbal consent and review of risk of pain and skin thinning/atrophy, positioning and cleansing with isopropyl alcohol, 2  total mL of triamcinolone 10 mg/mL was injected into  20 lesion(s) on the scalp. The patient tolerated the procedure well and left the dermatology clinic in good condition.      Follow-up: 5 month(s) in-person, or earlier for new or changing lesions    Staff and Scribe:     Scribe Disclosure:   I, Lela Burden, am serving as a scribe; to document services personally performed by Nita Brown MD -based on data collection and the provider's statements to me.     Provider Disclosure:  I agree with above History, Review of Systems, Physical exam and  Plan.  I have reviewed the content of the documentation and have edited it as needed. I have personally performed the services documented here and the documentation accurately represents those services and the decisions I have made.      Electronically signed by:  Nita Brown MD  Professor   Department of Dermatology  [facility]     ____________________________________________    CC: Hair Loss ( follow-up, AOC- none )    HPI:  Ms. Maria C Goodman is a 69 year old female who presents as a return patient for follow-up of hair loss, diagnosed as Alopecia areata .   - Last seen in-clinic on 10/1/24 by Dr. Concepcion Marin   - Shedding or thinning, or both: none  - Current tx: oral minoxidil 1.25 mg daily; clobetasol shampoo once a week; clobetasol cream/ointment not using; lidex solution not using; rogaine not using, otc shampoo three times a week    no Any new medications, supplements, or products? (please list below)     no Scalp pain   no Scalp burning   no Scalp itching    no Eyebrow changes    no Eyelash changes   no Beard changes    no Other body hair changes    no Nail changes    no Additional symptoms? (please list below)     - Overall course: hair loss has been stable and improving. She started the oral minoxidil in the late summer. Denies any swelling in her hands and feet.      Patient is otherwise feeling well, in usual state of health, and has no additional skin concerns today.     Labs:  None reviewed.    Physical Exam:  Vitals: /71   Pulse 88   SpO2 95%   GEN: Well developed, well-nourished, in no acute distress, in a pleasant mood.    SKIN: Focused examination of face and scalp was performed.    - no diffuse erythema   - no perifollicular erythema  - no perifollicular scale   - no scaling of the scalp   - negative hair pull test   - normal eyelash density  - normal eyebrow density  - no nail pitting or dystrophy   - no scalp folliculitis/pustules   - In comparison to prior  photographs, 5/6/24; increase hair density, sparse hair in vertex region with decrease density, mid scalp improvement with hair growth. Left side significant improvement with short fibers   - No other lesions of concern on areas examined.     Medications:  Current Outpatient Medications   Medication Sig Dispense Refill    ALBUTEROL IN Inhale into the lungs daily as needed      azelastine (ASTELIN) 0.1 % nasal spray USE 1 TO 2 SPRAYS IN EACH NOSTRIL 1 TO 2 TIMES DAILY AS NEEDED      Calcium Carbonate-Vitamin D (CALCIUM + D PO) Take by mouth daily      cetirizine (ZYRTEC) 10 MG tablet Take 10 mg by mouth daily      Cholecalciferol (VITAMIN D) 1000 UNITS capsule Take 5,000 Units by mouth daily      clobetasol (TEMOVATE) 0.05 % external ointment Apply topically 2 times daily To scalp  as needed 60 g 3    clobetasol (TEMOVATE) 0.05 % external ointment Use twice daily as needed for psoriasis 60 g 5    clobetasol propionate (CLOBEX) 0.05 % external shampoo APPLY TOPICALLY TO A DRY SCALP, LEAVE ON FOR 10-15 MINUTES, THEN LATHER AND RINSE. DO THIS EVERY OTHER  mL 11    clobetasol propionate (TEMOVATE) 0.05 % external cream Apply topically 2 times daily To scalp as needed 60 g 3    escitalopram (LEXAPRO) 10 MG tablet Take 15 mg by mouth      famotidine (PEPCID) 10 MG tablet TK 1 T PO QD  1    ferrous gluconate (FERGON) 324 (38 FE) MG tablet One tablet daily 60 tablet 1    Fluocinolone Acetonide Scalp 0.01 % OIL oil Apply topically once a week Use once a week. Apply 1-2 capfuls to dry scalp, massage in and leave on overnight, wash out in the morning 118.28 mL 5    fluocinonide (LIDEX) 0.05 % external solution Apply topically 2 times daily as needed (scalp itch) 60 mL 5    ketoconazole (NIZORAL) 2 % shampoo Apply to scalp, lather, then rinse, do every other day alternating with Head & Shoulders 120 mL 5    levothyroxine (SYNTHROID, LEVOTHROID) 112 MCG tablet Take by mouth daily      minoxidil (LONITEN) 2.5 MG tablet Take  1/2 tab daily. 45 tablet 3    montelukast (SINGULAIR) 10 MG tablet Take 10 mg by mouth At Bedtime      rosuvastatin (CRESTOR) 10 MG tablet Take 10 mg by mouth daily.      tacrolimus (PROTOPIC) 0.1 % ointment Apply topically 2 times daily 60 g 1    tretinoin (RETIN-A) 0.025 % external cream Apply a pea size amount to face daily as tolerated - ok for night time application 45 g 1    triamcinolone acetonide (KENALOG) 10 MG/ML injection See med note 5 mL 0    VITAMIN D PO       omalizumab (XOLAIR) 150 MG injection Inject 300 mg Subcutaneous       Current Facility-Administered Medications   Medication Dose Route Frequency Provider Last Rate Last Admin    betamethasone acet & sod phos (CELESTONE) injection 6 mg  6 mg Intramuscular Once Torres Sanchez MD        NONFORMULARY   Does not apply BID Nita Brown MD        triamcinolone acetonide (KENALOG-10) injection 10 mg  10 mg Intra-Lesional Once Nita Brown MD        triamcinolone acetonide (KENALOG-10) injection 10 mg  10 mg Intra-Lesional Once Nita Brown MD        triamcinolone acetonide (KENALOG-10) injection 10 mg  10 mg Other Once Torres Sanchez MD        triamcinolone acetonide (KENALOG-10) injection 10 mg  10 mg Intra-Lesional Once Nita Brown MD        triamcinolone acetonide (KENALOG-10) injection 20 mg  20 mg Intra-Lesional Once Dilia Marin MD        triamcinolone acetonide (KENALOG-10) injection 20 mg  20 mg Intra-Lesional Once Dilia Marin MD        triamcinolone acetonide (KENALOG-10) injection 20 mg  20 mg Intra-Lesional Once Nita Brown MD        triamcinolone acetonide (KENALOG-10) injection 20 mg  20 mg Intra-Lesional Once Nita Brown MD        triamcinolone acetonide (KENALOG-10) injection 24 mg  24 mg Intra-Lesional Once Nita Brown MD        triamcinolone acetonide (KENALOG-10) injection 30 mg  30 mg Intra-Lesional  Once         triamcinolone acetonide (KENALOG-10) injection 30 mg  3 mL Intra-Lesional Once Dilia Marin MD        triamcinolone acetonide (KENALOG-10) injection 30 mg  3 mL Intra-Lesional Once Dilia Marin MD        triamcinolone acetonide (KENALOG-10) injection 30 mg  30 mg Intra-Lesional Once Dilia Marin MD        triamcinolone acetonide (KENALOG-10) injection 30 mg  30 mg Intra-Lesional Once Nita Brown MD          Past Medical History:   Patient Active Problem List   Diagnosis    Alopecia areata    Dermatitis    Hypertrichosis    Urticaria    Autoimmune disease    Dermatitis, seborrheic     Past Medical History:   Diagnosis Date    Asthma     Hypothyroidism     Lobular breast cancer (H)     s/p chemotherapy and radiation, in remission    Multiple allergies        CC Referred Self, MD  No address on file on close of this encounter.

## 2025-01-14 NOTE — NURSING NOTE
Drug Administration Record    Prior to injection, verified patient identity using patient's name and date of birth.  Due to injection administration, patient instructed to remain in clinic for 15 minutes  afterwards, and to report any adverse reaction to me immediately.    Drug Name: triamcinolone acetonide(kenalog)  Dose: 2 mL  Route administered: IL  NDC #: [id number]  Amount of waste(mL):See procedure note  Reason for waste: Multi dose vial    LOT #: [id number]  SITE: see procedure note   : [employer]  EXPIRATION DATE: 3/30/2026

## 2025-01-14 NOTE — LETTER
1/14/2025      Maria C Goodman  [address] [place] [address]      Dear Colleague,    Thank you for referring your patient, Maria C Goodman, to the [facility]. Please see a copy of my visit note below.    [facility] Dermatology Note  Encounter Date: Jan 14, 2025  Office Visit     Dermatology Problem List:  1.   Alopecia areata  - Current tx:  ILK, Clobex shampoo (1-2x a week), oral minoxidil 1.25 mg daily  - Prior: clindamycin lotn PRN, , H&S shampoo, fluocinolone oil (less since June 2x a month), fluocinonide solution, Rogaine foam (5x a week), Allegra 180 mg morning, cetirizine at night  - s/p ILK most recently on 4/12/21, 1/12/21, 10/22/2021, 1/31/22, 6/13/2022, 12/5/2022, 4/11/23, 5/10/2023,  and then every approximate 6 weeks with Dr. Marin, 1/14/25  - started PO minoxidil 6/4/24  - HairMetrix 9/15/20, 4/12/21, 1/31/22, 6/13/22, 12/20/23  2. Psoriasis, managed by [facility] dermatologist, Dr. Joshi  - Clobetasol ointment   3. Joint pain/pos JESSICA, anti-centromere  - follows with outside rheum   ____________________________________________    Assessment & Plan:     # Alopecia areata- chronic, active, seems to be improving  some on po minoxidil  - Continue PO minoxidil 1.25 mg daily  - continue clobetasol shampoo once a week  - ILK injections performed 10/01/2024, 1/14/25  - discontinued clobetasol cream/ointment; lidex solution; rogaine        Procedures Performed:   - Intra-lesional triamcinolone procedure note. After verbal consent and review of risk of pain and skin thinning/atrophy, positioning and cleansing with isopropyl alcohol, 2  total mL of triamcinolone 10 mg/mL was injected into  20 lesion(s) on the scalp. The patient tolerated the procedure well and left the dermatology clinic in good condition.      Follow-up: 5 month(s) in-person, or earlier for new or changing lesions    Staff and Scribe:     Scribe Disclosure:   Lela EWING, am serving  as a scribe; to document services personally performed by Nita Brown MD -based on data collection and the provider's statements to me.     Provider Disclosure:  I agree with above History, Review of Systems, Physical exam and Plan.  I have reviewed the content of the documentation and have edited it as needed. I have personally performed the services documented here and the documentation accurately represents those services and the decisions I have made.      Electronically signed by:  Nita Brown MD  Professor   Department of Dermatology  [facility]     ____________________________________________    CC: Hair Loss (HL follow-up, AOC- none )    HPI:  Ms. Maria C Goodman is a 69 year old female who presents as a return patient for follow-up of hair loss, diagnosed as Alopecia areata .   - Last seen in-clinic on 10/1/24 by Dr. Concepcion Marin   - Shedding or thinning, or both: none  - Current tx: oral minoxidil 1.25 mg daily; clobetasol shampoo once a week; clobetasol cream/ointment not using; lidex solution not using; rogaine not using, otc shampoo three times a week    no Any new medications, supplements, or products? (please list below)     no Scalp pain   no Scalp burning   no Scalp itching    no Eyebrow changes    no Eyelash changes   no Beard changes    no Other body hair changes    no Nail changes    no Additional symptoms? (please list below)     - Overall course: hair loss has been stable and improving. She started the oral minoxidil in the late summer. Denies any swelling in her hands and feet.      Patient is otherwise feeling well, in usual state of health, and has no additional skin concerns today.     Labs:  None reviewed.    Physical Exam:  Vitals: /71   Pulse 88   SpO2 95%   GEN: Well developed, well-nourished, in no acute distress, in a pleasant mood.    SKIN: Focused examination of face and scalp was performed.    - no diffuse erythema   - no perifollicular  erythema  - no perifollicular scale   - no scaling of the scalp   - negative hair pull test   - normal eyelash density  - normal eyebrow density  - no nail pitting or dystrophy   - no scalp folliculitis/pustules   - In comparison to prior photographs, 5/6/24; increase hair density, sparse hair in vertex region with decrease density, mid scalp improvement with hair growth. Left side significant improvement with short fibers   - No other lesions of concern on areas examined.     Medications:  Current Outpatient Medications   Medication Sig Dispense Refill    ALBUTEROL IN Inhale into the lungs daily as needed      azelastine (ASTELIN) 0.1 % nasal spray USE 1 TO 2 SPRAYS IN EACH NOSTRIL 1 TO 2 TIMES DAILY AS NEEDED      Calcium Carbonate-Vitamin D (CALCIUM + D PO) Take by mouth daily      cetirizine (ZYRTEC) 10 MG tablet Take 10 mg by mouth daily      Cholecalciferol (VITAMIN D) 1000 UNITS capsule Take 5,000 Units by mouth daily      clobetasol (TEMOVATE) 0.05 % external ointment Apply topically 2 times daily To scalp  as needed 60 g 3    clobetasol (TEMOVATE) 0.05 % external ointment Use twice daily as needed for psoriasis 60 g 5    clobetasol propionate (CLOBEX) 0.05 % external shampoo APPLY TOPICALLY TO A DRY SCALP, LEAVE ON FOR 10-15 MINUTES, THEN LATHER AND RINSE. DO THIS EVERY OTHER  mL 11    clobetasol propionate (TEMOVATE) 0.05 % external cream Apply topically 2 times daily To scalp as needed 60 g 3    escitalopram (LEXAPRO) 10 MG tablet Take 15 mg by mouth      famotidine (PEPCID) 10 MG tablet TK 1 T PO QD  1    ferrous gluconate (FERGON) 324 (38 FE) MG tablet One tablet daily 60 tablet 1    Fluocinolone Acetonide Scalp 0.01 % OIL oil Apply topically once a week Use once a week. Apply 1-2 capfuls to dry scalp, massage in and leave on overnight, wash out in the morning 118.28 mL 5    fluocinonide (LIDEX) 0.05 % external solution Apply topically 2 times daily as needed (scalp itch) 60 mL 5    ketoconazole  (NIZORAL) 2 % shampoo Apply to scalp, lather, then rinse, do every other day alternating with Head & Shoulders 120 mL 5    levothyroxine (SYNTHROID, LEVOTHROID) 112 MCG tablet Take by mouth daily      minoxidil (LONITEN) 2.5 MG tablet Take 1/2 tab daily. 45 tablet 3    montelukast (SINGULAIR) 10 MG tablet Take 10 mg by mouth At Bedtime      rosuvastatin (CRESTOR) 10 MG tablet Take 10 mg by mouth daily.      tacrolimus (PROTOPIC) 0.1 % ointment Apply topically 2 times daily 60 g 1    tretinoin (RETIN-A) 0.025 % external cream Apply a pea size amount to face daily as tolerated - ok for night time application 45 g 1    triamcinolone acetonide (KENALOG) 10 MG/ML injection See med note 5 mL 0    VITAMIN D PO       omalizumab (XOLAIR) 150 MG injection Inject 300 mg Subcutaneous       Current Facility-Administered Medications   Medication Dose Route Frequency Provider Last Rate Last Admin    betamethasone acet & sod phos (CELESTONE) injection 6 mg  6 mg Intramuscular Once Torres Sanchez MD        NONFORMULARY   Does not apply BID Nita Brown MD        triamcinolone acetonide (KENALOG-10) injection 10 mg  10 mg Intra-Lesional Once Nita Brown MD        triamcinolone acetonide (KENALOG-10) injection 10 mg  10 mg Intra-Lesional Once Nita Brown MD        triamcinolone acetonide (KENALOG-10) injection 10 mg  10 mg Other Once Torres Sanchez MD        triamcinolone acetonide (KENALOG-10) injection 10 mg  10 mg Intra-Lesional Once Nita Brown MD        triamcinolone acetonide (KENALOG-10) injection 20 mg  20 mg Intra-Lesional Once Dilia Marin MD        triamcinolone acetonide (KENALOG-10) injection 20 mg  20 mg Intra-Lesional Once Dilia Marin MD        triamcinolone acetonide (KENALOG-10) injection 20 mg  20 mg Intra-Lesional Once Nita Brown MD        triamcinolone acetonide (KENALOG-10) injection 20 mg  20 mg  Intra-Lesional Once Ntia Brown MD        triamcinolone acetonide (KENALOG-10) injection 24 mg  24 mg Intra-Lesional Once Nita Brown MD        triamcinolone acetonide (KENALOG-10) injection 30 mg  30 mg Intra-Lesional Once         triamcinolone acetonide (KENALOG-10) injection 30 mg  3 mL Intra-Lesional Once Dilia Marin MD        triamcinolone acetonide (KENALOG-10) injection 30 mg  3 mL Intra-Lesional Once Dilia Marin MD        triamcinolone acetonide (KENALOG-10) injection 30 mg  30 mg Intra-Lesional Once Dilia Marin MD        triamcinolone acetonide (KENALOG-10) injection 30 mg  30 mg Intra-Lesional Once Nita Brown MD          Past Medical History:   Patient Active Problem List   Diagnosis    Alopecia areata    Dermatitis    Hypertrichosis    Urticaria    Autoimmune disease    Dermatitis, seborrheic     Past Medical History:   Diagnosis Date    Asthma     Hypothyroidism     Lobular breast cancer (H)     s/p chemotherapy and radiation, in remission    Multiple allergies          Again, thank you for allowing me to participate in the care of your patient.      Sincerely,    Nita Brown MD    Electronically signed

## 2025-02-22 NOTE — PROGRESS NOTES
[facility] Dermatology Note  Encounter Date: Feb 25, 2025  Office Visit     Dermatology Problem List:  1. Alopecia areata  - Current tx:  started PO minoxidil 6/4/24, ILK, Clobex shampoo (1-2x a week), H&S shampoo, fluocinolone oil (less since June 2x a month), fluocinonide solution, Rogaine foam (5x a week), Allegra 180 mg morning, cetirizine at night  - Prior: clindamycin lotn PRN  - s/p ILK most recently on 4/12/21, 1/12/21, 10/22/2021, 1/31/22, 6/13/2022, 12/5/2022, 4/11/23, 5/10/2023,  and then every approximate 6 weeks with Dr. Marin,  - HairMetrix 9/15/20, 4/12/21, 1/31/22, 6/13/22, 12/20/23  2. Psoriasis, managed by [facility] dermatologist, Dr. Joshi  - Clobetasol ointment   3. Joint pain/pos JESSICA, anti-centromere  - follows with outside rheum   ____________________________________________     Assessment & Plan:     # Alopecia areata- chronic, active, seems to be improving nicely on PO minoxidil  - Continue PO minoxidil 1.25 mg daily  - ILK injections performed 02/25/2025.   - continue topicals - has clobetasol shampoo; clobetasol cream/ointment; lidex solution; rogaine    Procedures Performed:   Kenalog intralesional injection procedure note: After verbal consent and discussion of risks including but not limited to atrophy, pain, and bruising, time out was performed, the patient underwent positioning and the area was prepped with isopropyl alcohol, 2 total cc of Kenalog 10 mg/cc was injected into 20 sites on the scalp.  The patient tolerated the procedure well and left the Dermatology clinic in good condition.       Follow-up: Appointment scheduled for 04/08/2025    Staff and Scribe:     Scribe Disclosure:   Jinny EWING, am serving as a scribe; to document services personally performed by Dilia Marin MD -based on data collection and the provider's statements to me.     Provider Disclosure:   The documentation recorded by the scribe accurately reflects the services I personally  performed and the decisions made by me.    Dilia Marin MD    Department of Dermatology  [facility]: Phone: [phone], Fax: [phone]  3/3/2025         ____________________________________________    CC: Hair/Scalp Problem (ILK injections)    HPI:  Ms. Maria C Goodman is a(n) 69 year old female who presents today as a return patient for hair loss.     The patient reports that she is doing well.   She has been spending lots of time birding this winter.   She feels like her hair is feeling baird. She thinks this is bc of minoxidil.   She met with Dr. Nix. Their plan is continue with minoxidil and ILK for 1 year.  She shares her scalp has been feeling a bit itchy.      Patient is otherwise feeling well, without additional skin concerns.    Labs Reviewed:  N/A    Physical exam:  Vitals: There were no vitals taken for this visit.  GEN: This is a well developed, well-nourished female in no acute distress, in a pleasant mood.    SKIN: Focused examination of the below was performed.  - decreased density midline scalp, vertex and 1 slightly more discreet patch on L temporal scalp. Overall improved compared to prior.   - No other lesions of concern on areas examined.     Medications:  Current Outpatient Medications   Medication Sig Dispense Refill    ALBUTEROL IN Inhale into the lungs daily as needed      azelastine (ASTELIN) 0.1 % nasal spray USE 1 TO 2 SPRAYS IN EACH NOSTRIL 1 TO 2 TIMES DAILY AS NEEDED      Calcium Carbonate-Vitamin D (CALCIUM + D PO) Take by mouth daily      cetirizine (ZYRTEC) 10 MG tablet Take 10 mg by mouth daily      Cholecalciferol (VITAMIN D) 1000 UNITS capsule Take 5,000 Units by mouth daily      clobetasol (TEMOVATE) 0.05 % external ointment Apply topically 2 times daily To scalp  as needed 60 g 3    clobetasol (TEMOVATE) 0.05 % external ointment Use twice daily as needed for psoriasis 60 g  5    clobetasol propionate (CLOBEX) 0.05 % external shampoo APPLY TOPICALLY TO A DRY SCALP, LEAVE ON FOR 10-15 MINUTES, THEN LATHER AND RINSE. DO THIS EVERY OTHER  mL 11    clobetasol propionate (TEMOVATE) 0.05 % external cream Apply topically 2 times daily To scalp as needed 60 g 3    EPINEPHrine (ANY BX GENERIC EQUIV) 0.3 MG/0.3ML injection 2-pack Inject into the lateral thigh if needed for life threatening allergic reactions.      escitalopram (LEXAPRO) 10 MG tablet Take 15 mg by mouth      famotidine (PEPCID) 10 MG tablet TK 1 T PO QD  1    ferrous gluconate (FERGON) 324 (38 FE) MG tablet One tablet daily 60 tablet 1    Fluocinolone Acetonide Scalp 0.01 % OIL oil Apply topically once a week Use once a week. Apply 1-2 capfuls to dry scalp, massage in and leave on overnight, wash out in the morning 118.28 mL 5    fluocinonide (LIDEX) 0.05 % external solution Apply topically 2 times daily as needed (scalp itch) 60 mL 5    ketoconazole (NIZORAL) 2 % shampoo Apply to scalp, lather, then rinse, do every other day alternating with Head & Shoulders 120 mL 5    levothyroxine (SYNTHROID, LEVOTHROID) 112 MCG tablet Take by mouth daily      minoxidil (LONITEN) 2.5 MG tablet Take 1/2 tab daily. 45 tablet 3    montelukast (SINGULAIR) 10 MG tablet Take 10 mg by mouth At Bedtime      rosuvastatin (CRESTOR) 10 MG tablet Take 10 mg by mouth daily.      tacrolimus (PROTOPIC) 0.1 % ointment Apply topically 2 times daily 60 g 1    tretinoin (RETIN-A) 0.025 % external cream Apply a pea size amount to face daily as tolerated - ok for night time application 45 g 1    triamcinolone acetonide (KENALOG) 10 MG/ML injection See med note 5 mL 0    VITAMIN D PO       omalizumab (XOLAIR) 150 MG injection Inject 300 mg Subcutaneous       Current Facility-Administered Medications   Medication Dose Route Frequency Provider Last Rate Last Admin    betamethasone acet & sod phos (CELESTONE) injection 6 mg  6 mg Intramuscular Once Laura  Torres LASSITER MD        NONFORMULARY   Does not apply BID Nita Brown MD        triamcinolone acetonide (KENALOG-10) injection 10 mg  10 mg Intra-Lesional Once Nita Brown MD        triamcinolone acetonide (KENALOG-10) injection 10 mg  10 mg Intra-Lesional Once Nita Brown MD        triamcinolone acetonide (KENALOG-10) injection 10 mg  10 mg Other Once Torres Sanchez MD        triamcinolone acetonide (KENALOG-10) injection 10 mg  10 mg Intra-Lesional Once Nita Brown MD        triamcinolone acetonide (KENALOG-10) injection 20 mg  20 mg Intra-Lesional Once Dilia Marin MD        triamcinolone acetonide (KENALOG-10) injection 20 mg  20 mg Intra-Lesional Once Dilia Marin MD        triamcinolone acetonide (KENALOG-10) injection 20 mg  20 mg Intra-Lesional Once Nita Brown MD        triamcinolone acetonide (KENALOG-10) injection 20 mg  20 mg Intra-Lesional Once Nita Brown MD        triamcinolone acetonide (KENALOG-10) injection 24 mg  24 mg Intra-Lesional Once Nita Brown MD        triamcinolone acetonide (KENALOG-10) injection 30 mg  30 mg Intra-Lesional Once         triamcinolone acetonide (KENALOG-10) injection 30 mg  3 mL Intra-Lesional Once Dilia Marin MD        triamcinolone acetonide (KENALOG-10) injection 30 mg  3 mL Intra-Lesional Once Dilia Marin MD        triamcinolone acetonide (KENALOG-10) injection 30 mg  30 mg Intra-Lesional Once Dilia Marin MD        triamcinolone acetonide (KENALOG-10) injection 30 mg  30 mg Intra-Lesional Once Nita Brown MD          Past Medical History:   Patient Active Problem List   Diagnosis    Alopecia areata    Dermatitis    Hypertrichosis    Urticaria    Autoimmune disease    Dermatitis, seborrheic     Past Medical History:   Diagnosis Date    Asthma     Hypothyroidism     Lobular breast cancer (H)     s/p  chemotherapy and radiation, in remission    Multiple allergies         CC Diila Marin MD  [address]  [id number]   [place] [address] on close of this encounter.    Clinic Administered Medication Documentation    Patient was given kenalog 10 mg/ml. Prior to medication administration, verified patient's identity using patient s name and date of birth. Please see MAR and medication order for additional information. Patient instructed to remain in clinic for 15 minutes and report any adverse reaction to staff immediately.    Vial/Syringe: Multi dose vial

## 2025-02-25 ENCOUNTER — OFFICE VISIT (OUTPATIENT)
Dept: DERMATOLOGY | Facility: CLINIC | Age: 70
End: 2025-02-25
Payer: COMMERCIAL

## 2025-02-25 DIAGNOSIS — L63.9 ALOPECIA AREATA: Primary | ICD-10-CM

## 2025-02-25 PROCEDURE — 11901 INJECT SKIN LESIONS >7: CPT | Performed by: DERMATOLOGY

## 2025-02-25 RX ORDER — EPINEPHRINE 0.3 MG/.3ML
INJECTION SUBCUTANEOUS
COMMUNITY
Start: 2024-07-16

## 2025-02-25 NOTE — LETTER
2/25/2025      Maria C Goodman  [address] [place] [address]      Dear Colleague,    Thank you for referring your patient, Maria C Goodman, to the [facility]. Please see a copy of my visit note below.    [facility] Dermatology Note  Encounter Date: Feb 25, 2025  Office Visit     Dermatology Problem List:  1. Alopecia areata  - Current tx:  started PO minoxidil 6/4/24, ILK, Clobex shampoo (1-2x a week), H&S shampoo, fluocinolone oil (less since June 2x a month), fluocinonide solution, Rogaine foam (5x a week), Allegra 180 mg morning, cetirizine at night  - Prior: clindamycin lotn PRN  - s/p ILK most recently on 4/12/21, 1/12/21, 10/22/2021, 1/31/22, 6/13/2022, 12/5/2022, 4/11/23, 5/10/2023,  and then every approximate 6 weeks with Dr. Marin,  - HairMetrix 9/15/20, 4/12/21, 1/31/22, 6/13/22, 12/20/23  2. Psoriasis, managed by [facility] dermatologist, Dr. Joshi  - Clobetasol ointment   3. Joint pain/pos JESSICA, anti-centromere  - follows with outside rheum   ____________________________________________     Assessment & Plan:     # Alopecia areata- chronic, active, seems to be improving nicely on PO minoxidil  - Continue PO minoxidil 1.25 mg daily  - ILK injections performed 02/25/2025.   - continue topicals - has clobetasol shampoo; clobetasol cream/ointment; lidex solution; rogaine    Procedures Performed:   Kenalog intralesional injection procedure note: After verbal consent and discussion of risks including but not limited to atrophy, pain, and bruising, time out was performed, the patient underwent positioning and the area was prepped with isopropyl alcohol, 2 total cc of Kenalog 10 mg/cc was injected into 20 sites on the scalp.  The patient tolerated the procedure well and left the Dermatology clinic in good condition.       Follow-up: Appointment scheduled for 04/08/2025    Staff and Scribe:     Kassandra Disclosure:   Jinny EWING, am serving as a  scribe; to document services personally performed by Dilia Marin MD -based on data collection and the provider's statements to me.     Provider Disclosure:   The documentation recorded by the scribe accurately reflects the services I personally performed and the decisions made by me.    Dilia Marin MD    Department of Dermatology  [facility]: Phone: [phone], Fax: [phone]  3/3/2025         ____________________________________________    CC: Hair/Scalp Problem (ILK injections)    HPI:  Ms. Maria C Goodman is a(n) 69 year old female who presents today as a return patient for hair loss.     The patient reports that she is doing well.   She has been spending lots of time birding this winter.   She feels like her hair is feeling baird. She thinks this is bc of minoxidil.   She met with Dr. Nix. Their plan is continue with minoxidil and ILK for 1 year.  She shares her scalp has been feeling a bit itchy.      Patient is otherwise feeling well, without additional skin concerns.    Labs Reviewed:  N/A    Physical exam:  Vitals: There were no vitals taken for this visit.  GEN: This is a well developed, well-nourished female in no acute distress, in a pleasant mood.    SKIN: Focused examination of the below was performed.  - decreased density midline scalp, vertex and 1 slightly more discreet patch on L temporal scalp. Overall improved compared to prior.   - No other lesions of concern on areas examined.     Medications:  Current Outpatient Medications   Medication Sig Dispense Refill     ALBUTEROL IN Inhale into the lungs daily as needed       azelastine (ASTELIN) 0.1 % nasal spray USE 1 TO 2 SPRAYS IN EACH NOSTRIL 1 TO 2 TIMES DAILY AS NEEDED       Calcium Carbonate-Vitamin D (CALCIUM + D PO) Take by mouth daily       cetirizine (ZYRTEC) 10 MG tablet Take 10 mg by mouth daily       Cholecalciferol (VITAMIN  D) 1000 UNITS capsule Take 5,000 Units by mouth daily       clobetasol (TEMOVATE) 0.05 % external ointment Apply topically 2 times daily To scalp  as needed 60 g 3     clobetasol (TEMOVATE) 0.05 % external ointment Use twice daily as needed for psoriasis 60 g 5     clobetasol propionate (CLOBEX) 0.05 % external shampoo APPLY TOPICALLY TO A DRY SCALP, LEAVE ON FOR 10-15 MINUTES, THEN LATHER AND RINSE. DO THIS EVERY OTHER  mL 11     clobetasol propionate (TEMOVATE) 0.05 % external cream Apply topically 2 times daily To scalp as needed 60 g 3     EPINEPHrine (ANY BX GENERIC EQUIV) 0.3 MG/0.3ML injection 2-pack Inject into the lateral thigh if needed for life threatening allergic reactions.       escitalopram (LEXAPRO) 10 MG tablet Take 15 mg by mouth       famotidine (PEPCID) 10 MG tablet TK 1 T PO QD  1     ferrous gluconate (FERGON) 324 (38 FE) MG tablet One tablet daily 60 tablet 1     Fluocinolone Acetonide Scalp 0.01 % OIL oil Apply topically once a week Use once a week. Apply 1-2 capfuls to dry scalp, massage in and leave on overnight, wash out in the morning 118.28 mL 5     fluocinonide (LIDEX) 0.05 % external solution Apply topically 2 times daily as needed (scalp itch) 60 mL 5     ketoconazole (NIZORAL) 2 % shampoo Apply to scalp, lather, then rinse, do every other day alternating with Head & Shoulders 120 mL 5     levothyroxine (SYNTHROID, LEVOTHROID) 112 MCG tablet Take by mouth daily       minoxidil (LONITEN) 2.5 MG tablet Take 1/2 tab daily. 45 tablet 3     montelukast (SINGULAIR) 10 MG tablet Take 10 mg by mouth At Bedtime       rosuvastatin (CRESTOR) 10 MG tablet Take 10 mg by mouth daily.       tacrolimus (PROTOPIC) 0.1 % ointment Apply topically 2 times daily 60 g 1     tretinoin (RETIN-A) 0.025 % external cream Apply a pea size amount to face daily as tolerated - ok for night time application 45 g 1     triamcinolone acetonide (KENALOG) 10 MG/ML injection See med note 5 mL 0     VITAMIN D PO         omalizumab (XOLAIR) 150 MG injection Inject 300 mg Subcutaneous       Current Facility-Administered Medications   Medication Dose Route Frequency Provider Last Rate Last Admin     betamethasone acet & sod phos (CELESTONE) injection 6 mg  6 mg Intramuscular Once Torres Sanchez MD         NONFORMULARY   Does not apply BID Nita Brown MD         triamcinolone acetonide (KENALOG-10) injection 10 mg  10 mg Intra-Lesional Once Nita Brown MD         triamcinolone acetonide (KENALOG-10) injection 10 mg  10 mg Intra-Lesional Once Nita Brown MD         triamcinolone acetonide (KENALOG-10) injection 10 mg  10 mg Other Once Torres Sanchez MD         triamcinolone acetonide (KENALOG-10) injection 10 mg  10 mg Intra-Lesional Once Nita Brown MD         triamcinolone acetonide (KENALOG-10) injection 20 mg  20 mg Intra-Lesional Once Dilia Marin MD         triamcinolone acetonide (KENALOG-10) injection 20 mg  20 mg Intra-Lesional Once Dilia Marin MD         triamcinolone acetonide (KENALOG-10) injection 20 mg  20 mg Intra-Lesional Once Nita Brown MD         triamcinolone acetonide (KENALOG-10) injection 20 mg  20 mg Intra-Lesional Once Nita Brown MD         triamcinolone acetonide (KENALOG-10) injection 24 mg  24 mg Intra-Lesional Once Nita Brown MD         triamcinolone acetonide (KENALOG-10) injection 30 mg  30 mg Intra-Lesional Once          triamcinolone acetonide (KENALOG-10) injection 30 mg  3 mL Intra-Lesional Once Dilia Marin MD         triamcinolone acetonide (KENALOG-10) injection 30 mg  3 mL Intra-Lesional Once Dilia Marin MD         triamcinolone acetonide (KENALOG-10) injection 30 mg  30 mg Intra-Lesional Once Dilia Marin MD         triamcinolone acetonide (KENALOG-10) injection 30 mg  30 mg Intra-Lesional Once Nita Brown MD           Past Medical History:   Patient Active Problem List   Diagnosis     Alopecia areata     Dermatitis     Hypertrichosis     Urticaria     Autoimmune disease     Dermatitis, seborrheic     Past Medical History:   Diagnosis Date     Asthma      Hypothyroidism      Lobular breast cancer (H)     s/p chemotherapy and radiation, in remission     Multiple allergies         CC Dilia Marin MD  [address] [facility] [id number]   [place] [address] on close of this encounter.    Clinic Administered Medication Documentation    Patient was given kenalog 10 mg/ml. Prior to medication administration, verified patient's identity using patient s name and date of birth. Please see MAR and medication order for additional information. Patient instructed to remain in clinic for 15 minutes and report any adverse reaction to staff immediately.    Vial/Syringe: Multi dose vial        Again, thank you for allowing me to participate in the care of your patient.        Sincerely,        Dilia Marin MD    Electronically signed

## 2025-02-25 NOTE — PATIENT INSTRUCTIONS
Proper skin care from [facility]:    -Eliminate harsh soaps as they strip the natural oils from the skin, often resulting in dry itchy skin ( i.e. Dial, Zest, [place] Spring)  -Use mild soaps such as Cetaphil or Dove Sensitive Skin in the shower. You do not need to use soap on arms, legs, and trunk every time you shower unless visibly soiled.   -Avoid hot or cold showers.  -After showering, lightly dry off and apply moisturizing within 2-3 minutes. This will help trap moisture in the skin.   -Aggressive use of a moisturizer at least 1-2 times a day to the entire body (including -Vanicream, Cetaphil, Aquaphor or Cerave) and moisturize hands after every washing.  -We recommend using moisturizers that come in a tub that needs to be scooped out, not a pump. This has more of an oil base. It will hold moisture in your skin much better than a water base moisturizer. The above recommended are non-pore clogging.      Wear a sunscreen with at least SPF 30 on your face, ears, neck and V of the chest daily. Wear sunscreen on other areas of the body if those areas are exposed to the sun throughout the day. Sunscreens can contain physical and/or chemical blockers. Physical blockers are less likely to clog pores, these include zinc oxide and titanium dioxide. Reapply every two hour and after swimming.     Sunscreen examples: https://www.ewg.org/sunscreen/    UV radiation  UVA radiation remains constant throughout the day and throughout the year. It is a longer wavelength than UVB and therefore penetrates deeper into the skin leading to immediate and delayed tanning, photoaging, and skin cancer. 70-80% of UVA and UVB radiation occurs between the hours of 10am-2pm.  UVB radiation  UVB radiation causes the most harmful effects and is more significant during the summer months. However, snow and ice can reflect UVB radiation leading to skin damage during the winter months as well. UVB radiation is responsible for tanning,  burning, inflammation, delayed erythema (pinkness), pigmentation (brown spots), and skin cancer.     I recommend self monthly full body exams and yearly full body exams with a dermatology provider. If you develop a new or changing lesion please follow up for examination. Most skin cancers are pink and scaly or pink and pearly. However, we do see blue/brown/black skin cancers.  Consider the ABCDEs of melanoma when giving yourself your monthly full body exam ( don't forget the groin, buttocks, feet, toes, etc). A-asymmetry, B-borders, C-color, D-diameter, E-elevation or evolving. If you see any of these changes please follow up in clinic. If you cannot see your back I recommend purchasing a hand held mirror to use with a larger wall mirror.       Checking for Skin Cancer  You can find cancer early by checking your skin each month. There are 3 kinds of skin cancer. They are melanoma, basal cell carcinoma, and squamous cell carcinoma. Doing monthly skin checks is the best way to find new marks or skin changes. Follow the instructions below for checking your skin.   The ABCDEs of checking moles for melanoma   Check your moles or growths for signs of melanoma using ABCDE:   Asymmetry: the sides of the mole or growth don t match  Border: the edges are ragged, notched, or blurred  Color: the color within the mole or growth varies  Diameter: the mole or growth is larger than 6 mm (size of a pencil eraser)  Evolving: the size, shape, or color of the mole or growth is changing (evolving is not shown in the images below)    Checking for other types of skin cancer  Basal cell carcinoma or squamous cell carcinoma have symptoms such as:     A spot or mole that looks different from all other marks on your skin  Changes in how an area feels, such as itching, tenderness, or pain  Changes in the skin's surface, such as oozing, bleeding, or scaliness  A sore that does not heal  New swelling or redness beyond the border of a  mole    Who s at risk?  Anyone can get skin cancer. But you are at greater risk if you have:   Fair skin, light-colored hair, or light-colored eyes  Many moles or abnormal moles on your skin  A history of sunburns from sunlight or tanning beds  A family history of skin cancer  A history of exposure to radiation or chemicals  A weakened immune system  If you have had skin cancer in the past, you are at risk for recurring skin cancer.   How to check your skin  Do your monthly skin checkups in front of a full-length mirror. Check all parts of your body, including your:   Head (ears, face, neck, and scalp)  Torso (front, back, and sides)  Arms (tops, undersides, upper, and lower armpits)  Hands (palms, backs, and fingers, including under the nails)  Buttocks and genitals  Legs (front, back, and sides)  Feet (tops, soles, toes, including under the nails, and between toes)  If you have a lot of moles, take digital photos of them each month. Make sure to take photos both up close and from a distance. These can help you see if any moles change over time.   Most skin changes are not cancer. But if you see any changes in your skin, call your doctor right away. Only he or she can diagnose a problem. If you have skin cancer, seeing your doctor can be the first step toward getting the treatment that could save your life.   [employer] last reviewed this educational content on 4/1/2019 2000-2020 [employer]. [address] [place] [address], [place] [address]. All rights reserved. This information is not intended as a substitute for professional medical care. Always follow your healthcare professional's instructions.       When should I call my doctor?  If you are worsening or not improving, please, contact us or seek urgent care as noted below.     Who should I call with questions (adults)?    [facility] [phone]  For urgent needs outside of business hours call the [facility] at  [phone] and ask for the dermatology resident on call to be paged  If this is a medical emergency and you are unable to reach an ER, Call 911      If you need a prescription refill, please contact your pharmacy. Refills are approved or denied by our Physicians during normal business hours, Monday through Friday.  Per office policy, refills will not be granted if you have not been seen within the past year (or sooner depending on the condition).

## 2025-03-08 ENCOUNTER — HEALTH MAINTENANCE LETTER (OUTPATIENT)
Age: 70
End: 2025-03-08

## 2025-03-30 ENCOUNTER — HEALTH MAINTENANCE LETTER (OUTPATIENT)
Age: 70
End: 2025-03-30

## 2025-04-08 ENCOUNTER — OFFICE VISIT (OUTPATIENT)
Dept: DERMATOLOGY | Facility: CLINIC | Age: 70
End: 2025-04-08
Payer: COMMERCIAL

## 2025-04-08 DIAGNOSIS — L63.9 ALOPECIA AREATA: Primary | ICD-10-CM

## 2025-04-08 PROCEDURE — 11901 INJECT SKIN LESIONS >7: CPT | Performed by: DERMATOLOGY

## 2025-04-08 NOTE — LETTER
4/8/2025      Maria C Goodman  [address] [place] [address]      Dear Colleague,    Thank you for referring your patient, Maria C Goodman, to the [facility]. Please see a copy of my visit note below.    [facility] Dermatology Note  Encounter Date: Apr 8, 2025  Office Visit     Dermatology Problem List:  1. Alopecia areata  - Current tx:  started PO minoxidil 6/4/24, ILK, Clobex shampoo (1-2x a week), H&S shampoo, fluocinolone oil (less since June 2x a month), fluocinonide solution, Rogaine foam (5x a week), Allegra 180 mg morning, cetirizine at night  - Prior: clindamycin lotn PRN  - s/p ILK most recently on 4/12/21, 1/12/21, 10/22/2021, 1/31/22, 6/13/2022, 12/5/2022, 4/11/23, 5/10/2023,  and then every approximate 6 weeks with Dr. Marin,  - HairMetrix 9/15/20, 4/12/21, 1/31/22, 6/13/22, 12/20/23  2. Psoriasis, managed by [facility] dermatologist, Dr. Joshi  - Clobetasol ointment   3. Joint pain/pos JESSICA, anti-centromere  - follows with outside rheum     # Shx: traveling to [place] in September. Loves birding.    ____________________________________________     Assessment & Plan:     # Alopecia areata- chronic, active, seems to be improving nicely on PO minoxidil  - Continue PO minoxidil 1.25 mg daily  - ILK injections today.   - continue topicals - has clobetasol shampoo; clobetasol cream/ointment; lidex solution; rogaine    Procedures Performed:   Kenalog intralesional injection procedure note: After verbal consent and discussion of risks including but not limited to atrophy, pain, and bruising, time out was performed, the patient underwent positioning and the area was prepped with isopropyl alcohol, 1 total cc of Kenalog 10 mg/cc was injected into 10 sites on the scalp.  The patient tolerated the procedure well and left the Dermatology clinic in good condition.       Follow-up: Appointment scheduled for 06/03/2025    Staff and Scribe:     Kassandra  Disclosure:   I, Jinny Armijo, am serving as a scribe; to document services personally performed by Dilia Marin MD -based on data collection and the provider's statements to me.     Provider Disclosure:   The documentation recorded by the scribe accurately reflects the services I personally performed and the decisions made by me.    Dilia Marin MD    Department of Dermatology  [facility]: Phone: [phone], Fax: [phone]  4/14/2025           ____________________________________________    CC: Hair Loss (Routine hairloss follow up  ILK inj)    HPI:  Ms. Maria C Goodman is a(n) 69 year old female who presents today as a return patient for hair loss.     The patient reports that she feels some progress with her HL. Still on PO minoxidil 1.25 mg. No issues.    Patient is otherwise feeling well, without additional skin concerns.    Labs Reviewed:  N/A    Physical exam:  Vitals: There were no vitals taken for this visit.  GEN: This is a well developed, well-nourished female in no acute distress, in a pleasant mood.    SKIN: Focused examination of the below was performed.  - subtle alopecic patch, frontal hairline, vertex scalp L occipital scalp, overall improved from prior   - No other lesions of concern on areas examined.     Medications:  Current Outpatient Medications   Medication Sig Dispense Refill     ALBUTEROL IN Inhale into the lungs daily as needed       azelastine (ASTELIN) 0.1 % nasal spray USE 1 TO 2 SPRAYS IN EACH NOSTRIL 1 TO 2 TIMES DAILY AS NEEDED       Calcium Carbonate-Vitamin D (CALCIUM + D PO) Take by mouth daily       cetirizine (ZYRTEC) 10 MG tablet Take 10 mg by mouth daily       Cholecalciferol (VITAMIN D) 1000 UNITS capsule Take 5,000 Units by mouth daily       clobetasol (TEMOVATE) 0.05 % external ointment Apply topically 2 times daily To scalp  as needed 60 g 3     clobetasol  (TEMOVATE) 0.05 % external ointment Use twice daily as needed for psoriasis 60 g 5     clobetasol propionate (CLOBEX) 0.05 % external shampoo APPLY TOPICALLY TO A DRY SCALP, LEAVE ON FOR 10-15 MINUTES, THEN LATHER AND RINSE. DO THIS EVERY OTHER  mL 11     clobetasol propionate (TEMOVATE) 0.05 % external cream Apply topically 2 times daily To scalp as needed 60 g 3     EPINEPHrine (ANY BX GENERIC EQUIV) 0.3 MG/0.3ML injection 2-pack Inject into the lateral thigh if needed for life threatening allergic reactions.       escitalopram (LEXAPRO) 10 MG tablet Take 15 mg by mouth       famotidine (PEPCID) 10 MG tablet TK 1 T PO QD  1     ferrous gluconate (FERGON) 324 (38 FE) MG tablet One tablet daily 60 tablet 1     Fluocinolone Acetonide Scalp 0.01 % OIL oil Apply topically once a week Use once a week. Apply 1-2 capfuls to dry scalp, massage in and leave on overnight, wash out in the morning 118.28 mL 5     fluocinonide (LIDEX) 0.05 % external solution Apply topically 2 times daily as needed (scalp itch) 60 mL 5     ketoconazole (NIZORAL) 2 % shampoo Apply to scalp, lather, then rinse, do every other day alternating with Head & Shoulders 120 mL 5     levothyroxine (SYNTHROID, LEVOTHROID) 112 MCG tablet Take by mouth daily       minoxidil (LONITEN) 2.5 MG tablet Take 1/2 tab daily. 45 tablet 3     montelukast (SINGULAIR) 10 MG tablet Take 10 mg by mouth At Bedtime       rosuvastatin (CRESTOR) 10 MG tablet Take 10 mg by mouth daily.       tacrolimus (PROTOPIC) 0.1 % ointment Apply topically 2 times daily 60 g 1     tretinoin (RETIN-A) 0.025 % external cream Apply a pea size amount to face daily as tolerated - ok for night time application 45 g 1     triamcinolone acetonide (KENALOG) 10 MG/ML injection See med note 5 mL 0     VITAMIN D PO        omalizumab (XOLAIR) 150 MG injection Inject 300 mg Subcutaneous       Current Facility-Administered Medications   Medication Dose Route Frequency Provider Last Rate Last  Admin     betamethasone acet & sod phos (CELESTONE) injection 6 mg  6 mg Intramuscular Once Torres Sanchez MD         NONFORMULARY   Does not apply BID Nita Brown MD         triamcinolone acetonide (KENALOG-10) injection 10 mg  10 mg Intra-Lesional Once Nita Brown MD         triamcinolone acetonide (KENALOG-10) injection 10 mg  10 mg Intra-Lesional Once Nita Brown MD         triamcinolone acetonide (KENALOG-10) injection 10 mg  10 mg Other Once Torres Sanchez MD         triamcinolone acetonide (KENALOG-10) injection 10 mg  10 mg Intra-Lesional Once Nita Brown MD         triamcinolone acetonide (KENALOG-10) injection 20 mg  20 mg Intra-Lesional Once Dilia Marin MD         triamcinolone acetonide (KENALOG-10) injection 20 mg  20 mg Intra-Lesional Once Dilia Marin MD         triamcinolone acetonide (KENALOG-10) injection 20 mg  20 mg Intra-Lesional Once Nita Brown MD         triamcinolone acetonide (KENALOG-10) injection 20 mg  20 mg Intra-Lesional Once Nita Brown MD         triamcinolone acetonide (KENALOG-10) injection 24 mg  24 mg Intra-Lesional Once Nita Brown MD         triamcinolone acetonide (KENALOG-10) injection 30 mg  30 mg Intra-Lesional Once          triamcinolone acetonide (KENALOG-10) injection 30 mg  3 mL Intra-Lesional Once Dilia Marin MD         triamcinolone acetonide (KENALOG-10) injection 30 mg  3 mL Intra-Lesional Once Dilia Marin MD         triamcinolone acetonide (KENALOG-10) injection 30 mg  30 mg Intra-Lesional Once Dilia Marin MD         triamcinolone acetonide (KENALOG-10) injection 30 mg  30 mg Intra-Lesional Once Nita Brown MD          Past Medical History:   Patient Active Problem List   Diagnosis     Alopecia areata     Dermatitis     Hypertrichosis     Urticaria     Autoimmune disease     Dermatitis,  seborrheic     Past Medical History:   Diagnosis Date     Asthma      Hypothyroidism      Lobular breast cancer (H)     s/p chemotherapy and radiation, in remission     Multiple allergies         CC Dilia Marin MD  [address] [facility] [place] [address] on close of this encounter.    Clinic Administered Medication Documentation    Patient was given kenalog 10 mg/ml. Prior to medication administration, verified patient's identity using patient s name and date of birth. Please see MAR and medication order for additional information. Patient instructed to remain in clinic for 15 minutes and report any adverse reaction to staff immediately.    Vial/Syringe: Multi dose vial        Clinic Administered Medication Documentation    Patient was given kenalog 10 mg/ml. Prior to medication administration, verified patient's identity using patient s name and date of birth. Please see MAR and medication order for additional information. Patient instructed to remain in clinic for 15 minutes and report any adverse reaction to staff immediately.    Vial/Syringe: Multi dose vial    Amada RICO RN  [facility] [place]  [phone]        Again, thank you for allowing me to participate in the care of your patient.        Sincerely,        Dilia Marin MD    Electronically signed

## 2025-04-08 NOTE — PROGRESS NOTES
[facility] Dermatology Note  Encounter Date: Apr 8, 2025  Office Visit     Dermatology Problem List:  1. Alopecia areata  - Current tx:  started PO minoxidil 6/4/24, ILK, Clobex shampoo (1-2x a week), H&S shampoo, fluocinolone oil (less since June 2x a month), fluocinonide solution, Rogaine foam (5x a week), Allegra 180 mg morning, cetirizine at night  - Prior: clindamycin lotn PRN  - s/p ILK most recently on 4/12/21, 1/12/21, 10/22/2021, 1/31/22, 6/13/2022, 12/5/2022, 4/11/23, 5/10/2023,  and then every approximate 6 weeks with Dr. Marin,  - HairMetrix 9/15/20, 4/12/21, 1/31/22, 6/13/22, 12/20/23  2. Psoriasis, managed by [facility], Dr. Joshi  - Clobetasol ointment   3. Joint pain/pos JESSICA, anti-centromere  - follows with outside rheum     # Shx: traveling to [place] in September. Loves maria luisa.    ____________________________________________     Assessment & Plan:     # Alopecia areata- chronic, active, seems to be improving nicely on PO minoxidil  - Continue PO minoxidil 1.25 mg daily  - ILK injections today.   - continue topicals - has clobetasol shampoo; clobetasol cream/ointment; lidex solution; rogaine    Procedures Performed:   Kenalog intralesional injection procedure note: After verbal consent and discussion of risks including but not limited to atrophy, pain, and bruising, time out was performed, the patient underwent positioning and the area was prepped with isopropyl alcohol, 1 total cc of Kenalog 10 mg/cc was injected into 10 sites on the scalp.  The patient tolerated the procedure well and left the Dermatology clinic in good condition.       Follow-up: Appointment scheduled for 06/03/2025    Staff and Scribe:     Scribe Disclosure:   Jinny EWING, am serving as a scribe; to document services personally performed by Dilia Marin MD -based on data collection and the provider's statements to me.     Provider Disclosure:   The documentation recorded by the scribe  accurately reflects the services I personally performed and the decisions made by me.    Dilia Marin MD    Department of Dermatology  [facility]: Phone: [phone], Fax: [phone]  4/14/2025           ____________________________________________    CC: Hair Loss (Routine hairloss follow up  ILK inj)    HPI:  Ms. Maria C Goodman is a(n) 69 year old female who presents today as a return patient for hair loss.     The patient reports that she feels some progress with her HL. Still on PO minoxidil 1.25 mg. No issues.    Patient is otherwise feeling well, without additional skin concerns.    Labs Reviewed:  N/A    Physical exam:  Vitals: There were no vitals taken for this visit.  GEN: This is a well developed, well-nourished female in no acute distress, in a pleasant mood.    SKIN: Focused examination of the below was performed.  - subtle alopecic patch, frontal hairline, vertex scalp L occipital scalp, overall improved from prior   - No other lesions of concern on areas examined.     Medications:  Current Outpatient Medications   Medication Sig Dispense Refill    ALBUTEROL IN Inhale into the lungs daily as needed      azelastine (ASTELIN) 0.1 % nasal spray USE 1 TO 2 SPRAYS IN EACH NOSTRIL 1 TO 2 TIMES DAILY AS NEEDED      Calcium Carbonate-Vitamin D (CALCIUM + D PO) Take by mouth daily      cetirizine (ZYRTEC) 10 MG tablet Take 10 mg by mouth daily      Cholecalciferol (VITAMIN D) 1000 UNITS capsule Take 5,000 Units by mouth daily      clobetasol (TEMOVATE) 0.05 % external ointment Apply topically 2 times daily To scalp  as needed 60 g 3    clobetasol (TEMOVATE) 0.05 % external ointment Use twice daily as needed for psoriasis 60 g 5    clobetasol propionate (CLOBEX) 0.05 % external shampoo APPLY TOPICALLY TO A DRY SCALP, LEAVE ON FOR 10-15 MINUTES, THEN LATHER AND RINSE. DO THIS EVERY OTHER  mL 11    clobetasol  propionate (TEMOVATE) 0.05 % external cream Apply topically 2 times daily To scalp as needed 60 g 3    EPINEPHrine (ANY BX GENERIC EQUIV) 0.3 MG/0.3ML injection 2-pack Inject into the lateral thigh if needed for life threatening allergic reactions.      escitalopram (LEXAPRO) 10 MG tablet Take 15 mg by mouth      famotidine (PEPCID) 10 MG tablet TK 1 T PO QD  1    ferrous gluconate (FERGON) 324 (38 FE) MG tablet One tablet daily 60 tablet 1    Fluocinolone Acetonide Scalp 0.01 % OIL oil Apply topically once a week Use once a week. Apply 1-2 capfuls to dry scalp, massage in and leave on overnight, wash out in the morning 118.28 mL 5    fluocinonide (LIDEX) 0.05 % external solution Apply topically 2 times daily as needed (scalp itch) 60 mL 5    ketoconazole (NIZORAL) 2 % shampoo Apply to scalp, lather, then rinse, do every other day alternating with Head & Shoulders 120 mL 5    levothyroxine (SYNTHROID, LEVOTHROID) 112 MCG tablet Take by mouth daily      minoxidil (LONITEN) 2.5 MG tablet Take 1/2 tab daily. 45 tablet 3    montelukast (SINGULAIR) 10 MG tablet Take 10 mg by mouth At Bedtime      rosuvastatin (CRESTOR) 10 MG tablet Take 10 mg by mouth daily.      tacrolimus (PROTOPIC) 0.1 % ointment Apply topically 2 times daily 60 g 1    tretinoin (RETIN-A) 0.025 % external cream Apply a pea size amount to face daily as tolerated - ok for night time application 45 g 1    triamcinolone acetonide (KENALOG) 10 MG/ML injection See med note 5 mL 0    VITAMIN D PO       omalizumab (XOLAIR) 150 MG injection Inject 300 mg Subcutaneous       Current Facility-Administered Medications   Medication Dose Route Frequency Provider Last Rate Last Admin    betamethasone acet & sod phos (CELESTONE) injection 6 mg  6 mg Intramuscular Once Torres Sanchez MD        NONFORMULARY   Does not apply BID Nita Brown MD        triamcinolone acetonide (KENALOG-10) injection 10 mg  10 mg Intra-Lesional Once Nita Brown  MD Miguel        triamcinolone acetonide (KENALOG-10) injection 10 mg  10 mg Intra-Lesional Once Nita Brown MD        triamcinolone acetonide (KENALOG-10) injection 10 mg  10 mg Other Once Trores Sanchez MD        triamcinolone acetonide (KENALOG-10) injection 10 mg  10 mg Intra-Lesional Once Nita Brown MD        triamcinolone acetonide (KENALOG-10) injection 20 mg  20 mg Intra-Lesional Once Dilia Marin MD        triamcinolone acetonide (KENALOG-10) injection 20 mg  20 mg Intra-Lesional Once Dilia Marin MD        triamcinolone acetonide (KENALOG-10) injection 20 mg  20 mg Intra-Lesional Once Nita Brown MD        triamcinolone acetonide (KENALOG-10) injection 20 mg  20 mg Intra-Lesional Once Nita Brown MD        triamcinolone acetonide (KENALOG-10) injection 24 mg  24 mg Intra-Lesional Once Nita Brown MD        triamcinolone acetonide (KENALOG-10) injection 30 mg  30 mg Intra-Lesional Once         triamcinolone acetonide (KENALOG-10) injection 30 mg  3 mL Intra-Lesional Once Dilia Marin MD        triamcinolone acetonide (KENALOG-10) injection 30 mg  3 mL Intra-Lesional Once Dilia Marin MD        triamcinolone acetonide (KENALOG-10) injection 30 mg  30 mg Intra-Lesional Once Dilia Marin MD        triamcinolone acetonide (KENALOG-10) injection 30 mg  30 mg Intra-Lesional Once Nita Brown MD          Past Medical History:   Patient Active Problem List   Diagnosis    Alopecia areata    Dermatitis    Hypertrichosis    Urticaria    Autoimmune disease    Dermatitis, seborrheic     Past Medical History:   Diagnosis Date    Asthma     Hypothyroidism     Lobular breast cancer (H)     s/p chemotherapy and radiation, in remission    Multiple allergies         IVY Marin MD  [address] [facility] [id number]   [place] [address] on close of this  encounter.    Clinic Administered Medication Documentation    Patient was given kenalog 10 mg/ml. Prior to medication administration, verified patient's identity using patient s name and date of birth. Please see MAR and medication order for additional information. Patient instructed to remain in clinic for 15 minutes and report any adverse reaction to staff immediately.    Vial/Syringe: Multi dose vial

## 2025-04-08 NOTE — PATIENT INSTRUCTIONS
Proper skin care from [facility]:    -Eliminate harsh soaps as they strip the natural oils from the skin, often resulting in dry itchy skin ( i.e. Dial, Zest, [place] Spring)  -Use mild soaps such as Cetaphil or Dove Sensitive Skin in the shower. You do not need to use soap on arms, legs, and trunk every time you shower unless visibly soiled.   -Avoid hot or cold showers.  -After showering, lightly dry off and apply moisturizing within 2-3 minutes. This will help trap moisture in the skin.   -Aggressive use of a moisturizer at least 1-2 times a day to the entire body (including -Vanicream, Cetaphil, Aquaphor or Cerave) and moisturize hands after every washing.  -We recommend using moisturizers that come in a tub that needs to be scooped out, not a pump. This has more of an oil base. It will hold moisture in your skin much better than a water base moisturizer. The above recommended are non-pore clogging.      Wear a sunscreen with at least SPF 30 on your face, ears, neck and V of the chest daily. Wear sunscreen on other areas of the body if those areas are exposed to the sun throughout the day. Sunscreens can contain physical and/or chemical blockers. Physical blockers are less likely to clog pores, these include zinc oxide and titanium dioxide. Reapply every two hour and after swimming.     Sunscreen examples: https://www.ewg.org/sunscreen/    UV radiation  UVA radiation remains constant throughout the day and throughout the year. It is a longer wavelength than UVB and therefore penetrates deeper into the skin leading to immediate and delayed tanning, photoaging, and skin cancer. 70-80% of UVA and UVB radiation occurs between the hours of 10am-2pm.  UVB radiation  UVB radiation causes the most harmful effects and is more significant during the summer months. However, snow and ice can reflect UVB radiation leading to skin damage during the winter months as well. UVB radiation is responsible for tanning,  burning, inflammation, delayed erythema (pinkness), pigmentation (brown spots), and skin cancer.     I recommend self monthly full body exams and yearly full body exams with a dermatology provider. If you develop a new or changing lesion please follow up for examination. Most skin cancers are pink and scaly or pink and pearly. However, we do see blue/brown/black skin cancers.  Consider the ABCDEs of melanoma when giving yourself your monthly full body exam ( don't forget the groin, buttocks, feet, toes, etc). A-asymmetry, B-borders, C-color, D-diameter, E-elevation or evolving. If you see any of these changes please follow up in clinic. If you cannot see your back I recommend purchasing a hand held mirror to use with a larger wall mirror.       Checking for Skin Cancer  You can find cancer early by checking your skin each month. There are 3 kinds of skin cancer. They are melanoma, basal cell carcinoma, and squamous cell carcinoma. Doing monthly skin checks is the best way to find new marks or skin changes. Follow the instructions below for checking your skin.   The ABCDEs of checking moles for melanoma   Check your moles or growths for signs of melanoma using ABCDE:   Asymmetry: the sides of the mole or growth don t match  Border: the edges are ragged, notched, or blurred  Color: the color within the mole or growth varies  Diameter: the mole or growth is larger than 6 mm (size of a pencil eraser)  Evolving: the size, shape, or color of the mole or growth is changing (evolving is not shown in the images below)    Checking for other types of skin cancer  Basal cell carcinoma or squamous cell carcinoma have symptoms such as:     A spot or mole that looks different from all other marks on your skin  Changes in how an area feels, such as itching, tenderness, or pain  Changes in the skin's surface, such as oozing, bleeding, or scaliness  A sore that does not heal  New swelling or redness beyond the border of a  mole    Who s at risk?  Anyone can get skin cancer. But you are at greater risk if you have:   Fair skin, light-colored hair, or light-colored eyes  Many moles or abnormal moles on your skin  A history of sunburns from sunlight or tanning beds  A family history of skin cancer  A history of exposure to radiation or chemicals  A weakened immune system  If you have had skin cancer in the past, you are at risk for recurring skin cancer.   How to check your skin  Do your monthly skin checkups in front of a full-length mirror. Check all parts of your body, including your:   Head (ears, face, neck, and scalp)  Torso (front, back, and sides)  Arms (tops, undersides, upper, and lower armpits)  Hands (palms, backs, and fingers, including under the nails)  Buttocks and genitals  Legs (front, back, and sides)  Feet (tops, soles, toes, including under the nails, and between toes)  If you have a lot of moles, take digital photos of them each month. Make sure to take photos both up close and from a distance. These can help you see if any moles change over time.   Most skin changes are not cancer. But if you see any changes in your skin, call your doctor right away. Only he or she can diagnose a problem. If you have skin cancer, seeing your doctor can be the first step toward getting the treatment that could save your life.   [employer] last reviewed this educational content on 4/1/2019 2000-2020 [employer]. [address] [place] [address], [place] [address]. All rights reserved. This information is not intended as a substitute for professional medical care. Always follow your healthcare professional's instructions.       When should I call my doctor?  If you are worsening or not improving, please, contact us or seek urgent care as noted below.     Who should I call with questions (adults)?    [facility] [phone]  For urgent needs outside of business hours call the [facility] at  [phone] and ask for the dermatology resident on call to be paged  If this is a medical emergency and you are unable to reach an ER, Call 911      If you need a prescription refill, please contact your pharmacy. Refills are approved or denied by our Physicians during normal business hours, Monday through Friday.  Per office policy, refills will not be granted if you have not been seen within the past year (or sooner depending on the condition).

## 2025-04-08 NOTE — PROGRESS NOTES
Clinic Administered Medication Documentation    Patient was given kenalog 10 mg/ml. Prior to medication administration, verified patient's identity using patient s name and date of birth. Please see MAR and medication order for additional information. Patient instructed to remain in clinic for 15 minutes and report any adverse reaction to staff immediately.    Vial/Syringe: Multi dose vial    Amada RICO RN  [facility] [place]  [phone]

## 2025-04-29 DIAGNOSIS — L63.9 ALOPECIA AREATA: ICD-10-CM

## 2025-04-29 RX ORDER — MINOXIDIL 2.5 MG/1
TABLET ORAL
Qty: 45 TABLET | Refills: 3 | Status: SHIPPED | OUTPATIENT
Start: 2025-04-29

## 2025-04-29 NOTE — TELEPHONE ENCOUNTER
Minoxidil 2.5 mg  Last Written Prescription Date:  6/4/24  Last Fill Quantity: 45,  # refills: 3   Last office visit: 4/8/2025 ; last virtual visit: Visit date not found with prescribing provider:  Tomas   Future Office Visit: 8/5/25

## 2025-04-29 NOTE — TELEPHONE ENCOUNTER
Routing refill request to provider for review/approval because:  Drug not on the [facility] refill protocol     Amada RICO RN  [facility] [place]  [phone]

## 2025-06-03 ENCOUNTER — OFFICE VISIT (OUTPATIENT)
Dept: DERMATOLOGY | Facility: CLINIC | Age: 70
End: 2025-06-03
Payer: COMMERCIAL

## 2025-06-03 VITALS — DIASTOLIC BLOOD PRESSURE: 75 MMHG | OXYGEN SATURATION: 95 % | SYSTOLIC BLOOD PRESSURE: 118 MMHG | HEART RATE: 69 BPM

## 2025-06-03 DIAGNOSIS — M35.9 AUTOIMMUNE DISEASE: ICD-10-CM

## 2025-06-03 DIAGNOSIS — L21.9 DERMATITIS, SEBORRHEIC: ICD-10-CM

## 2025-06-03 DIAGNOSIS — L63.9 ALOPECIA AREATA: Primary | ICD-10-CM

## 2025-06-03 PROCEDURE — 1126F AMNT PAIN NOTED NONE PRSNT: CPT | Performed by: DERMATOLOGY

## 2025-06-03 PROCEDURE — 99214 OFFICE O/P EST MOD 30 MIN: CPT | Performed by: DERMATOLOGY

## 2025-06-03 PROCEDURE — 3074F SYST BP LT 130 MM HG: CPT | Performed by: DERMATOLOGY

## 2025-06-03 PROCEDURE — 3078F DIAST BP <80 MM HG: CPT | Performed by: DERMATOLOGY

## 2025-06-03 ASSESSMENT — PAIN SCALES - GENERAL: PAINLEVEL_OUTOF10: NO PAIN (0)

## 2025-06-03 NOTE — LETTER
6/3/2025      Maria C Goodman  [address] [place] [address]      Dear Colleague,    Thank you for referring your patient, Maria C Goodman, to the [facility]. Please see a copy of my visit note below.    [facility] Dermatology Note  Encounter Date: Clive 3, 2025  Office Visit     Dermatology Problem List:  1.  Alopecia areata  - Current tx:  ILK, Clobex shampoo (1-2x a week), oral minoxidil 1.25 mg daily  - Prior: clindamycin lotn PRN, , H&S shampoo, fluocinolone oil (less since June 2x a month), fluocinonide solution, Rogaine foam (5x a week), Allegra 180 mg morning, cetirizine at night  - s/p ILK most recently on 4/12/21, 1/12/21, 10/22/2021, 1/31/22, 6/13/2022, 12/5/2022, 4/11/23, 5/10/2023,  and then every approximate 6 weeks with Dr. Marin, 1/14/25; 2/25/25  - started PO minoxidil 6/4/24  - HairMetrix 9/15/20, 4/12/21, 1/31/22, 6/13/22, 12/20/23  2. Psoriasis, managed by [facility] dermatologist, Dr. Joshi  - Clobetasol ointment   3. Joint pain/pos JESSICA, anti-centromere  - follows with outside rheum   ____________________________________________    Assessment & Plan:     #  Alopecia areata- chronic, active, seems to be improving  some on po minoxidil  - Continue PO minoxidil 1.25 mg daily  - restart clobetasol shampoo once a week alternating with head & shoulders shampoo  - hold  ILK injections today  - discontinued clobetasol cream/ointment; lidex solution; rogaine      Procedures Performed:   None      Follow-up: 10 month(s) in-person, or earlier for new or changing lesions    Staff and Scribe:     Scribe Disclosure:   By signing my name below, I, Lela Burden, attest that this document has been prepared under the direction and in the presence of Nita Brown MD      Electronically signed by: Lela Burden 6/3/25    Provider Disclosure:   The documentation recorded by the scribe accurately reflects the services I personally performed and  the decisions made by me.    Nita Brown MD  Professor   Department of Dermatology  [facility]: Phone: [phone], Fax:[phone]  [facility]: Phone: [phone], Fax: [phone]       ____________________________________________    CC: Hair Loss (Alopecia areata - chronic is improving and noting hair growth. )    HPI:  Ms. Maria C Goodman is a 70 year old female who presents as a return patient for follow-up of hair loss, diagnosed as Alopecia areata .   - Last seen in-clinic on 2/25/25 with Concepcion Marin MD  - Shedding or thinning, or both: none  - Current tx: oral minoxidil 1.25 mg daily; head and shoulders 2-3 times a week alternating with other otc shampoo   no Any new medications, supplements, or products? (please list below)     no Scalp pain   no Scalp burning   no Scalp itching    no Eyebrow changes    no Eyelash changes   Around the lips Bui changes    no Other body hair changes    no Nail changes    no Additional symptoms? (please list below)     - Overall course: Patient has noticed some hair growth. She has felt that her alopecia is doing great. Has noticed some facial hair around the lips.   Patient is otherwise feeling well, in usual state of health, and has no additional skin concerns today.     Labs:  None reviewed.    Physical Exam:  Vitals: /75   Pulse 69   SpO2 95%   GEN: Well developed, well-nourished, in no acute distress, in a pleasant mood.    SKIN: Focused examination of face and scalp was performed.  - no diffuse erythema   - mild perifollicular erythema  - mild perifollicular scale   - no scaling of the scalp   - negative hair pull test   - normal eyelash density  - normal eyebrow density  - no nail pitting or dystrophy   - no scalp folliculitis/pustules   - In comparison to prior photographs, 2/2025; significant improvement  - No other lesions of concern on  areas examined.     Medications:  Current Outpatient Medications   Medication Sig Dispense Refill     ALBUTEROL IN Inhale into the lungs daily as needed       azelastine (ASTELIN) 0.1 % nasal spray USE 1 TO 2 SPRAYS IN EACH NOSTRIL 1 TO 2 TIMES DAILY AS NEEDED       Calcium Carbonate-Vitamin D (CALCIUM + D PO) Take by mouth daily       cetirizine (ZYRTEC) 10 MG tablet Take 10 mg by mouth daily       Cholecalciferol (VITAMIN D) 1000 UNITS capsule Take 5,000 Units by mouth daily       clobetasol (TEMOVATE) 0.05 % external ointment Use twice daily as needed for psoriasis 60 g 5     clobetasol propionate (TEMOVATE) 0.05 % external cream Apply topically 2 times daily To scalp as needed (Patient taking differently: Apply topically 2 times daily To scalp as needed) 60 g 3     EPINEPHrine (ANY BX GENERIC EQUIV) 0.3 MG/0.3ML injection 2-pack Inject into the lateral thigh if needed for life threatening allergic reactions.       escitalopram (LEXAPRO) 10 MG tablet Take 15 mg by mouth       famotidine (PEPCID) 10 MG tablet TK 1 T PO QD  1     minoxidil (LONITEN) 2.5 MG tablet Take 1/2 tab daily. 45 tablet 3     montelukast (SINGULAIR) 10 MG tablet Take 10 mg by mouth At Bedtime       omalizumab (XOLAIR) 150 MG injection Inject 300 mg Subcutaneous       rosuvastatin (CRESTOR) 10 MG tablet Take 10 mg by mouth daily.       clobetasol (TEMOVATE) 0.05 % external ointment Apply topically 2 times daily To scalp  as needed (Patient not taking: Reported on 6/3/2025) 60 g 3     clobetasol propionate (CLOBEX) 0.05 % external shampoo APPLY TOPICALLY TO A DRY SCALP, LEAVE ON FOR 10-15 MINUTES, THEN LATHER AND RINSE. DO THIS EVERY OTHER  mL 11     ferrous gluconate (FERGON) 324 (38 FE) MG tablet One tablet daily (Patient not taking: Reported on 6/3/2025) 60 tablet 1     Fluocinolone Acetonide Scalp 0.01 % OIL oil Apply topically once a week Use once a week. Apply 1-2 capfuls to dry scalp, massage in and leave on overnight, wash out in  the morning (Patient not taking: Reported on 6/3/2025) 118.28 mL 5     fluocinonide (LIDEX) 0.05 % external solution Apply topically 2 times daily as needed (scalp itch) (Patient not taking: Reported on 6/3/2025) 60 mL 5     ketoconazole (NIZORAL) 2 % shampoo Apply to scalp, lather, then rinse, do every other day alternating with Head & Shoulders (Patient not taking: Reported on 6/3/2025) 120 mL 5     levothyroxine (SYNTHROID, LEVOTHROID) 112 MCG tablet Take by mouth daily       tacrolimus (PROTOPIC) 0.1 % ointment Apply topically 2 times daily (Patient not taking: Reported on 6/3/2025) 60 g 1     tretinoin (RETIN-A) 0.025 % external cream Apply a pea size amount to face daily as tolerated - ok for night time application (Patient not taking: Reported on 6/3/2025) 45 g 1     triamcinolone acetonide (KENALOG) 10 MG/ML injection See med note 5 mL 0     VITAMIN D PO        Current Facility-Administered Medications   Medication Dose Route Frequency Provider Last Rate Last Admin     betamethasone acet & sod phos (CELESTONE) injection 6 mg  6 mg Intramuscular Once Torres Sanchez MD         NONFORMULARY   Does not apply BID Nita Brown MD         triamcinolone acetonide (KENALOG-10) injection 10 mg  10 mg Intra-Lesional Once Nita Brown MD         triamcinolone acetonide (KENALOG-10) injection 10 mg  10 mg Intra-Lesional Once Nita Brown MD         triamcinolone acetonide (KENALOG-10) injection 10 mg  10 mg Other Once Torres Sanchez MD         triamcinolone acetonide (KENALOG-10) injection 10 mg  10 mg Intra-Lesional Once Nita Brown MD         triamcinolone acetonide (KENALOG-10) injection 20 mg  20 mg Intra-Lesional Once Dilia Marin MD         triamcinolone acetonide (KENALOG-10) injection 20 mg  20 mg Intra-Lesional Once Dilia Marin MD         triamcinolone acetonide (KENALOG-10) injection 20 mg  20 mg Intra-Lesional Once Stephanie,  Nita Rivera MD         triamcinolone acetonide (KENALOG-10) injection 20 mg  20 mg Intra-Lesional Once Nita Brown MD         triamcinolone acetonide (KENALOG-10) injection 24 mg  24 mg Intra-Lesional Once Nita Brown MD         triamcinolone acetonide (KENALOG-10) injection 30 mg  30 mg Intra-Lesional Once          triamcinolone acetonide (KENALOG-10) injection 30 mg  3 mL Intra-Lesional Once Dilia Marin MD         triamcinolone acetonide (KENALOG-10) injection 30 mg  3 mL Intra-Lesional Once Dilia Marin MD         triamcinolone acetonide (KENALOG-10) injection 30 mg  30 mg Intra-Lesional Once Dilia Marin MD         triamcinolone acetonide (KENALOG-10) injection 30 mg  30 mg Intra-Lesional Once Nita Brown MD          Past Medical History:   Patient Active Problem List   Diagnosis     Alopecia areata     Dermatitis     Hypertrichosis     Urticaria     Autoimmune disease     Dermatitis, seborrheic     Past Medical History:   Diagnosis Date     Asthma      Hypothyroidism      Lobular breast cancer (H)     s/p chemotherapy and radiation, in remission     Multiple allergies        CC Referred Self, MD  No address on file on close of this encounter.      Again, thank you for allowing me to participate in the care of your patient.        Sincerely,        Nita Brown MD    Electronically signed

## 2025-06-03 NOTE — PROGRESS NOTES
[facility] Dermatology Note  Encounter Date: Clive 3, 2025  Office Visit     Dermatology Problem List:  1.  Alopecia areata  - Current tx:  ILK, Clobex shampoo (1-2x a week), oral minoxidil 1.25 mg daily  - Prior: clindamycin lotn PRN, , H&S shampoo, fluocinolone oil (less since June 2x a month), fluocinonide solution, Rogaine foam (5x a week), Allegra 180 mg morning, cetirizine at night  - s/p ILK most recently on 4/12/21, 1/12/21, 10/22/2021, 1/31/22, 6/13/2022, 12/5/2022, 4/11/23, 5/10/2023,  and then every approximate 6 weeks with Dr. Marin, 1/14/25; 2/25/25  - started PO minoxidil 6/4/24  - HairMetrix 9/15/20, 4/12/21, 1/31/22, 6/13/22, 12/20/23  2. Psoriasis, managed by [facility] dermatologist, Dr. Joshi  - Clobetasol ointment   3. Joint pain/pos JESSICA, anti-centromere  - follows with outside rheum   ____________________________________________    Assessment & Plan:     #  Alopecia areata- chronic, active, seems to be improving  some on po minoxidil  - Continue PO minoxidil 1.25 mg daily  - restart clobetasol shampoo once a week alternating with head & shoulders shampoo  - hold  ILK injections today  - discontinued clobetasol cream/ointment; lidex solution; rogaine      Procedures Performed:   None      Follow-up: 10 month(s) in-person, or earlier for new or changing lesions    Staff and Scribe:     Scribe Disclosure:   By signing my name below, I, Lela Burden, attest that this document has been prepared under the direction and in the presence of Nita Brown MD      Electronically signed by: Lela Burden 6/3/25    Provider Disclosure:   The documentation recorded by the scribe accurately reflects the services I personally performed and the decisions made by me.    Nita Brown MD  Professor   Department of Dermatology  [facility]: Phone: [phone], Fax:[phone]  [facility]: Phone: [phone], Fax: [phone]       ____________________________________________    CC: Hair Loss (Alopecia areata - chronic is improving and noting hair growth. )    HPI:  Ms. Maria C Goodman is a 70 year old female who presents as a return patient for follow-up of hair loss, diagnosed as Alopecia areata .   - Last seen in-clinic on 2/25/25 with Concepcion Marin MD  - Shedding or thinning, or both: none  - Current tx: oral minoxidil 1.25 mg daily; head and shoulders 2-3 times a week alternating with other otc shampoo   no Any new medications, supplements, or products? (please list below)     no Scalp pain   no Scalp burning   no Scalp itching    no Eyebrow changes    no Eyelash changes   Around the lips Bui changes    no Other body hair changes    no Nail changes    no Additional symptoms? (please list below)     - Overall course: Patient has noticed some hair growth. She has felt that her alopecia is doing great. Has noticed some facial hair around the lips.   Patient is otherwise feeling well, in usual state of health, and has no additional skin concerns today.     Labs:  None reviewed.    Physical Exam:  Vitals: /75   Pulse 69   SpO2 95%   GEN: Well developed, well-nourished, in no acute distress, in a pleasant mood.    SKIN: Focused examination of face and scalp was performed.  - no diffuse erythema   - mild perifollicular erythema  - mild perifollicular scale   - no scaling of the scalp   - negative hair pull test   - normal eyelash density  - normal eyebrow density  - no nail pitting or dystrophy   - no scalp folliculitis/pustules   - In comparison to prior photographs, 2/2025; significant improvement  - No other lesions of concern on areas examined.     Medications:  Current Outpatient Medications   Medication Sig Dispense Refill    ALBUTEROL IN Inhale into the lungs daily as needed      azelastine (ASTELIN) 0.1 % nasal spray USE 1 TO 2 SPRAYS IN EACH NOSTRIL 1 TO 2 TIMES  DAILY AS NEEDED      Calcium Carbonate-Vitamin D (CALCIUM + D PO) Take by mouth daily      cetirizine (ZYRTEC) 10 MG tablet Take 10 mg by mouth daily      Cholecalciferol (VITAMIN D) 1000 UNITS capsule Take 5,000 Units by mouth daily      clobetasol (TEMOVATE) 0.05 % external ointment Use twice daily as needed for psoriasis 60 g 5    clobetasol propionate (TEMOVATE) 0.05 % external cream Apply topically 2 times daily To scalp as needed (Patient taking differently: Apply topically 2 times daily To scalp as needed) 60 g 3    EPINEPHrine (ANY BX GENERIC EQUIV) 0.3 MG/0.3ML injection 2-pack Inject into the lateral thigh if needed for life threatening allergic reactions.      escitalopram (LEXAPRO) 10 MG tablet Take 15 mg by mouth      famotidine (PEPCID) 10 MG tablet TK 1 T PO QD  1    minoxidil (LONITEN) 2.5 MG tablet Take 1/2 tab daily. 45 tablet 3    montelukast (SINGULAIR) 10 MG tablet Take 10 mg by mouth At Bedtime      omalizumab (XOLAIR) 150 MG injection Inject 300 mg Subcutaneous      rosuvastatin (CRESTOR) 10 MG tablet Take 10 mg by mouth daily.      clobetasol (TEMOVATE) 0.05 % external ointment Apply topically 2 times daily To scalp  as needed (Patient not taking: Reported on 6/3/2025) 60 g 3    clobetasol propionate (CLOBEX) 0.05 % external shampoo APPLY TOPICALLY TO A DRY SCALP, LEAVE ON FOR 10-15 MINUTES, THEN LATHER AND RINSE. DO THIS EVERY OTHER  mL 11    ferrous gluconate (FERGON) 324 (38 FE) MG tablet One tablet daily (Patient not taking: Reported on 6/3/2025) 60 tablet 1    Fluocinolone Acetonide Scalp 0.01 % OIL oil Apply topically once a week Use once a week. Apply 1-2 capfuls to dry scalp, massage in and leave on overnight, wash out in the morning (Patient not taking: Reported on 6/3/2025) 118.28 mL 5    fluocinonide (LIDEX) 0.05 % external solution Apply topically 2 times daily as needed (scalp itch) (Patient not taking: Reported on 6/3/2025) 60 mL 5    ketoconazole (NIZORAL) 2 % shampoo  Apply to scalp, lather, then rinse, do every other day alternating with Head & Shoulders (Patient not taking: Reported on 6/3/2025) 120 mL 5    levothyroxine (SYNTHROID, LEVOTHROID) 112 MCG tablet Take by mouth daily      tacrolimus (PROTOPIC) 0.1 % ointment Apply topically 2 times daily (Patient not taking: Reported on 6/3/2025) 60 g 1    tretinoin (RETIN-A) 0.025 % external cream Apply a pea size amount to face daily as tolerated - ok for night time application (Patient not taking: Reported on 6/3/2025) 45 g 1    triamcinolone acetonide (KENALOG) 10 MG/ML injection See med note 5 mL 0    VITAMIN D PO        Current Facility-Administered Medications   Medication Dose Route Frequency Provider Last Rate Last Admin    betamethasone acet & sod phos (CELESTONE) injection 6 mg  6 mg Intramuscular Once Torres Sanchez MD        NONFORMULARY   Does not apply BID Nita Brown MD        triamcinolone acetonide (KENALOG-10) injection 10 mg  10 mg Intra-Lesional Once Nita Brown MD        triamcinolone acetonide (KENALOG-10) injection 10 mg  10 mg Intra-Lesional Once Nita Brown MD        triamcinolone acetonide (KENALOG-10) injection 10 mg  10 mg Other Once Torres Sanchez MD        triamcinolone acetonide (KENALOG-10) injection 10 mg  10 mg Intra-Lesional Once Nita Brown MD        triamcinolone acetonide (KENALOG-10) injection 20 mg  20 mg Intra-Lesional Once Dilia Marin MD        triamcinolone acetonide (KENALOG-10) injection 20 mg  20 mg Intra-Lesional Once Dilia Marin MD        triamcinolone acetonide (KENALOG-10) injection 20 mg  20 mg Intra-Lesional Once Nita Brown MD        triamcinolone acetonide (KENALOG-10) injection 20 mg  20 mg Intra-Lesional Once Nita Brown MD        triamcinolone acetonide (KENALOG-10) injection 24 mg  24 mg Intra-Lesional Once Nita Brownczuk, MD         triamcinolone acetonide (KENALOG-10) injection 30 mg  30 mg Intra-Lesional Once         triamcinolone acetonide (KENALOG-10) injection 30 mg  3 mL Intra-Lesional Once Dilia Marin MD        triamcinolone acetonide (KENALOG-10) injection 30 mg  3 mL Intra-Lesional Once Dilia Marin MD        triamcinolone acetonide (KENALOG-10) injection 30 mg  30 mg Intra-Lesional Once Dilia Marin MD        triamcinolone acetonide (KENALOG-10) injection 30 mg  30 mg Intra-Lesional Once Nita Brown MD          Past Medical History:   Patient Active Problem List   Diagnosis    Alopecia areata    Dermatitis    Hypertrichosis    Urticaria    Autoimmune disease    Dermatitis, seborrheic     Past Medical History:   Diagnosis Date    Asthma     Hypothyroidism     Lobular breast cancer (H)     s/p chemotherapy and radiation, in remission    Multiple allergies        CC Referred Self, MD  No address on file on close of this encounter.

## 2025-06-03 NOTE — NURSING NOTE
Maria C Goodman's chief complaint for this visit includes:  Chief Complaint   Patient presents with    Hair Loss     Alopecia areata - chronic is improving and noting hair growth.      PCP: Dayan Quintero    Referring Provider:  Referred Self, MD  No address on file    /75   Pulse 69   SpO2 95%   No Pain (0)        Allergies   Allergen Reactions    Acetaminophen Hives    Other (Do Not Use) Hives     Tegaderm      Prineo tape- skin irritation:redness and itching    Silver Hives     tegaderm adhesive dressing     tegaderm adhesive dressing    Sulfasalazine Hives    Penicillins Hives    Shellfish Allergy GI Disturbance    Silver Nitrate Hives     tegaderm adhesive dressing     Sulfa Antibiotics Hives    Vicodin [Hydrocodone-Acetaminophen] Hives         Do you need any medication refills at today's visit? No    Re Maddox, CMA

## 2025-08-05 ENCOUNTER — OFFICE VISIT (OUTPATIENT)
Dept: DERMATOLOGY | Facility: CLINIC | Age: 70
End: 2025-08-05
Payer: COMMERCIAL

## 2025-08-05 DIAGNOSIS — L63.9 ALOPECIA AREATA: Primary | ICD-10-CM

## 2025-08-05 RX ORDER — TRIAMCINOLONE ACETONIDE 10 MG/ML
15 INJECTION, SUSPENSION INTRA-ARTICULAR; INTRALESIONAL ONCE
Status: COMPLETED | OUTPATIENT
Start: 2025-08-05 | End: 2025-08-05

## 2025-08-05 RX ADMIN — TRIAMCINOLONE ACETONIDE 15 MG: 10 INJECTION, SUSPENSION INTRA-ARTICULAR; INTRALESIONAL at 11:22

## (undated) RX ORDER — LIDOCAINE HYDROCHLORIDE AND EPINEPHRINE 10; 10 MG/ML; UG/ML
INJECTION, SOLUTION INFILTRATION; PERINEURAL
Status: DISPENSED
Start: 2024-03-06